# Patient Record
Sex: FEMALE | Race: WHITE | NOT HISPANIC OR LATINO | Employment: UNEMPLOYED | URBAN - METROPOLITAN AREA
[De-identification: names, ages, dates, MRNs, and addresses within clinical notes are randomized per-mention and may not be internally consistent; named-entity substitution may affect disease eponyms.]

---

## 2017-03-02 ENCOUNTER — TELEPHONE (OUTPATIENT)
Dept: OBGYN | Facility: CLINIC | Age: 27
End: 2017-03-02

## 2017-05-19 ENCOUNTER — TELEPHONE (OUTPATIENT)
Dept: OBGYN | Facility: CLINIC | Age: 27
End: 2017-05-19

## 2017-05-19 DIAGNOSIS — M54.9 BACK PAIN: Primary | ICD-10-CM
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
## 2017-07-28 ENCOUNTER — TELEPHONE (OUTPATIENT)
Dept: OBGYN | Facility: CLINIC | Age: 27
End: 2017-07-28

## 2017-07-28 DIAGNOSIS — R30.0 DYSURIA: Primary | ICD-10-CM

## 2017-07-28 RX ORDER — NITROFURANTOIN 25; 75 MG/1; MG/1
100 CAPSULE ORAL 2 TIMES DAILY
Qty: 14 CAPSULE | Refills: 0 | Status: SHIPPED | OUTPATIENT
Start: 2017-07-28 | End: 2017-09-20

## 2017-07-28 NOTE — TELEPHONE ENCOUNTER
Spoke with Cristine who is on vacation in [place] and experiencing symptoms of UTI. She states that she has burning with urination and has noticed blood in her urine. Denies any fever or back pain. Has not noticed any odor in urine. Has had UTI in past with similar symptoms but it went away on it's own. Patient has allergies to sulfa meds.    Nurse sent macrobid to vacation destination per protocol. Patient will report to urgent care over the weekend if no improvement.

## 2017-08-13 ENCOUNTER — TRANSFERRED RECORDS (OUTPATIENT)
Dept: HEALTH INFORMATION MANAGEMENT | Facility: CLINIC | Age: 27
End: 2017-08-13

## 2017-08-14 ENCOUNTER — TRANSFERRED RECORDS (OUTPATIENT)
Dept: HEALTH INFORMATION MANAGEMENT | Facility: CLINIC | Age: 27
End: 2017-08-14

## 2017-08-15 ENCOUNTER — TRANSFERRED RECORDS (OUTPATIENT)
Dept: HEALTH INFORMATION MANAGEMENT | Facility: CLINIC | Age: 27
End: 2017-08-15

## 2017-09-11 ENCOUNTER — TRANSFERRED RECORDS (OUTPATIENT)
Dept: HEALTH INFORMATION MANAGEMENT | Facility: CLINIC | Age: 27
End: 2017-09-11

## 2017-09-15 ENCOUNTER — TELEPHONE (OUTPATIENT)
Dept: OBGYN | Facility: CLINIC | Age: 27
End: 2017-09-15

## 2017-09-15 DIAGNOSIS — Z11.1 SCREENING EXAMINATION FOR PULMONARY TUBERCULOSIS: Primary | ICD-10-CM

## 2017-09-15 NOTE — TELEPHONE ENCOUNTER
Patient requesting tb test. Orders entered. Also having symptoms of small intestine disease? Wanting refill of Rifaximin, will ask Dr. Desai about this next week.     Order input.

## 2017-09-18 ENCOUNTER — ALLIED HEALTH/NURSE VISIT (OUTPATIENT)
Dept: OBGYN | Facility: CLINIC | Age: 27
End: 2017-09-18
Attending: FAMILY MEDICINE
Payer: COMMERCIAL

## 2017-09-18 DIAGNOSIS — Z11.1 SCREENING EXAMINATION FOR PULMONARY TUBERCULOSIS: Primary | ICD-10-CM

## 2017-09-18 PROCEDURE — 96372 THER/PROPH/DIAG INJ SC/IM: CPT | Mod: ZF

## 2017-09-18 PROCEDURE — 40000269 ZZH STATISTIC NO CHARGE FACILITY FEE: Mod: ZF

## 2017-09-18 NOTE — NURSING NOTE
Chief Complaint   Patient presents with     Allied Health Visit     Mantoux placement.       See JOHN Gong 9/18/2017

## 2017-09-18 NOTE — MR AVS SNAPSHOT
After Visit Summary   9/18/2017    Cristine Caballero    MRN: [id number]           Patient Information     Date Of Birth          1990        Visit Information        Provider Department      9/18/2017 3:00 PM Nurse, [facility]        Today's Diagnoses     Screening examination for pulmonary tuberculosis    -  1       Follow-ups after your visit        Your next 10 appointments already scheduled     Sep 18, 2017  3:00 PM CDT   Nurse Visit with [facility] Nurse   [facility] ([facility])    [facility] [address]  [address]  [facility] [id number] [phone]            Sep 20, 2017  4:00 PM CDT   Nurse Visit with [facility] Nurse   [facility] ([facility])    [facility] [address]  [address]  [facility] [address] [phone]              Who to contact     Please call your clinic at [phone] to:    Ask questions about your health    Make or cancel appointments    Discuss your medicines    Learn about your test results    Speak to your doctor   If you have compliments or concerns about an experience at your clinic, or if you wish to file a complaint, please contact [facility] Patient Relations at [phone] or email us at [email or web address]         Additional Information About Your Visit        MyChart Information     [employer] gives you secure access to your electronic health record. If you see a primary care provider, you can also send messages to your care team and make appointments. If you have questions, please call your primary care clinic.  If you do not have a primary care provider, please call [phone] and they will assist you.      [employer] is an electronic gateway that provides easy, online access to your medical records. With [employer], you can request a clinic appointment, read your  test results, renew a prescription or communicate with your care team.     To access your existing account, please contact your [facility] or call [phone] for assistance.        Care EveryWhere ID     This is your Care EveryWhere ID. This could be used by other organizations to access your [place] medical records  [id number]         Blood Pressure from Last 3 Encounters:   09/01/16 99/71   02/12/16 125/86   08/18/15 123/82    Weight from Last 3 Encounters:   09/01/16 89.8 kg (198 lb)   02/02/16 81.8 kg (180 lb 4.8 oz)   08/18/15 81.2 kg (179 lb 0.6 oz)              Today, you had the following     No orders found for display         Today's Medication Changes          These changes are accurate as of: 9/18/17  2:51 PM.  If you have any questions, ask your nurse or doctor.               These medicines have changed or have updated prescriptions.        Dose/Directions    vitamin D 2000 UNITS tablet   This may have changed:    - how much to take  - additional instructions   Used for:  Unspecified vitamin D deficiency        Dose:  1 tablet   Take 1 tablet by mouth daily Take one tablet daily.   Quantity:  90 tablet   Refills:  3                Primary Care Provider Office Phone # Fax #    Natalie Desai [phone]       [address] [place] [address]        Equal Access to Services     TYREL VAUGHN : Hadii bryce Guadarrama, waaxda luqadaha, qaybta kaalmada jamel, hetal sparrow . So [facility] [phone].    ATENCIÓN: Si habla español, tiene a carranza disposición servicios gratuitos de asistencia lingüística. Llame al [phone].    We comply with applicable federal civil rights laws and [place] laws. We do not discriminate on the basis of race, color, national origin, age, disability sex, sexual orientation or gender identity.            Thank you!     Thank you for choosing [facility]  for  your care. Our goal is always to provide you with excellent care. Hearing back from our patients is one way we can continue to improve our services. Please take a few minutes to complete the written survey that you may receive in the mail after your visit with us. Thank you!             Your Updated Medication List - Protect others around you: Learn how to safely use, store and throw away your medicines at www.disposemymeds.org.          This list is accurate as of: 9/18/17  2:51 PM.  Always use your most recent med list.                   Brand Name Dispense Instructions for use Diagnosis    ABILIFY 5 MG tablet   Generic drug:  ARIPiprazole      Take 5 mg by mouth daily        EFFEXOR PO      Take 150 mg by mouth daily        IBUPROFEN PO      Take 600 mg by mouth every 6 hours as needed for moderate pain        * INDERAL PO      Take 40 mg by mouth 3 times daily        * INDERAL LA PO      Take 60 mg by mouth daily        MELATONIN PO      Take 3 mg by mouth 2 times daily        nitroFURantoin (macrocrystal-monohydrate) 100 MG capsule    MACROBID    14 capsule    Take 1 capsule (100 mg) by mouth 2 times daily    Dysuria       OMEGA-3 FISH OIL PO      Take 2 g by mouth 2 times daily        TRAZODONE HCL PO      Take 25 mg by mouth daily        vitamin B complex with vitamin C Tabs tablet      Take 1 tablet by mouth daily        vitamin D 2000 UNITS tablet     90 tablet    Take 1 tablet by mouth daily Take one tablet daily.    Unspecified vitamin D deficiency       * Notice:  This list has 2 medication(s) that are the same as other medications prescribed for you. Read the directions carefully, and ask your doctor or other care provider to review them with you.

## 2017-09-20 ENCOUNTER — TELEPHONE (OUTPATIENT)
Dept: OBGYN | Facility: CLINIC | Age: 27
End: 2017-09-20

## 2017-09-20 ENCOUNTER — ALLIED HEALTH/NURSE VISIT (OUTPATIENT)
Dept: OBGYN | Facility: CLINIC | Age: 27
End: 2017-09-20
Attending: FAMILY MEDICINE
Payer: COMMERCIAL

## 2017-09-20 ENCOUNTER — OFFICE VISIT (OUTPATIENT)
Dept: URGENT CARE | Facility: URGENT CARE | Age: 27
End: 2017-09-20
Payer: COMMERCIAL

## 2017-09-20 VITALS
OXYGEN SATURATION: 98 % | SYSTOLIC BLOOD PRESSURE: 114 MMHG | HEIGHT: 65 IN | DIASTOLIC BLOOD PRESSURE: 74 MMHG | HEART RATE: 65 BPM | BODY MASS INDEX: 34.16 KG/M2 | TEMPERATURE: 98.5 F | WEIGHT: 205 LBS

## 2017-09-20 DIAGNOSIS — Z11.1 SCREENING EXAMINATION FOR PULMONARY TUBERCULOSIS: Primary | ICD-10-CM

## 2017-09-20 DIAGNOSIS — H65.92 LEFT OTITIS MEDIA WITH EFFUSION: Primary | ICD-10-CM

## 2017-09-20 DIAGNOSIS — K63.89 INTESTINAL BACTERIAL OVERGROWTH: ICD-10-CM

## 2017-09-20 LAB
PPDINDURATION: 0 MM (ref 0–5)
PPDREDNESS: 0 MM

## 2017-09-20 PROCEDURE — 40000809 ZZH STATISTIC NO DOCUMENTATION TO SUPPORT CHARGE

## 2017-09-20 PROCEDURE — 99212 OFFICE O/P EST SF 10 MIN: CPT | Mod: ZF

## 2017-09-20 PROCEDURE — 99213 OFFICE O/P EST LOW 20 MIN: CPT | Performed by: FAMILY MEDICINE

## 2017-09-20 RX ORDER — AZITHROMYCIN 250 MG/1
TABLET, FILM COATED ORAL
Qty: 6 TABLET | Refills: 0 | Status: SHIPPED | OUTPATIENT
Start: 2017-09-20 | End: 2017-11-20

## 2017-09-20 NOTE — LETTER
[facility]  [facility] [address]  [address]  [address]  [facility] [address]  [phone]  [phone]              Cristine Caballero  [address]  [facility] [address]  : 1990  MRN:  [id number]      2017          To Whom It May Concern:    Cristine presented to clinic on 17 at 2:34 p.m. and received the mantoux skin test.    The test was read on 17 at 3:26 p.m. and was negative for redness and had 0 induration.    Sincerely,        Marleny Harp RN

## 2017-09-20 NOTE — PROGRESS NOTES
Subjective: About 5 days ago patient noticed that the left ear was plugged and she even saw a little bit of blood come out of it, during his off, doesn't have a cold. She hasn't had trouble with her ears before. She also noticed a boil below the right ear but she's been having a lot of acne problems lately.    Objective: There is yellow pus behind the left eardrum but it's not particularly distorted or red. There is a dime-sized boil behind the right ear.    Assessment and plan: She certainly has an effusion in the left ear consistent with an ear infection but it's not a typical story. I think it's worth treating with antibiotics and that should help cyst on the right get better. I don't think it needs to be opened up and drained at this small size. She also had a complication of a bowel bacterial overgrowth and is going to be starting antibiotics to treat that sometime in the near future. I don't think that will have any effect on taking a course of Zithromax. The ear could take a month to get back to normal but if it doesn't she should go back and see her regular doctor.

## 2017-09-20 NOTE — NURSING NOTE
"Chief Complaint   Patient presents with     Urgent Care     Ear Problem     Left ear started to feel plugged 4 - 5 days ago; some discomfort and did have a bit of bloody drainage from ear.     Mass     4 - 5 days ago started to notice tender mass posterior to right ear, it's the same size as it was when she first noticed it.     Initial /74  Pulse 65  Temp 98.5  F (36.9  C) (Oral)  Ht 5' 5\" (1.651 m)  Wt 205 lb (93 kg)  LMP 09/03/2017 (Approximate)  SpO2 98%  BMI 34.11 kg/m2 Estimated body mass index is 34.11 kg/(m^2) as calculated from the following:    Height as of this encounter: 5' 5\" (1.651 m).    Weight as of this encounter: 205 lb (93 kg)..  BP completed using cuff size: large  SARA Purdy  "

## 2017-09-20 NOTE — MR AVS SNAPSHOT
"              After Visit Summary   9/20/2017    Cristine Caballero    MRN: [id number]           Patient Information     Date Of Birth          1990        Visit Information        Provider Department      9/20/2017 5:45 PM Jacob Millan MD [facility]        Today's Diagnoses     Left otitis media with effusion    -  1       Follow-ups after your visit        Who to contact     If you have questions or need follow up information about today's clinic visit or your schedule please contact [facility] directly at [phone].  Normal or non-critical lab and imaging results will be communicated to you by [employer], letter or phone within 4 business days after the clinic has received the results. If you do not hear from us within 7 days, please contact the clinic through [employer] or phone. If you have a critical or abnormal lab result, we will notify you by phone as soon as possible.  Submit refill requests through [employer] or call your pharmacy and they will forward the refill request to us. Please allow 3 business days for your refill to be completed.          Additional Information About Your Visit        MyChart Information     [employer] gives you secure access to your electronic health record. If you see a primary care provider, you can also send messages to your care team and make appointments. If you have questions, please call your primary care clinic.  If you do not have a primary care provider, please call [phone] and they will assist you.        Care EveryWhere ID     This is your Care EveryWhere ID. This could be used by other organizations to access your [place] medical records  [id number]        Your Vitals Were     Pulse Temperature Height Last Period Pulse Oximetry BMI (Body Mass Index)    65 98.5  F (36.9  C) (Oral) 5' 5\" (1.651 m) 09/03/2017 (Approximate) 98% 34.11 kg/m2       Blood Pressure from Last 3 Encounters:   09/20/17 114/74   09/01/16 " 99/71   02/12/16 125/86    Weight from Last 3 Encounters:   09/20/17 205 lb (93 kg)   09/01/16 198 lb (89.8 kg)   02/02/16 180 lb 4.8 oz (81.8 kg)              Today, you had the following     No orders found for display         Today's Medication Changes          These changes are accurate as of: 9/20/17  6:12 PM.  If you have any questions, ask your nurse or doctor.               Start taking these medicines.        Dose/Directions    azithromycin 250 MG tablet   Commonly known as:  ZITHROMAX   Used for:  Left otitis media with effusion   Started by:  Jacob Millan MD        Two tablets first day, then one tablet daily for four days.   Quantity:  6 tablet   Refills:  0       rifaximin 550 MG Tabs tablet   Commonly known as:  XIFAXAN   Used for:  Intestinal bacterial overgrowth   Started by:  Nurse, Ump Whs        Dose:  550 mg   Take 1 tablet (550 mg) by mouth 2 times daily   Quantity:  20 tablet   Refills:  2         These medicines have changed or have updated prescriptions.        Dose/Directions    vitamin D 2000 UNITS tablet   This may have changed:    - how much to take  - additional instructions   Used for:  Unspecified vitamin D deficiency        Dose:  1 tablet   Take 1 tablet by mouth daily Take one tablet daily.   Quantity:  90 tablet   Refills:  3            Where to get your medicines      These medications were sent to [facility] [address] [place] [address] AT [address] [address]     Phone:  [phone]     azithromycin 250 MG tablet    rifaximin 550 MG Tabs tablet                Primary Care Provider Office Phone # Fax #    Natalie Desai -[phone]       [address] [address]        Equal Access to Services     TYREL [facility] HospitalBETTIE : Madina Guadarrama, celestino rodriguez, hetal ogden. So [facility] [phone].    ATENCIÓN: Jessie hernandez  español, tiene a carranza disposición servicios gratuitos de asistencia lingüística. Vivian willson [phone].    We comply with applicable federal civil rights laws and [place] laws. We do not discriminate on the basis of race, color, national origin, age, disability sex, sexual orientation or gender identity.            Thank you!     Thank you for choosing [facility]  for your care. Our goal is always to provide you with excellent care. Hearing back from our patients is one way we can continue to improve our services. Please take a few minutes to complete the written survey that you may receive in the mail after your visit with us. Thank you!             Your Updated Medication List - Protect others around you: Learn how to safely use, store and throw away your medicines at www.disposemymeds.org.          This list is accurate as of: 9/20/17  6:12 PM.  Always use your most recent med list.                   Brand Name Dispense Instructions for use Diagnosis    ABILIFY 5 MG tablet   Generic drug:  ARIPiprazole      Take 5 mg by mouth daily        azithromycin 250 MG tablet    ZITHROMAX    6 tablet    Two tablets first day, then one tablet daily for four days.    Left otitis media with effusion       BUSPIRONE HCL PO      Take 10 mg by mouth 3 times daily        EFFEXOR PO      Take 150 mg by mouth daily        IBUPROFEN PO      Take 600 mg by mouth every 6 hours as needed for moderate pain        * INDERAL PO      Take 40 mg by mouth 3 times daily        * INDERAL LA PO      Take 60 mg by mouth daily        MELATONIN PO      Take 3 mg by mouth 2 times daily        OMEGA-3 FISH OIL PO      Take 2 g by mouth 2 times daily        rifaximin 550 MG Tabs tablet    XIFAXAN    20 tablet    Take 1 tablet (550 mg) by mouth 2 times daily    Intestinal bacterial overgrowth       TRAZODONE HCL PO      Take 25 mg by mouth daily        vitamin B complex with vitamin C Tabs tablet      Take 1 tablet by mouth  daily        vitamin D 2000 UNITS tablet     90 tablet    Take 1 tablet by mouth daily Take one tablet daily.    Unspecified vitamin D deficiency       * Notice:  This list has 2 medication(s) that are the same as other medications prescribed for you. Read the directions carefully, and ask your doctor or other care provider to review them with you.

## 2017-10-03 ENCOUNTER — MEDICAL CORRESPONDENCE (OUTPATIENT)
Dept: HEALTH INFORMATION MANAGEMENT | Facility: CLINIC | Age: 27
End: 2017-10-03

## 2017-10-03 ENCOUNTER — TELEPHONE (OUTPATIENT)
Dept: FAMILY MEDICINE | Facility: CLINIC | Age: 27
End: 2017-10-03

## 2017-10-03 PROBLEM — K63.8219 SMALL INTESTINAL BACTERIAL OVERGROWTH: Status: ACTIVE | Noted: 2017-10-03

## 2017-10-03 NOTE — TELEPHONE ENCOUNTER
Prior Authorization Retail Medication Request  Medication/Dose:  Xifaxan 550 MG by mouth 2 times daily  Diagnosis and ICD code: Intestinal bacterial overgrowth K63.89  New/Renewal/Insurance Change [place]: renewal  Previously Tried and Failed Therapies:      Insurance ID (if provided):    Insurance Phone (if provided):      Any additional info from fax request:     If you received a fax notification from an outside Pharmacy:  Pharmacy Name:[facility]  Pharmacy #:[phone]  Pharmacy Fax:[phone]

## 2017-11-08 ENCOUNTER — TRANSFERRED RECORDS (OUTPATIENT)
Dept: HEALTH INFORMATION MANAGEMENT | Facility: CLINIC | Age: 27
End: 2017-11-08

## 2017-11-10 ENCOUNTER — TRANSFERRED RECORDS (OUTPATIENT)
Dept: HEALTH INFORMATION MANAGEMENT | Facility: CLINIC | Age: 27
End: 2017-11-10

## 2017-11-20 ENCOUNTER — OFFICE VISIT (OUTPATIENT)
Dept: FAMILY MEDICINE | Facility: CLINIC | Age: 27
End: 2017-11-20
Attending: FAMILY MEDICINE
Payer: COMMERCIAL

## 2017-11-20 VITALS
TEMPERATURE: 98.1 F | BODY MASS INDEX: 36.99 KG/M2 | WEIGHT: 222 LBS | HEIGHT: 65 IN | DIASTOLIC BLOOD PRESSURE: 77 MMHG | SYSTOLIC BLOOD PRESSURE: 113 MMHG | HEART RATE: 69 BPM

## 2017-11-20 DIAGNOSIS — H90.2 CONDUCTIVE HEARING LOSS, UNILATERAL: Primary | ICD-10-CM

## 2017-11-20 PROCEDURE — 99212 OFFICE O/P EST SF 10 MIN: CPT | Mod: ZF

## 2017-11-20 RX ORDER — LURASIDONE HYDROCHLORIDE 20 MG/1
TABLET, FILM COATED ORAL DAILY
COMMUNITY
End: 2018-04-17

## 2017-11-20 RX ORDER — PROPRANOLOL HYDROCHLORIDE 10 MG/1
TABLET ORAL 3 TIMES DAILY
COMMUNITY
End: 2018-04-17

## 2017-11-20 RX ORDER — ARIPIPRAZOLE 15 MG/1
15 TABLET ORAL DAILY
COMMUNITY
End: 2018-04-17

## 2017-11-20 ASSESSMENT — PAIN SCALES - GENERAL: PAINLEVEL: NO PAIN (0)

## 2017-11-20 NOTE — PROGRESS NOTES
Primary Care Center  Established Patient visit    Name: Cristine Caballero MRN: [id number]     Age: 27 year old           Chief Complaint:    YOB: 1990       CC:hearing loss         HPI and ROS:   HPI:    27 year old female with a PMH of MDD, OCD, DENAE  And IBS who comes in because of sudden hearing loss on the left side.  This started about 2 wks ago  - she went to urgent care and was given nasal steroid spray  And treated for serous otitis.  It has not cleared. She can't pop the left ear, she feels like she is hearing in a tunnel, no dizziness, she has been using nasal steroid spray about 1 wk. She denies dizziness, has some pain but not intense pain, no face burning or pain, no jaw burning or pain, slight sore throat, nasal congestion - a cold is just starting.        ROS:  She reports weight gain, increase stress, nasal congestion, cough, hearing loss, stuffiness , SOB, anxiety, depression and difficulty concentrating  Rest of ROS is negative other than mentioned in HPI         Past Medical History:     Past Medical History:   Diagnosis Date     Anxiety disorder      Environmental allergies      Hx of eating disorder      Mild major depression (H)     ANW     OCD (obsessive compulsive disorder)     stable with CBT      Suicide attempt by multiple drug overdose (H) 4.21.2016     Vitamin D deficiency              Past Surgical History:      Past Surgical History:   Procedure Laterality Date     biopsy  8-    vulvar     BIOPSY      GI, vulva- OK     COLONOSCOPY  2011     DENTAL SURGERY  2011     ENT SURGERY  2014    intubation & NG tube     ESOPHAGOSCOPY, GASTROSCOPY, DUODENOSCOPY (EGD), COMBINED  10/11/2011    Procedure:COMBINED ESOPHAGOSCOPY, GASTROSCOPY, DUODENOSCOPY (EGD), BIOPSY SINGLE OR MULTIPLE; Surgeon:TALA MONTEMAYOR; Location:[facility]     HC BREATH HYDROGEN TEST  10/4/2011    Procedure:HYDROGEN BREATH TEST; Surgeon:RADHA EPSTEIN; Location:[facility]     LAPAROSCOPY        "ORTHOPEDIC SURGERY      left wrist- screw in left scaphiod     TONSILLECTOMY       WRIST SURGERY  2004    screw placed L wrist after fracture               Immunizations:     Immunization History   Administered Date(s) Administered     HEPA 04/27/2012     Influenza (IIV3) 10/03/2011     Mantoux 08/18/2015, 09/18/2017     OPV, trivalent, live 05/22/2012     Tdap (Adacel,Boostrix) 04/27/2012     Typhoid IM 04/27/2012             Allergies:     Allergies   Allergen Reactions     Codeine Other (See Comments)     Other reaction(s): Tremors     Corn Dextrin      Gluten Meal GI Disturbance     Penicillin G      Sulfa Drugs              Medications:     Current Outpatient Prescriptions on File Prior to Visit:  Venlafaxine HCl (EFFEXOR PO) Take 150 mg by mouth daily Take 2 tablets   Propranolol HCl (INDERAL LA PO) Take 60 mg by mouth daily   Omega-3 Fatty Acids (OMEGA-3 FISH OIL PO) Take 2 g by mouth 2 times daily    Cholecalciferol (VITAMIN D) 2000 UNITS tablet Take 1 tablet by mouth daily Take one tablet daily. (Patient taking differently: Take 2,000 Units by mouth daily )     No current facility-administered medications on file prior to visit.          Exam:   Ht 1.651 m (5' 5\")  Wt 100.7 kg (222 lb)  BMI 36.94 kg/m2  Female   General: NAD, alert and conversational  HEENT: PEERL, right ear diminished light reflex, left ear there is a pustule on the superior aspect of the TM, mild surrounding redness, nasal congestion bilaterally, neck supple no discrete nodes, OP mucus membranes moist, no lesions  CV: Normal S1,S2 with RRR no murmurs, rubs or gallops  Resp: Lungs CTA bilaterally with no wheezes or crackles  Neuro: cranial nerves intact   Skin: moderate inflammatory acne on face        Labs:   none         Assessment and Plan:   Assessment: Young woman with sudden hearing loss left ear thought to be a serous otitis, not clearing and has abnormal TM on the left  Plan  1. Hearing loss - with the pustule on the TM would " worry about Lopez Hunt syndrome but does not have intense pain, and no facial features - other possbility is this is early in the syndrome or possibly just a serous otitis media  I talked to ENT triage nurse and they can't get her in this wk or next wk. Told pt if pain gets more intense then go to ED otherwise she will call and schedule within the next 2 wks - continue with nasal spray.     RTC: 1 month   Niyah Liao MD, PhD  I spent  25 minutes with this patient.  > 50% of time spent in counseling and coordination of care

## 2017-11-20 NOTE — LETTER
11/20/2017       RE: Cristine Caballero  [address]  [facility] [address]     Dear Colleague,    Thank you for referring your patient, Cristine Caballero, to the [facility] at [facility]. Please see a copy of my visit note below.              Primary Care Center  Established Patient visit    Name: Cristine Caballero MRN: [id number]     Age: 27 year old           Chief Complaint:    YOB: 1990       CC:hearing loss         HPI and ROS:   HPI:    27 year old female with a PMH of MDD, OCD, DENAE  And IBS who comes in because of sudden hearing loss on the left side.  This started about 2 wks ago  - she went to urgent care and was given nasal steroid spray  And treated for serous otitis.  It has not cleared. She can't pop the left ear, she feels like she is hearing in a tunnel, no dizziness, she has been using nasal steroid spray about 1 wk. She denies dizziness, has some pain but not intense pain, no face burning or pain, no jaw burning or pain, slight sore throat, nasal congestion - a cold is just starting.        ROS:  She reports weight gain, increase stress, nasal congestion, cough, hearing loss, stuffiness , SOB, anxiety, depression and difficulty concentrating  Rest of ROS is negative other than mentioned in HPI         Past Medical History:     Past Medical History:   Diagnosis Date     Anxiety disorder      Environmental allergies      Hx of eating disorder      Mild major depression (H)     ANW     OCD (obsessive compulsive disorder)     stable with CBT      Suicide attempt by multiple drug overdose (H) 4.21.2016     Vitamin D deficiency              Past Surgical History:      Past Surgical History:   Procedure Laterality Date     biopsy  8-    vulvar     BIOPSY      GI, vulva- OK     COLONOSCOPY  2011     DENTAL SURGERY  2011     ENT SURGERY  2014    intubation & NG tube     ESOPHAGOSCOPY, GASTROSCOPY, DUODENOSCOPY (EGD),  "COMBINED  10/11/2011    Procedure:COMBINED ESOPHAGOSCOPY, GASTROSCOPY, DUODENOSCOPY (EGD), BIOPSY SINGLE OR MULTIPLE; Surgeon:TALA MONTEMAYOR; Location:[facility]     HC BREATH HYDROGEN TEST  10/4/2011    Procedure:HYDROGEN BREATH TEST; Surgeon:RADHA EPSTEIN; Location:[facility]     LAPAROSCOPY       ORTHOPEDIC SURGERY      left wrist- screw in left scaphiod     TONSILLECTOMY       WRIST SURGERY  2004    screw placed L wrist after fracture               Immunizations:     Immunization History   Administered Date(s) Administered     HEPA 04/27/2012     Influenza (IIV3) 10/03/2011     Mantoux 08/18/2015, 09/18/2017     OPV, trivalent, live 05/22/2012     Tdap (Adacel,Boostrix) 04/27/2012     Typhoid IM 04/27/2012             Allergies:     Allergies   Allergen Reactions     Codeine Other (See Comments)     Other reaction(s): Tremors     Corn Dextrin      Gluten Meal GI Disturbance     Penicillin G      Sulfa Drugs              Medications:     Current Outpatient Prescriptions on File Prior to Visit:  Venlafaxine HCl (EFFEXOR PO) Take 150 mg by mouth daily Take 2 tablets   Propranolol HCl (INDERAL LA PO) Take 60 mg by mouth daily   Omega-3 Fatty Acids (OMEGA-3 FISH OIL PO) Take 2 g by mouth 2 times daily    Cholecalciferol (VITAMIN D) 2000 UNITS tablet Take 1 tablet by mouth daily Take one tablet daily. (Patient taking differently: Take 2,000 Units by mouth daily )     No current facility-administered medications on file prior to visit.          Exam:   Ht 1.651 m (5' 5\")  Wt 100.7 kg (222 lb)  BMI 36.94 kg/m2  Female   General: NAD, alert and conversational  HEENT: PEERL, right ear diminished light reflex, left ear there is a pustule on the superior aspect of the TM, mild surrounding redness, nasal congestion bilaterally, neck supple no discrete nodes, OP mucus membranes moist, no lesions  CV: Normal S1,S2 with RRR no murmurs, rubs or gallops  Resp: Lungs CTA bilaterally with no wheezes or crackles  Neuro: cranial nerves " intact   Skin: moderate inflammatory acne on face        Labs:   none         Assessment and Plan:   Assessment: Young woman with sudden hearing loss left ear thought to be a serous otitis, not clearing and has abnormal TM on the left  Plan  1. Hearing loss - with the pustule on the TM would worry about Lopez Hunt syndrome but does not have intense pain, and no facial features - other possbility is this is early in the syndrome or possibly just a serous otitis media  I talked to ENT triage nurse and they can't get her in this wk or next wk. Told pt if pain gets more intense then go to ED otherwise she will call and schedule within the next 2 wks - continue with nasal spray.     RTC: 1 month   Niyah Liao MD, PhD  I spent  25 minutes with this patient.  > 50% of time spent in counseling and coordination of care                 Again, thank you for allowing me to participate in the care of your patient.      Sincerely,    Niyah Liao MD PhD

## 2017-11-20 NOTE — PATIENT INSTRUCTIONS
Call  option #1 and tell them I talked to Triage nurse and you should be seen as soon as you can  If pain gets intense or worsens go to the ED  No cutips in the ear  Continue the nasal spray  Keep the appt at [facility] on Dec 11th  Try to pop ears gently

## 2017-11-20 NOTE — MR AVS SNAPSHOT
After Visit Summary   11/20/2017    Cristine Caballero    MRN: [id number]           Patient Information     Date Of Birth          1990        Visit Information        Provider Department      11/20/2017 4:00 PM Niyah Liao MD PhD [facility]        Today's Diagnoses     Conductive hearing loss, unilateral    -  1      Care Instructions    Call  option #1 and tell them I talked to Triage nurse and you should be seen as soon as you can  If pain gets intense or worsens go to the ED  No cutips in the ear  Continue the nasal spray  [facility] the app[place] [facility] [place]  Try to pop ears gently           Follow-ups after your visit        Additional Services     OTOLARYNGOLOGY REFERRAL       Your provider has referred you to: [facility]: Adult Ear, Nose [facility] Throat Clinic (Otolaryngology) [facility] [phone]  [email or web address]      Please be aware that coverage of these services is subject to the terms and limitations of your health insurance plan.  Call member services at your health plan with any benefit or coverage questions.      Please bring the following with you to your appointment:    (1) Any X-Rays, CTs or MRIs which have been performed.  Contact the facility where they were done to arrange for  prior to your scheduled appointment.   (2) List of current medications  (3) This referral request   (4) Any documents/labs given to you for this referral                  Future tests that were ordered for you today     Open Future Orders        Priority Expected Expires Ordered    OTOLARYNGOLOGY REFERRAL Routine 11/21/2017 1/20/2018 11/20/2017            Who to contact     Please call your clinic at [phone] to:    Ask questions about your health    Make or cancel appointments    Discuss your medicines    Learn about your test results    Speak to your doctor   If you have compliments or concerns about an  "experience at your clinic, or if you wish to file a complaint, please contact [facility] Patient Relations at [phone] or email us at [email or web address]         Additional Information About Your Visit        BLUEPHOENIX[employer] Information     [employer] gives you secure access to your electronic health record. If you see a primary care provider, you can also send messages to your care team and make appointments. If you have questions, please call your primary care clinic.  If you do not have a primary care provider, please call [phone] and they will assist you.      [employer] is an electronic gateway that provides easy, online access to your medical records. With [employer], you can request a clinic appointment, read your test results, renew a prescription or communicate with your care team.     To access your existing account, please contact your [facility] or call [phone] for assistance.        Care EveryWhere ID     This is your Care EveryWhere ID. This could be used by other organizations to access your [place] medical records  [id number]        Your Vitals Were     Pulse Temperature Height BMI (Body Mass Index)          69 98.1  F (36.7  C) 1.651 m (5' 5\") 36.94 kg/m2         Blood Pressure from Last 3 Encounters:   11/20/17 113/77   09/20/17 114/74   09/01/16 99/71    Weight from Last 3 Encounters:   11/20/17 100.7 kg (222 lb)   09/20/17 93 kg (205 lb)   09/01/16 89.8 kg (198 lb)                 Today's Medication Changes          These changes are accurate as of: 11/20/17  4:44 PM.  If you have any questions, ask your nurse or doctor.               These medicines have changed or have updated prescriptions.        Dose/Directions    vitamin D 2000 UNITS tablet   This may have changed:    - how much to take  - additional instructions   Used for:  Unspecified vitamin D deficiency        Dose:  1 tablet   Take 1 tablet by mouth daily Take one " tablet daily.   Quantity:  90 tablet   Refills:  3                Primary Care Provider Office Phone # Fax #    Natalie Desai -[phone]       [address] [address]        Equal Access to Services     RAKESH VAUGHN : Hadii bryce cerda mahnaz Sofelizali, waaxda luqadaha, qaybta kaalmada ademichaelyada, hetal bekaitlin duttonmichael boyer laJesus Albertoyon marcos. So [facility] [phone].    ATENCIÓN: Si habla español, tiene a carranza disposición servicios gratuitos de asistencia lingüística. Llame al [phone].    We comply with applicable federal civil rights laws and [place] laws. We do not discriminate on the basis of race, color, national origin, age, disability, sex, sexual orientation, or gender identity.            Thank you!     Thank you for choosing [facility]  for your care. Our goal is always to provide you with excellent care. Hearing back from our patients is one way we can continue to improve our services. Please take a few minutes to complete the written survey that you may receive in the mail after your visit with us. Thank you!             Your Updated Medication List - Protect others around you: Learn how to safely use, store and throw away your medicines at www.disposemymeds.org.          This list is accurate as of: 11/20/17  4:44 PM.  Always use your most recent med list.                   Brand Name Dispense Instructions for use Diagnosis    ABILIFY 15 MG tablet   Generic drug:  ARIPiprazole      Take 15 mg by mouth daily        EFFEXOR PO      Take 150 mg by mouth daily Take 2 tablets        * INDERAL LA PO      Take 60 mg by mouth daily        * propranolol 10 MG tablet    INDERAL     Take by mouth 3 times daily        LATUDA 20 MG Tabs tablet   Generic drug:  lurasidone      Take by mouth daily        OMEGA-3 FISH OIL PO      Take 2 g by mouth 2 times daily        vitamin D 2000 UNITS tablet     90 tablet    Take 1 tablet by mouth daily Take one tablet daily.     Unspecified vitamin D deficiency       * Notice:  This list has 2 medication(s) that are the same as other medications prescribed for you. Read the directions carefully, and ask your doctor or other care provider to review them with you.

## 2017-11-24 ENCOUNTER — TRANSFERRED RECORDS (OUTPATIENT)
Dept: HEALTH INFORMATION MANAGEMENT | Facility: CLINIC | Age: 27
End: 2017-11-24

## 2017-12-22 ENCOUNTER — TELEPHONE (OUTPATIENT)
Dept: OBGYN | Facility: CLINIC | Age: 27
End: 2017-12-22

## 2017-12-22 NOTE — TELEPHONE ENCOUNTER
Received call from Cristine stating she is having nausea, chills and sweating as well as some spotting after recent dx of 'vaginitis' and treated at urgent care with both antibiotic and metrogel because they were not sure what she had. Confirmed pt is NOT pregnant.    Discussed the followin. The nausea, chills and sweating are likely a virus that has been going around. Hydrate and watch for signs of dehydration (discussed with pt what these are). If dehydration develops, go to ED.  2. The bleeding can be from the vaginitis  - can monitor at home. If it increases, develops an odor, or develop pain - be seen in clinic. If not comfortable remaining at home and monitoring - can come to clinic today. She states she would like to continue to monitor at home and had no further questions.

## 2018-03-09 ENCOUNTER — TELEPHONE (OUTPATIENT)
Dept: OBGYN | Facility: CLINIC | Age: 28
End: 2018-03-09

## 2018-03-09 DIAGNOSIS — Z11.1 ENCOUNTER FOR SCREENING FOR RESPIRATORY TUBERCULOSIS: Primary | ICD-10-CM

## 2018-03-09 NOTE — TELEPHONE ENCOUNTER
Patient called to request mantoux placement. Will be completing a nursing assistant course. Ordered mantoux and scheduled nurse appointments for patient.

## 2018-03-13 ENCOUNTER — ALLIED HEALTH/NURSE VISIT (OUTPATIENT)
Dept: OBGYN | Facility: CLINIC | Age: 28
End: 2018-03-13
Payer: COMMERCIAL

## 2018-03-13 DIAGNOSIS — Z11.1 ENCOUNTER FOR SCREENING FOR RESPIRATORY TUBERCULOSIS: Primary | ICD-10-CM

## 2018-03-13 PROCEDURE — 40000269 ZZH STATISTIC NO CHARGE FACILITY FEE: Mod: ZF

## 2018-03-13 PROCEDURE — 96372 THER/PROPH/DIAG INJ SC/IM: CPT | Mod: ZF

## 2018-03-13 NOTE — MR AVS SNAPSHOT
After Visit Summary   3/13/2018    Cristine Caballero    MRN: [id number]           Patient Information     Date Of Birth          1990        Visit Information        Provider Department      3/13/2018 9:30 AM Nurse, [facility]        Today's Diagnoses     Encounter for screening for respiratory tuberculosis    -  1       Follow-ups after your visit        Your next 10 appointments already scheduled     Mar 15, 2018 11:00 AM CDT   Nurse Visit with [facility] Nurse   [facility] ([facility])    [facility]  [address]  [address]  [facility] [id number] [phone]              Who to contact     Please call your clinic at [phone] to:    Ask questions about your health    Make or cancel appointments    Discuss your medicines    Learn about your test results    Speak to your doctor            Additional Information About Your Visit        MyChart Information     [employer] gives you secure access to your electronic health record. If you see a primary care provider, you can also send messages to your care team and make appointments. If you have questions, please call your primary care clinic.  If you do not have a primary care provider, please call [phone] and they will assist you.      [employer] is an electronic gateway that provides easy, online access to your medical records. With [employer], you can request a clinic appointment, read your test results, renew a prescription or communicate with your care team.     To access your existing account, please contact your [facility] or call [phone] for assistance.        Care EveryWhere ID     This is your Care EveryWhere ID. This could be used by other organizations to access your [place] medical records  [id number]         Blood Pressure from Last 3 Encounters:   11/20/17 113/77   09/20/17 114/74   09/01/16 99/71    Weight  from Last 3 Encounters:   11/20/17 100.7 kg (222 lb)   09/20/17 93 kg (205 lb)   09/01/16 89.8 kg (198 lb)              Today, you had the following     No orders found for display         Today's Medication Changes          These changes are accurate as of 3/13/18 11:29 AM.  If you have any questions, ask your nurse or doctor.               Start taking these medicines.        Dose/Directions    tuberculin 5 UNIT/0.1ML injection   Used for:  Encounter for screening for respiratory tuberculosis        Dose:  5 Units   Inject 0.1 mLs (5 Units) into the skin once for 1 dose   Quantity:  0.1 mL   Refills:  0         These medicines have changed or have updated prescriptions.        Dose/Directions    vitamin D 2000 UNITS tablet   This may have changed:    - how much to take  - additional instructions   Used for:  Unspecified vitamin D deficiency        Dose:  1 tablet   Take 1 tablet by mouth daily Take one tablet daily.   Quantity:  90 tablet   Refills:  3            Where to get your medicines      Some of these will need a paper prescription and others can be bought over the counter.  Ask your nurse if you have questions.     You don't need a prescription for these medications     tuberculin 5 UNIT/0.1ML injection                Primary Care Provider Office Phone # Fax #    Natalie Desai [phone]       [address] [place] [address]        Equal Access to Services     RAKESH VAUGHN AH: Hadii bryce alcantaro Solew, waaxda luqadaha, qaybta kaalmada adeegyada, hetal marcos. So [facility] [phone].    ATENCIÓN: Si habla español, tiene a carranza disposición servicios gratuitos de asistencia lingüística. Llame al [phone].    We comply with applicable federal civil rights laws and [place] laws. We do not discriminate on the basis of race, color, national origin, age, disability, sex, sexual orientation, or gender identity.            Thank you!     Thank you  for choosing [facility]  for your care. Our goal is always to provide you with excellent care. Hearing back from our patients is one way we can continue to improve our services. Please take a few minutes to complete the written survey that you may receive in the mail after your visit with us. Thank you!             Your Updated Medication List - Protect others around you: Learn how to safely use, store and throw away your medicines at www.disposemymeds.org.          This list is accurate as of 3/13/18 11:29 AM.  Always use your most recent med list.                   Brand Name Dispense Instructions for use Diagnosis    ABILIFY 15 MG tablet   Generic drug:  ARIPiprazole      Take 15 mg by mouth daily        EFFEXOR PO      Take 150 mg by mouth daily Take 2 tablets        * INDERAL LA PO      Take 60 mg by mouth daily        * propranolol 10 MG tablet    INDERAL     Take by mouth 3 times daily        LATUDA 20 MG Tabs tablet   Generic drug:  lurasidone      Take by mouth daily        OMEGA-3 FISH OIL PO      Take 2 g by mouth 2 times daily        tuberculin 5 UNIT/0.1ML injection     0.1 mL    Inject 0.1 mLs (5 Units) into the skin once for 1 dose    Encounter for screening for respiratory tuberculosis       vitamin D 2000 UNITS tablet     90 tablet    Take 1 tablet by mouth daily Take one tablet daily.    Unspecified vitamin D deficiency       * Notice:  This list has 2 medication(s) that are the same as other medications prescribed for you. Read the directions carefully, and ask your doctor or other care provider to review them with you.

## 2018-03-13 NOTE — NURSING NOTE
Chief Complaint   Patient presents with     Allied Health Visit     Mantoux placement       See JOHN Gong 3/13/2018    Mantoux placement on right forearm at 10:16am. Pt will come back at 11am on 03/15.

## 2018-03-15 ENCOUNTER — ALLIED HEALTH/NURSE VISIT (OUTPATIENT)
Dept: OBGYN | Facility: CLINIC | Age: 28
End: 2018-03-15
Payer: COMMERCIAL

## 2018-03-15 DIAGNOSIS — Z11.1 SCREENING EXAMINATION FOR PULMONARY TUBERCULOSIS: Primary | ICD-10-CM

## 2018-03-15 LAB
PPDINDURATION: 0 MM (ref 0–5)
PPDREDNESS: 0 MM

## 2018-03-15 PROCEDURE — 86580 TB INTRADERMAL TEST: CPT | Mod: ZF

## 2018-03-15 NOTE — MR AVS SNAPSHOT
After Visit Summary   3/15/2018    Cristine Caballero    MRN: [id number]           Patient Information     Date Of Birth          1990        Visit Information        Provider Department      3/15/2018 11:00 AM [facility]        Today's Diagnoses     Screening examination for pulmonary tuberculosis    -  1       Follow-ups after your visit        Who to contact     Please call your clinic at [phone] to:    Ask questions about your health    Make or cancel appointments    Discuss your medicines    Learn about your test results    Speak to your doctor            Additional Information About Your Visit        MyChart Information     [employer] gives you secure access to your electronic health record. If you see a primary care provider, you can also send messages to your care team and make appointments. If you have questions, please call your primary care clinic.  If you do not have a primary care provider, please call [phone] and they will assist you.      [employer] is an electronic gateway that provides easy, online access to your medical records. With [employer], you can request a clinic appointment, read your test results, renew a prescription or communicate with your care team.     To access your existing account, please contact your [facility] or call [phone] for assistance.        Care EveryWhere ID     This is your Care EveryWhere ID. This could be used by other organizations to access your [place] medical records  [id number]         Blood Pressure from Last 3 Encounters:   11/20/17 113/77   09/20/17 114/74   09/01/16 99/71    Weight from Last 3 Encounters:   11/20/17 100.7 kg (222 lb)   09/20/17 93 kg (205 lb)   09/01/16 89.8 kg (198 lb)              We Performed the Following     TB INTRADERMAL TEST          Today's Medication Changes          These changes are accurate as of 3/15/18 11:16 AM.  If you have any  questions, ask your nurse or doctor.               These medicines have changed or have updated prescriptions.        Dose/Directions    vitamin D 2000 UNITS tablet   This may have changed:    - how much to take  - additional instructions   Used for:  Unspecified vitamin D deficiency        Dose:  1 tablet   Take 1 tablet by mouth daily Take one tablet daily.   Quantity:  90 tablet   Refills:  3                Primary Care Provider Office Phone # Fax #    Natalie Desai [phone]       [address] [facility] [address]  [place] [address]        Equal Access to Services     RAKESH VAUGHN : Hadii bryce ku hadasho Soomaali, waaxda luqadaha, qaybta kaalmada adeegyada, waxay carlos hayserenan demetrio sparrow . So [facility] [phone].    ATENCIÓN: Si habla español, tiene a carranza disposición servicios gratuitos de asistencia lingüística. [facility] [phone].    We comply with applicable federal civil rights laws and [place] laws. We do not discriminate on the basis of race, color, national origin, age, disability, sex, sexual orientation, or gender identity.            Thank you!     Thank you for choosing [facility]  for your care. Our goal is always to provide you with excellent care. Hearing back from our patients is one way we can continue to improve our services. Please take a few minutes to complete the written survey that you may receive in the mail after your visit with us. Thank you!             Your Updated Medication List - Protect others around you: Learn how to safely use, store and throw away your medicines at www.disposemymeds.org.          This list is accurate as of 3/15/18 11:16 AM.  Always use your most recent med list.                   Brand Name Dispense Instructions for use Diagnosis    ABILIFY 15 MG tablet   Generic drug:  ARIPiprazole      Take 15 mg by mouth daily        EFFEXOR PO      Take 150 mg by mouth daily Take 2 tablets        * INDERAL LA PO      Take 60 mg  by mouth daily        * propranolol 10 MG tablet    INDERAL     Take by mouth 3 times daily        LATUDA 20 MG Tabs tablet   Generic drug:  lurasidone      Take by mouth daily        OMEGA-3 FISH OIL PO      Take 2 g by mouth 2 times daily        vitamin D 2000 UNITS tablet     90 tablet    Take 1 tablet by mouth daily Take one tablet daily.    Unspecified vitamin D deficiency       * Notice:  This list has 2 medication(s) that are the same as other medications prescribed for you. Read the directions carefully, and ask your doctor or other care provider to review them with you.

## 2018-04-11 ENCOUNTER — TELEPHONE (OUTPATIENT)
Dept: OBGYN | Facility: CLINIC | Age: 28
End: 2018-04-11

## 2018-04-11 NOTE — TELEPHONE ENCOUNTER
Spoke with Cristine who is sure she has an infected hair follicle just outside of her arm pit. She states that at first it was small and non-noticeable but recently it has gotten bigger and is now draining fluid. She describes it as about 1/2 cm in length. Denies any fever or pus or surrounding skin redness. Nurse advised covering the follicle with some bacitracin and a bandage and change frequently. If no improvement by next week call back for appt. Also call immediately if fever, pus, or spreading skin redness. Patient agreeable to plan.

## 2018-04-17 ENCOUNTER — OFFICE VISIT (OUTPATIENT)
Dept: FAMILY MEDICINE | Facility: CLINIC | Age: 28
End: 2018-04-17
Attending: FAMILY MEDICINE
Payer: COMMERCIAL

## 2018-04-17 VITALS
HEIGHT: 65 IN | DIASTOLIC BLOOD PRESSURE: 77 MMHG | HEART RATE: 58 BPM | WEIGHT: 223 LBS | SYSTOLIC BLOOD PRESSURE: 110 MMHG | BODY MASS INDEX: 37.15 KG/M2

## 2018-04-17 DIAGNOSIS — L73.9 FOLLICULITIS: ICD-10-CM

## 2018-04-17 DIAGNOSIS — L73.2 HIDRADENITIS SUPPURATIVA OF LEFT AXILLA: Primary | ICD-10-CM

## 2018-04-17 PROCEDURE — G0463 HOSPITAL OUTPT CLINIC VISIT: HCPCS | Mod: ZF

## 2018-04-17 PROCEDURE — 87077 CULTURE AEROBIC IDENTIFY: CPT | Performed by: FAMILY MEDICINE

## 2018-04-17 PROCEDURE — 87186 SC STD MICRODIL/AGAR DIL: CPT | Performed by: FAMILY MEDICINE

## 2018-04-17 PROCEDURE — 87070 CULTURE OTHR SPECIMN AEROBIC: CPT | Performed by: FAMILY MEDICINE

## 2018-04-17 RX ORDER — PROPRANOLOL HYDROCHLORIDE 80 MG/1
80 CAPSULE, EXTENDED RELEASE ORAL AT BEDTIME
COMMUNITY
End: 2019-10-26

## 2018-04-17 RX ORDER — LURASIDONE HYDROCHLORIDE 60 MG/1
TABLET, FILM COATED ORAL DAILY
COMMUNITY
End: 2018-11-27

## 2018-04-17 RX ORDER — MUPIROCIN 20 MG/G
OINTMENT TOPICAL 3 TIMES DAILY
Qty: 15 G | Refills: 1 | Status: SHIPPED | OUTPATIENT
Start: 2018-04-17 | End: 2018-06-10

## 2018-04-17 ASSESSMENT — PAIN SCALES - GENERAL: PAINLEVEL: SEVERE PAIN (7)

## 2018-04-17 NOTE — NURSING NOTE
"Chief Complaint   Patient presents with     RECHECK     Pt c/o left armpit \"wound\" that will not heal. Pt states area is painful.   "

## 2018-04-17 NOTE — LETTER
4/17/2018       RE: Cristine Caballero  [address]  [facility] [address]     Dear Colleague,    Thank you for referring your patient, Cristine Caballero, to the [facility] at [facility]. Please see a copy of my visit note below.    Cristine is a 27 year old female G0 who presents with a sore in her left armpit:     - States a Hair follicle got infected in the left axilla and it is painful and annoying.  Bothersome for 1-2 weeks. No improvement, in fact larger in the last day. No previously hx of axilla infections however has gained > 40 pounds in the last two years.   - Frustrated over weight gain on psych medication: Lurasidone and venlafaxine  -  Works out 2-3 times a week.  Limits grains.  Eats well.   Wt Readings from Last 5 Encounters:   04/17/18 223 lb (101.2 kg)   11/20/17 222 lb (100.7 kg)   09/20/17 205 lb (93 kg)   09/01/16 198 lb (89.8 kg)   02/02/16 180 lb 4.8 oz (81.8 kg)   HX:  - Major Depression, recurrent: Multiple psych hospitalizations: exfexpr     Psychiatrist; Dr. JULIO C Patel     Psychologist; Franca at [facility].     DBT group    Hospitalized ANW with suicidal ideation.  Has undergone ECT.   - Tremor, Dr. Cardozo, neurologist on Propanolol 80 mg ER and 40 MG TID  ROS:  General: none  Head/Eyes: none  Ears/Nose/Throat: none  Cardiovascular: none  Respiratory: none  Gastrointestinal: bloating and gas improved  Skin: Left axilla   Neurological: none  Mental Health:States she feels the best she has felt in 5 years.   Endocrine: none  OB/GYN HISTORY:   G0.   PAST MEDICAL HISTORY:  Past Medical History:   Diagnosis Date     Anxiety disorder      Environmental allergies      Hx of eating disorder      Mild major depression (H)     ANW     OCD (obsessive compulsive disorder)     stable with CBT      Suicide attempt by multiple drug overdose (H) 4.21.2016     Vitamin D deficiency    Life Style Modifiers:   Tobacco:  reports that she has never  "smoked. She has never used smokeless tobacco.   Alcohol:  reports that she drinks about 1.5 oz of alcohol per week    Drug use:  reports that she does not use illicit drugs.  PAST SURGICAL HISTORY:  Past Surgical History:   Procedure Laterality Date     biopsy  8-    vulvar     BIOPSY      GI, vulva- OK     COLONOSCOPY  2011     DENTAL SURGERY  2011     ENT SURGERY  2014    intubation & NG tube     ESOPHAGOSCOPY, GASTROSCOPY, DUODENOSCOPY (EGD), COMBINED  10/11/2011    Procedure:COMBINED ESOPHAGOSCOPY, GASTROSCOPY, DUODENOSCOPY (EGD), BIOPSY SINGLE OR MULTIPLE; Surgeon:TALA MONTEMAYOR; Location:[facility]     HC BREATH HYDROGEN TEST  10/4/2011    Procedure:HYDROGEN BREATH TEST; Surgeon:RADHA EPSTEIN; Location:[facility]     LAPAROSCOPY       ORTHOPEDIC SURGERY      left wrist- screw in left scaphiod     TONSILLECTOMY       WRIST SURGERY  2004    screw placed L wrist after fracture   SOCIAL HISTORY:  Living in residential home. Mother is not living and minimal contact with her father.  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   MEDICATIONS:  Current Outpatient Prescriptions   Medication     lurasidone (LATUDA) 60 MG TABS tablet     propranolol (INDERAL LA) 80 MG 24 hr capsule     mupirocin (BACTROBAN) 2 % ointment     Venlafaxine HCl (EFFEXOR PO)     Omega-3 Fatty Acids (OMEGA-3 FISH OIL PO)     Cholecalciferol (VITAMIN D) 2000 UNITS tablet     No current facility-administered medications for this visit.    ALLERGIES:  Codeine; Corn dextrin; Gluten meal; Penicillin g; and Sulfa drugs  VITALS:  /77  Pulse 58  Ht 5' 5\" (165.1 cm)  Wt 223 lb (101.2 kg)  BMI 37.11 kg/m2  PHYSICAL EXAM:  Constitutional: Well appearing woman in no acute distress.   Psychological: appropriate mood.  Eyes: anicteric  Left axilla has a shallow open wound ~ 1-2 mm  Musculoskeletal: full range of motion    Skin: no concerning lesions, no jaundice.  Neurological: normal gait, no tremor.   Diagnoses and associated orders for " this visit:  Folliculitis vs Hidradenitis suppurativa of left axilla  -     mupirocin (BACTROBAN) 2 % ointment; Apply topically 3 times daily after cleaning well.         -     Wants to avoid oral ABX. Will call in 3-4 days if no improvement and will consider ABX        -     Wound culture obtain        -     Consider dermatology if problems persists   Discussed weight gain and encouraged 10,000 step a day and continuation with dietary changes.       Again, thank you for allowing me to participate in the care of your patient.      Sincerely,    Natalie Desai MD

## 2018-04-17 NOTE — MR AVS SNAPSHOT
"              After Visit Summary   4/17/2018    Cristine Caballero    MRN: [id number]           Patient Information     Date Of Birth          1990        Visit Information        Provider Department      4/17/2018 9:00 AM Natalie Desai MD [facility]        Today's Diagnoses     Hidradenitis suppurativa of left axilla    -  1    Folliculitis           Follow-ups after your visit        Who to contact     Please call your clinic at [phone] to:    Ask questions about your health    Make or cancel appointments    Discuss your medicines    Learn about your test results    Speak to your doctor            Additional Information About Your Visit        MyChart Information     [employer] gives you secure access to your electronic health record. If you see a primary care provider, you can also send messages to your care team and make appointments. If you have questions, please call your primary care clinic.  If you do not have a primary care provider, please call [phone] and they will assist you.      [employer] is an electronic gateway that provides easy, online access to your medical records. With [employer], you can request a clinic appointment, read your test results, renew a prescription or communicate with your care team.     To access your existing account, please contact your [facility] or call [phone] for assistance.        Care EveryWhere ID     This is your Care EveryWhere ID. This could be used by other organizations to access your [place] medical records  [id number]        Your Vitals Were     Pulse Height BMI (Body Mass Index)             58 5' 5\" (165.1 cm) 37.11 kg/m2          Blood Pressure from Last 3 Encounters:   04/17/18 110/77   11/20/17 113/77   09/20/17 114/74    Weight from Last 3 Encounters:   04/17/18 223 lb (101.2 kg)   11/20/17 222 lb (100.7 kg)   09/20/17 205 lb (93 kg)              We Performed the Following     Wound " Culture Aerobic Bacterial          Today's Medication Changes          These changes are accurate as of 4/17/18  9:49 AM.  If you have any questions, ask your nurse or doctor.               Start taking these medicines.        Dose/Directions    mupirocin 2 % ointment   Commonly known as:  BACTROBAN   Used for:  Folliculitis   Started by:  Natalie Desai MD        Apply topically 3 times daily   Quantity:  15 g   Refills:  1         These medicines have changed or have updated prescriptions.        Dose/Directions    vitamin D 2000 units tablet   This may have changed:    - how much to take  - additional instructions   Used for:  Unspecified vitamin D deficiency        Dose:  1 tablet   Take 1 tablet by mouth daily Take one tablet daily.   Quantity:  90 tablet   Refills:  3            Where to get your medicines      These medications were sent to [facility] [address] [place] [address] AT [facility] & [address] [place] [id number]     Phone:  [phone]     mupirocin 2 % ointment                Primary Care Provider Office Phone # Fax #    Natalie Desai [phone]       [address] [address]        Equal Access to Services     TYREL VAUGHN AH: Hadii bryce cerda hadasho Sofelizali, waaxda luqadaha, qaybta kaalmada adeegyada, hetal marcos. So [facility] [phone].    ATENCIÓN: Si habla español, tiene a carranza disposición servicios gratuitos de asistencia lingüística. [facility] [phone].    We comply with applicable federal civil rights laws and [place] laws. We do not discriminate on the basis of race, color, national origin, age, disability, sex, sexual orientation, or gender identity.            Thank you!     Thank you for choosing [facility]  for your care. Our goal is always to provide you with excellent care. Hearing back from our patients is one way we can  continue to improve our services. Please take a few minutes to complete the written survey that you may receive in the mail after your visit with us. Thank you!             Your Updated Medication List - Protect others around you: Learn how to safely use, store and throw away your medicines at www.disposemymeds.org.          This list is accurate as of 4/17/18  9:49 AM.  Always use your most recent med list.                   Brand Name Dispense Instructions for use Diagnosis    EFFEXOR PO      Take 150 mg by mouth daily Take 2 tablets        LATUDA 60 MG Tabs tablet   Generic drug:  lurasidone      Take by mouth daily        mupirocin 2 % ointment    BACTROBAN    15 g    Apply topically 3 times daily    Folliculitis       OMEGA-3 FISH OIL PO      Take 2 g by mouth 2 times daily        propranolol 80 MG 24 hr capsule    INDERAL LA     Take by mouth daily        vitamin D 2000 units tablet     90 tablet    Take 1 tablet by mouth daily Take one tablet daily.    Unspecified vitamin D deficiency

## 2018-04-17 NOTE — PROGRESS NOTES
Cristine is a 27 year old female G0 who presents with a sore in her left armpit:     - States a Hair follicle got infected in the left axilla and it is painful and annoying.  Bothersome for 1-2 weeks. No improvement, in fact larger in the last day. No previously hx of axilla infections however has gained > 40 pounds in the last two years.   - Frustrated over weight gain on psych medication: Lurasidone and venlafaxine  -  Works out 2-3 times a week.  Limits grains.  Eats well.   Wt Readings from Last 5 Encounters:   04/17/18 223 lb (101.2 kg)   11/20/17 222 lb (100.7 kg)   09/20/17 205 lb (93 kg)   09/01/16 198 lb (89.8 kg)   02/02/16 180 lb 4.8 oz (81.8 kg)   HX:  - Major Depression, recurrent: Multiple psych hospitalizations: exfexpr     Psychiatrist; Dr. JULIO C Patel     Psychologist; Franca at [facility].     DBT group    Hospitalized ANW with suicidal ideation.  Has undergone ECT.   - Tremor, Dr. Cardozo, neurologist on Propanolol 80 mg ER and 40 MG TID  ROS:  General: none  Head/Eyes: none  Ears/Nose/Throat: none  Cardiovascular: none  Respiratory: none  Gastrointestinal: bloating and gas improved  Skin: Left axilla   Neurological: none  Mental Health:States she feels the best she has felt in 5 years.   Endocrine: none  OB/GYN HISTORY:   G0.   PAST MEDICAL HISTORY:  Past Medical History:   Diagnosis Date     Anxiety disorder      Environmental allergies      Hx of eating disorder      Mild major depression (H)     ANW     OCD (obsessive compulsive disorder)     stable with CBT      Suicide attempt by multiple drug overdose (H) 4.21.2016     Vitamin D deficiency    Life Style Modifiers:   Tobacco:  reports that she has never smoked. She has never used smokeless tobacco.   Alcohol:  reports that she drinks about 1.5 oz of alcohol per week    Drug use:  reports that she does not use illicit drugs.  PAST SURGICAL HISTORY:  Past Surgical History:   Procedure Laterality Date     biopsy  8-    vulvar     BIOPSY       "GI, vulva- OK     COLONOSCOPY  2011     DENTAL SURGERY  2011     ENT SURGERY  2014    intubation & NG tube     ESOPHAGOSCOPY, GASTROSCOPY, DUODENOSCOPY (EGD), COMBINED  10/11/2011    Procedure:COMBINED ESOPHAGOSCOPY, GASTROSCOPY, DUODENOSCOPY (EGD), BIOPSY SINGLE OR MULTIPLE; Surgeon:TALA MONTEMAYOR; Location[facility]     HC BREATH HYDROGEN TEST  10/4/2011    Procedure:HYDROGEN BREATH TEST; Surgeon:RADHA EPSTEIN; Location[facility]     LAPAROSCOPY       ORTHOPEDIC SURGERY      left wrist- screw in left scaphiod     TONSILLECTOMY       WRIST SURGERY  2004    screw placed L wrist after fracture   SOCIAL HISTORY:  Living in residential home. Mother is not living and minimal contact with her father.  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   MEDICATIONS:  Current Outpatient Prescriptions   Medication     lurasidone (LATUDA) 60 MG TABS tablet     propranolol (INDERAL LA) 80 MG 24 hr capsule     mupirocin (BACTROBAN) 2 % ointment     Venlafaxine HCl (EFFEXOR PO)     Omega-3 Fatty Acids (OMEGA-3 FISH OIL PO)     Cholecalciferol (VITAMIN D) 2000 UNITS tablet     No current facility-administered medications for this visit.    ALLERGIES:  Codeine; Corn dextrin; Gluten meal; Penicillin g; and Sulfa drugs  VITALS:  /77  Pulse 58  Ht 5' 5\" (165.1 cm)  Wt 223 lb (101.2 kg)  BMI 37.11 kg/m2  PHYSICAL EXAM:  Constitutional: Well appearing woman in no acute distress.   Psychological: appropriate mood.  Eyes: anicteric  Left axilla has a shallow open wound ~ 1-2 mm  Musculoskeletal: full range of motion    Skin: no concerning lesions, no jaundice.  Neurological: normal gait, no tremor.   Diagnoses and associated orders for this visit:  Folliculitis vs Hidradenitis suppurativa of left axilla  -     mupirocin (BACTROBAN) 2 % ointment; Apply topically 3 times daily after cleaning well.         -     Wants to avoid oral ABX. Will call in 3-4 days if no improvement and will consider ABX        -     Wound culture obtain   "      -     Consider dermatology if problems persists   Discussed weight gain and encouraged 10,000 step a day and continuation with dietary changes.

## 2018-04-21 LAB
BACTERIA SPEC CULT: ABNORMAL
BACTERIA SPEC CULT: ABNORMAL
SPECIMEN SOURCE: ABNORMAL

## 2018-05-30 ENCOUNTER — TELEPHONE (OUTPATIENT)
Dept: OBGYN | Facility: CLINIC | Age: 28
End: 2018-05-30

## 2018-05-30 NOTE — TELEPHONE ENCOUNTER
Received call from Cristine asking for dates of last TB test and Hepatitis B immunization.    Do not have record of hepatitis B immunization and this information was given to pt. Forwarded result of last TB test to her via email and mail per her request.

## 2018-06-06 ENCOUNTER — TRANSFERRED RECORDS (OUTPATIENT)
Dept: HEALTH INFORMATION MANAGEMENT | Facility: CLINIC | Age: 28
End: 2018-06-06

## 2018-06-10 ENCOUNTER — HOSPITAL ENCOUNTER (EMERGENCY)
Facility: CLINIC | Age: 28
Discharge: HOME | End: 2018-06-10
Attending: EMERGENCY MEDICINE | Admitting: EMERGENCY MEDICINE
Payer: COMMERCIAL

## 2018-06-10 VITALS
RESPIRATION RATE: 16 BRPM | DIASTOLIC BLOOD PRESSURE: 74 MMHG | TEMPERATURE: 98.5 F | OXYGEN SATURATION: 99 % | HEART RATE: 61 BPM | SYSTOLIC BLOOD PRESSURE: 107 MMHG | HEIGHT: 65 IN

## 2018-06-10 DIAGNOSIS — S61.211A LACERATION OF LEFT INDEX FINGER WITHOUT FOREIGN BODY WITHOUT DAMAGE TO NAIL, INITIAL ENCOUNTER: ICD-10-CM

## 2018-06-10 PROCEDURE — 99282 EMERGENCY DEPT VISIT SF MDM: CPT | Performed by: EMERGENCY MEDICINE

## 2018-06-10 PROCEDURE — 12001 RPR S/N/AX/GEN/TRNK 2.5CM/<: CPT | Mod: Z6 | Performed by: EMERGENCY MEDICINE

## 2018-06-10 PROCEDURE — 99282 EMERGENCY DEPT VISIT SF MDM: CPT | Mod: 25 | Performed by: EMERGENCY MEDICINE

## 2018-06-10 PROCEDURE — 12001 RPR S/N/AX/GEN/TRNK 2.5CM/<: CPT | Performed by: EMERGENCY MEDICINE

## 2018-06-10 RX ORDER — LIDOCAINE HYDROCHLORIDE 10 MG/ML
INJECTION, SOLUTION EPIDURAL; INFILTRATION; INTRACAUDAL; PERINEURAL
Status: DISCONTINUED
Start: 2018-06-10 | End: 2018-06-11 | Stop reason: HOSPADM

## 2018-06-10 ASSESSMENT — ENCOUNTER SYMPTOMS
CHILLS: 0
BRUISES/BLEEDS EASILY: 0
FEVER: 0
WOUND: 1

## 2018-06-10 NOTE — ED AVS SNAPSHOT
[facility], [place], Emergency Department    [address] [place] [id number]    Phone:  [phone]                                       Cristine Caballero   MRN: [id number]    Department:  [facility], Emergency Department   Date of Visit:  6/10/2018           After Visit Summary Signature Page     I have received my discharge instructions, and my questions have been answered. I have discussed any challenges I see with this plan with the nurse or doctor.    ..........................................................................................................................................  Patient/Patient Representative Signature      ..........................................................................................................................................  Patient Representative Print Name and Relationship to Patient    ..................................................               ................................................  Date                                            Time    ..........................................................................................................................................  Reviewed by Signature/Title    ...................................................              ..............................................  Date                                                            Time

## 2018-06-10 NOTE — ED AVS SNAPSHOT
[facility], Emergency Department    [address] [facility] [address]    Phone:  [phone]                                       Cristine Caballero   MRN: [id number]    Department:  [facility], Emergency Department   Date of Visit:  6/10/2018           Patient Information     Date Of Birth          1990        Your diagnoses for this visit were:     Laceration of left index finger without foreign body without damage to nail, initial encounter        You were seen by Cyndy Varela MD.        Discharge Instructions       Thank you for coming to the [facility] Emergency Department.           *LACERATION (All: sutures, staples, tape, glue)    A laceration is a cut through the skin. This will usually require stitches (sutures) or staples if it is deep. Minor cuts may be treated with a tape closure ( Steri-Strips ) or Dermabond skin glue.                            HOME CARE:  1. EXTREMITY, FACE or TRUNK WOUNDS: Keep the wound clean and dry. If a bandage was applied and it becomes wet or dirty, replace it. Otherwise, leave it in place for the first 24 hours.    If stitches or staples were used, clean the wound daily. Protect the wound from sunlight and tanning lamps.    After removing the bandage, wash the area with soap and water. Use a wet cotton swab (Q tip) to loosen and remove any blood or crust that forms.    After cleaning, apply a thin layer of Aquafor or Vaseline ointment, or Polysporin or Bacitracin ointment. This will keep the wound clean and make it easier to remove the stitches or staples. Reapply a fresh bandage.    You may remove the bandage to shower as usual after the first 24 hours, but do not soak the area in water (no swimming) until the stitches or staples are removed.    2. You may use acetaminophen (Tylenol) 650-1000 mg every 6 hours or ibuprofen (Motrin, Advil) 600 mg every 6-8 hours with food to control pain, if you are able to take these  medicines. [NOTE: If you have chronic liver or kidney disease or ever had a stomach ulcer or GI bleeding, talk with your doctor before using these medicines.]  Use sunscreen on the area for 6 months after the wound heals to keep the scar from getting darker.   FOLLOW UP:  Suture removal in 10-14 days.   GET PROMPT MEDICAL ATTENTION if any of the following occur:    Increasing pain in the wound    Redness, swelling or pus coming from the wound    Fever over 101 F (38.3 C) oral    If stitches or staples come apart or fall out or if Steri-Strips fall off before seven days    If the wound edges re-open    Bleeding not controlled by direct pressure    9018-1770 [employer], [address] [place] [address], [place] [address]. All rights reserved. This information is not intended as a substitute for professional medical care. Always follow your healthcare professional's instructions.      24 Hour Appointment Hotline       To make an appointment at any [facility], call [phone] ([phone]). If you don't have a family doctor or clinic, we will help you find one. [facility] are conveniently located to serve the needs of you and your family.             Review of your medicines      Our records show that you are taking the medicines listed below. If these are incorrect, please call your family doctor or clinic.        Dose / Directions Last dose taken    EFFEXOR PO   Dose:  150 mg        Take 150 mg by mouth daily Take 2 tablets   Refills:  0        LATUDA 60 MG Tabs tablet   Generic drug:  lurasidone        Take by mouth daily   Refills:  0        OMEGA-3 FISH OIL PO   Dose:  2 g        Take 2 g by mouth 2 times daily   Refills:  0        propranolol 80 MG 24 hr capsule   Commonly known as:  INDERAL LA   Dose:  80 mg        Take 80 mg by mouth daily   Refills:  0        vitamin D 2000 units tablet   Dose:  1 tablet   Quantity:  90 tablet        Take 1 tablet by mouth daily Take one tablet daily.    Refills:  3                Orders Needing Specimen Collection     None      Pending Results     No orders found from 6/8/2018 to 6/11/2018.            Pending Culture Results     No orders found from 6/8/2018 to 6/11/2018.            Pending Results Instructions     If you had any lab results that were not finalized at the time of your Discharge, you can call the ED Lab Result RN at [phone]. You will be contacted by this team for any positive Lab results or changes in treatment. The nurses are available 7 days a week from 10A to 6:30P.  You can leave a message 24 hours per day and they will return your call.        Thank you for choosing [facility]       Thank you for choosing [facility] for your care. Our goal is always to provide you with excellent care. Hearing back from our patients is one way we can continue to improve our services. Please take a few minutes to complete the written survey that you may receive in the mail after you visit with us. Thank you!        [employer] Information     [employer] gives you secure access to your electronic health record. If you see a primary care provider, you can also send messages to your care team and make appointments. If you have questions, please call your primary care clinic.  If you do not have a primary care provider, please call [phone] and they will assist you.        Care EveryWhere ID     This is your Care EveryWhere ID. This could be used by other organizations to access your [facility] medical records  [id number]        Equal Access to Services     RAKESH VAUGHN AH: Hadii bryce Guadarrama, wabobbi rodriguez, qaybta salvatorealhetal de la fuente. So [facility] [phone].    ATENCIÓN: Si habla español, tiene a carranza disposición servicios gratuitos de asistencia lingüística. Llame al [phone].    We comply with applicable federal civil rights laws and [place] laws. We do not discriminate on the basis of race, color, national  origin, age, disability, sex, sexual orientation, or gender identity.            After Visit Summary       This is your record. Keep this with you and show to your community pharmacist(s) and doctor(s) at your next visit.

## 2018-06-11 NOTE — ED PROVIDER NOTES
[place] EMERGENCY DEPARTMENT ([facility])  6/10/18   History     Chief Complaint   Patient presents with     Laceration     HPI  Cristine Caballero is a 28 year old otherwise healthy,right-hand dominant female who presents to the Emergency Department for evaluation of a left index finger laceration.  Patient was attempting to open a bag of frozen mangoes with a butter knife, when it slipped off and lacerated her finger.  Patient reports that this occurred at approximately 8:45 PM (40 minutes prior to arrival).  Per our records, patient's last tetanus immunization was in 2012.    I have reviewed the Medications, Allergies, Past Medical and Surgical History, and Social History in the ralali system.    Past Medical History:   Diagnosis Date     Anxiety      Anxiety disorder      Depressive disorder      Environmental allergies      Hx of eating disorder      IBS (irritable bowel syndrome)      Mild major depression (H)     ANW     OCD (obsessive compulsive disorder)     stable with CBT      Suicide attempt by multiple drug overdose (H) 4.21.2016     Vitamin D deficiency        Past Surgical History:   Procedure Laterality Date     biopsy  8-    vulvar     BIOPSY      GI, vulva- OK     COLONOSCOPY  2011     DENTAL SURGERY  2011     ENT SURGERY  2014    intubation & NG tube     ESOPHAGOSCOPY, GASTROSCOPY, DUODENOSCOPY (EGD), COMBINED  10/11/2011    Procedure:COMBINED ESOPHAGOSCOPY, GASTROSCOPY, DUODENOSCOPY (EGD), BIOPSY SINGLE OR MULTIPLE; Surgeon:TALA MONTEMAYOR; [facility]     HC BREATH HYDROGEN TEST  10/4/2011    Procedure:HYDROGEN BREATH TEST; Surgeon:RADHA EPSTEIN; Location[facility]     LAPAROSCOPY       ORTHOPEDIC SURGERY      left wrist- screw in left scaphiod     TONSILLECTOMY       WRIST SURGERY  2004    screw placed L wrist after fracture       Family History   Problem Relation Age of Onset     Depression Mother      Allergies Mother      Asthma Mother      Thyroid Disease Mother      Eye  Disorder Mother      Blood Disease Mother       essential thrombocytosis     CANCER Mother      AML, recent BMT     Anxiety Disorder Mother      MENTAL ILLNESS Mother      Hoarding- OCD     Depression Sister      Anxiety Disorder Sister      MENTAL ILLNESS Sister      OCD_ HOarding     Genitourinary Problems Sister      Asthma Sister      Eye Disorder Sister      Allergies Sister      Allergies Sister      MENTAL ILLNESS Sister      ADHD     Allergies Other      uncles     Depression Maternal Grandmother      HEART DISEASE Maternal Grandmother      Thyroid Disease Maternal Grandmother      Depression Other      aunt     Anxiety Disorder Other      Genitourinary Problems Other      aunt     Suicide Other      HEART DISEASE Maternal Grandfather      C.A.D. Maternal Grandfather      C.A.D. Paternal Grandfather      CANCER Paternal Grandfather      Eye Disorder Father      MENTAL ILLNESS Father      OCPD?       Social History   Substance Use Topics     Smoking status: Never Smoker     Smokeless tobacco: Never Used     Alcohol use 1.5 oz/week     3 Standard drinks or equivalent per week      Comment: 1-2 EtOH drinks 2 days per week       Current Facility-Administered Medications   Medication     lidocaine (PF) (XYLOCAINE) 1 % injection     Current Outpatient Prescriptions   Medication     Cholecalciferol (VITAMIN D) 2000 UNITS tablet     lurasidone (LATUDA) 60 MG TABS tablet     Omega-3 Fatty Acids (OMEGA-3 FISH OIL PO)     propranolol (INDERAL LA) 80 MG 24 hr capsule     Venlafaxine HCl (EFFEXOR PO)        Allergies   Allergen Reactions     Codeine Other (See Comments)     Other reaction(s): Tremors     Corn Dextrin      Gluten Meal GI Disturbance     Penicillin G      Sulfa Drugs          Review of Systems   Constitutional: Negative for chills and fever.   Skin: Positive for wound (laceration to left index finger).   Allergic/Immunologic: Negative for immunocompromised state.   Hematological: Does not bruise/bleed  "easily.       Physical Exam   BP: 111/82  Pulse: 61  Temp: 98.5  F (36.9  C)  Resp: 16  Height: 165.1 cm (5' 5\")  SpO2: 99 %      Physical Exam   Gen:A&Ox3, no acute distress  Left hand with 1cm laceration on palmar side of 2nd digit over the middle phalanx. No foreign bodies. Intact tendon function. Finger sensation intact. Hand motor function normal.       ED Course   9:20 PM  The patient was seen and examined by Cyndy Varela MD in Room ED18.     ED Course     Procedures    Critical Care time:  none    Procedure Note:    Left 2nd finger, 1cm laceration.  The finger was anesthetized with a digital block as well as some local infiltration into the wound with 1% lidocaine.  The wound was irrigated with 500 mL of normal saline.  Explored, no tendon injuries noted.  Wound was closed with 3 sutures of 5-0 nylon.  Dressed with a tube gauze.       Assessments & Plan (with Medical Decision Making)   28-year-old, right-handed female presenting with laceration to her left second finger on the flexor surface.  No foreign bodies noted in the wound.  No tendon injuries and left finger is neurovascularly normal.  Wound irrigated and repaired as described above. Home wound care and return instructions reviewed.  Suture removal in intended for 14 days.    I have reviewed the nursing notes.    I have reviewed the findings, diagnosis, plan and need for follow up with the patient.    Discharge Medication List as of 6/10/2018 10:31 PM          Final diagnoses:   Laceration of left index finger without foreign body without damage to nail, initial encounter     IGideon, am serving as a trained medical scribe to document services personally performed by Cyndy Varela MD, based on the provider's statements to me.   ICyndy MD, was physically present and have reviewed and verified the accuracy of this note documented by Gideon Holland.    6/10/2018   [facility], EMERGENCY DEPARTMENT    Cyndy Varela MD " Cyndy Acevedo MD  06/10/18 [id number]

## 2018-06-11 NOTE — DISCHARGE INSTRUCTIONS
Thank you for coming to the [facility] Emergency Department.           *LACERATION (All: sutures, staples, tape, glue)    A laceration is a cut through the skin. This will usually require stitches (sutures) or staples if it is deep. Minor cuts may be treated with a tape closure ( Steri-Strips ) or Dermabond skin glue.                            HOME CARE:  1. EXTREMITY, FACE or TRUNK WOUNDS: Keep the wound clean and dry. If a bandage was applied and it becomes wet or dirty, replace it. Otherwise, leave it in place for the first 24 hours.    If stitches or staples were used, clean the wound daily. Protect the wound from sunlight and tanning lamps.    After removing the bandage, wash the area with soap and water. Use a wet cotton swab (Q tip) to loosen and remove any blood or crust that forms.    After cleaning, apply a thin layer of Aquafor or Vaseline ointment, or Polysporin or Bacitracin ointment. This will keep the wound clean and make it easier to remove the stitches or staples. Reapply a fresh bandage.    You may remove the bandage to shower as usual after the first 24 hours, but do not soak the area in water (no swimming) until the stitches or staples are removed.    2. You may use acetaminophen (Tylenol) 650-1000 mg every 6 hours or ibuprofen (Motrin, Advil) 600 mg every 6-8 hours with food to control pain, if you are able to take these medicines. [NOTE: If you have chronic liver or kidney disease or ever had a stomach ulcer or GI bleeding, talk with your doctor before using these medicines.]  Use sunscreen on the area for 6 months after the wound heals to keep the scar from getting darker.   FOLLOW UP:  Suture removal in 10-14 days.   GET PROMPT MEDICAL ATTENTION if any of the following occur:    Increasing pain in the wound    Redness, swelling or pus coming from the wound    Fever over 101 F (38.3 C) oral    If stitches or staples come apart or fall out or if Steri-Strips fall  off before seven days    If the wound edges re-open    Bleeding not controlled by direct pressure    8840-2801 [employer], [address] [place] [address], [place] [address]. All rights reserved. This information is not intended as a substitute for professional medical care. Always follow your healthcare professional's instructions.

## 2018-06-11 NOTE — ED TRIAGE NOTES
Pt presents to ED after cutting her left pointer finger with a knife while trying to open a bag. Bleeding controlled and new dressing applied.

## 2018-07-05 NOTE — PROGRESS NOTES
Cristine is a 28 year old female G0 who presents with weight concerns:     Frustrated over weight gain on psych medication: Lurasidone [60 mg] and venlafaxine [300 mg]  -  Works out 2-3 times a week.    -  Eats well. Limits grains. Paleo with cheese and beans  Wt Readings from Last 5 Encounters:   07/06/18 215 lb (97.5 kg)   04/17/18 223 lb (101.2 kg)   11/20/17 222 lb (100.7 kg)   09/20/17 205 lb (93 kg)   09/01/16 198 lb (89.8 kg)   Excessive perspiration: full body sweats   HX:  - Major Depression, recurrent: Multiple psych hospitalizations: [facility]     Psychiatrist; Dr. JULIO C Patel     Psychologist; Franca at [facility].     DBT group    Hospitalized ANW with suicidal ideation.  Has undergone ECT.   - Tremor, Dr. Cardozo, neurologist on Propanolol 80 mg ER and 40 MG TID  ROS:  General: none  Head/Eyes: none  Ears/Nose/Throat: none  Cardiovascular: none  Respiratory: none  Gastrointestinal: bloating and gas improved  Skin: Left axilla   Neurological: none  Mental Health:States she feels the best she has felt in 5 years.   Endocrine: none  OB/GYN HISTORY:   G0.   PAST MEDICAL HISTORY:  Past Medical History:   Diagnosis Date     Anxiety      Anxiety disorder      Depressive disorder      Environmental allergies      Hx of eating disorder      IBS (irritable bowel syndrome)      Mild major depression (H)     ANW     OCD (obsessive compulsive disorder)     stable with CBT      Suicide attempt by multiple drug overdose (H) 4.21.2016     Vitamin D deficiency    Life Style Modifiers:   Tobacco:  reports that she has never smoked. She has never used smokeless tobacco.   Alcohol:  reports that she drinks about 1.5 oz of alcohol per week    Drug use:  reports that she does not use illicit drugs.  PAST SURGICAL HISTORY:  Past Surgical History:   Procedure Laterality Date     biopsy  8-    vulvar     BIOPSY      GI, vulva- OK     COLONOSCOPY  2011     DENTAL SURGERY  2011     ENT SURGERY  2014    intubation & NG tube      "ESOPHAGOSCOPY, GASTROSCOPY, DUODENOSCOPY (EGD), COMBINED  10/11/2011    Procedure:COMBINED ESOPHAGOSCOPY, GASTROSCOPY, DUODENOSCOPY (EGD), BIOPSY SINGLE OR MULTIPLE; Surgeon:TALA MONTEMAYOR; Location[facility]     HC BREATH HYDROGEN TEST  10/4/2011    Procedure:HYDROGEN BREATH TEST; Surgeon:RADHA EPSTEIN; Location[facility]     LAPAROSCOPY       ORTHOPEDIC SURGERY      left wrist- screw in left scaphiod     TONSILLECTOMY       WRIST SURGERY  2004    screw placed L wrist after fracture   SOCIAL HISTORY:  Living in [facility]. 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   MEDICATIONS:  Current Outpatient Prescriptions   Medication     Cholecalciferol (VITAMIN D) 2000 UNITS tablet     hydrOXYzine (ATARAX) 25 MG tablet     lurasidone (LATUDA) 60 MG TABS tablet     Omega-3 Fatty Acids (OMEGA-3 FISH OIL PO)     propranolol (INDERAL LA) 80 MG 24 hr capsule     Venlafaxine HCl (EFFEXOR PO)     No current facility-administered medications for this visit.    ALLERGIES:  Codeine; Corn dextrin; Gluten meal; Penicillin g; and Sulfa drugs  VITALS:  /70 (BP Location: Right arm, Patient Position: Chair, Cuff Size: Adult Regular)  Pulse 60  Temp 98.2  F (36.8  C) (Oral)  Ht 5' 5\" (165.1 cm)  Wt 215 lb (97.5 kg)  SpO2 97%  BMI 35.78 kg/m2  PHYSICAL EXAM:  Constitutional: Well appearing woman in no acute distress.   Psychological: appropriate mood.  Eyes: anicteric  Musculoskeletal: full range of motion    Skin: no concerning lesions, no jaundice.  Neurological: normal gait, no tremor.   Diagnoses and associated orders for this visit:  < 210 is next goal - recheck in September 2018  Likely weight and sweating is secondary to medication [s].  Unable to change psych medications at this time.   Avoids corn - mostly low carb and higher protein  Continue with psych medications  Weight gain- however has lost 8 pounds in the last 2-3 months  Labs:   -     T3 Free  -     T4 free  -     TSH  -     Basic Metabolic Panel " ([facility])  Generalized hyperhidrosis: likely secondary to medications.

## 2018-07-06 ENCOUNTER — OFFICE VISIT (OUTPATIENT)
Dept: FAMILY MEDICINE | Facility: CLINIC | Age: 28
End: 2018-07-06
Payer: COMMERCIAL

## 2018-07-06 VITALS
TEMPERATURE: 98.2 F | WEIGHT: 215 LBS | SYSTOLIC BLOOD PRESSURE: 105 MMHG | HEART RATE: 60 BPM | OXYGEN SATURATION: 97 % | BODY MASS INDEX: 35.82 KG/M2 | DIASTOLIC BLOOD PRESSURE: 70 MMHG | HEIGHT: 65 IN

## 2018-07-06 DIAGNOSIS — R63.5 WEIGHT GAIN: Primary | ICD-10-CM

## 2018-07-06 DIAGNOSIS — R61 GENERALIZED HYPERHIDROSIS: ICD-10-CM

## 2018-07-06 DIAGNOSIS — L84 CALLUS OF FOOT: ICD-10-CM

## 2018-07-06 LAB
BUN SERPL-MCNC: 21 MG/DL (ref 5–24)
CALCIUM SERPL-MCNC: 9.2 MG/DL (ref 8.5–10.4)
CHLORIDE SERPLBLD-SCNC: 102 MMOL/L (ref 94–109)
CO2 SERPL-SCNC: 26 MMOL/L (ref 20–32)
CREAT SERPL-MCNC: 0.9 MG/DL (ref 0.6–1.3)
EGFR CALCULATED (BLACK REFERENCE): 95.2
EGFR CALCULATED (NON BLACK REFERENCE): 78.7
GLUCOSE SERPL-MCNC: 96 MG/DL (ref 60–109)
POTASSIUM SERPL-SCNC: 3.4 MMOL/L (ref 3.4–5.3)
SODIUM SERPL-SCNC: 139 MMOL/L (ref 133–144)
T3FREE SERPL-MCNC: 2.4 PG/ML (ref 2.3–4.2)
T4 FREE SERPL-MCNC: 1.23 NG/DL (ref 0.76–1.46)
TSH SERPL DL<=0.005 MIU/L-ACNC: 1.14 MU/L (ref 0.4–4)

## 2018-07-06 RX ORDER — HYDROXYZINE HYDROCHLORIDE 25 MG/1
25 TABLET, FILM COATED ORAL PRN
COMMUNITY
End: 2019-04-13

## 2018-07-06 ASSESSMENT — PAIN SCALES - GENERAL: PAINLEVEL: NO PAIN (0)

## 2018-07-06 ASSESSMENT — ANXIETY QUESTIONNAIRES
5. BEING SO RESTLESS THAT IT IS HARD TO SIT STILL: NOT AT ALL
6. BECOMING EASILY ANNOYED OR IRRITABLE: SEVERAL DAYS
2. NOT BEING ABLE TO STOP OR CONTROL WORRYING: NOT AT ALL
GAD7 TOTAL SCORE: 4
3. WORRYING TOO MUCH ABOUT DIFFERENT THINGS: SEVERAL DAYS
1. FEELING NERVOUS, ANXIOUS, OR ON EDGE: SEVERAL DAYS
7. FEELING AFRAID AS IF SOMETHING AWFUL MIGHT HAPPEN: SEVERAL DAYS

## 2018-07-06 ASSESSMENT — PATIENT HEALTH QUESTIONNAIRE - PHQ9: 5. POOR APPETITE OR OVEREATING: NOT AT ALL

## 2018-07-06 NOTE — MR AVS SNAPSHOT
"              After Visit Summary   7/6/2018    Cristine Caballero    MRN: [id number]           Patient Information     Date Of Birth          1990        Visit Information        Provider Department      7/6/2018 2:00 PM Natalie Desai MD [facility]        Today's Diagnoses     Weight gain    -  1    Generalized hyperhidrosis        Callus of foot          Care Instructions    podiatry:   Dr. Reese [facility]; [phone];  Vania Cedillo [phone];   Perico Page [phone].    medical arts: [facility]: [place] [phone]              Follow-ups after your visit        Who to contact     Please call your clinic at [phone] to:    Ask questions about your health    Make or cancel appointments    Discuss your medicines    Learn about your test results    Speak to your doctor            Additional Information About Your Visit        MyChart Information     [employer] gives you secure access to your electronic health record. If you see a primary care provider, you can also send messages to your care team and make appointments. If you have questions, please call your primary care clinic.  If you do not have a primary care provider, please call [phone] and they will assist you.      [employer] is an electronic gateway that provides easy, online access to your medical records. With [employer], you can request a clinic appointment, read your test results, renew a prescription or communicate with your care team.     To access your existing account, please contact your [facility] or call [phone] for assistance.        Care EveryWhere ID     This is your Care EveryWhere ID. This could be used by other organizations to access your [facility] medical records  [id number]        Your Vitals Were     Pulse Temperature Height Pulse Oximetry BMI (Body Mass Index)       60 98.2  F (36.8  C) (Oral) 5' 5\" (165.1 cm) 97% 35.78 kg/m2        Blood " Pressure from Last 3 Encounters:   07/06/18 105/70   06/10/18 107/74   04/17/18 110/77    Weight from Last 3 Encounters:   07/06/18 215 lb (97.5 kg)   04/17/18 223 lb (101.2 kg)   11/20/17 222 lb (100.7 kg)              We Performed the Following     Basic Metabolic Panel ([facility])     T3 Free     T4 free     TSH          Today's Medication Changes          These changes are accurate as of 7/6/18  2:34 PM.  If you have any questions, ask your nurse or doctor.               These medicines have changed or have updated prescriptions.        Dose/Directions    vitamin D 2000 units tablet   This may have changed:    - how much to take  - additional instructions   Used for:  Unspecified vitamin D deficiency        Dose:  1 tablet   Take 1 tablet by mouth daily Take one tablet daily.   Quantity:  90 tablet   Refills:  3                Primary Care Provider Office Phone # Fax #    Natalie Desai [phone]       [address] [place] [address]        Equal Access to Services     RAKESH VAUGHN : Hadii bryce cerda hadasho Sofelizali, waaxda luqadaha, qaybta kaalmada adeegyada, hetal sparrow . So [facility] [phone].    ATENCIÓN: Si habla español, tiene a carranza disposición servicios gratuitos de asistencia lingüística. Llame al [phone].    We comply with applicable federal civil rights laws and [place] laws. We do not discriminate on the basis of race, color, national origin, age, disability, sex, sexual orientation, or gender identity.            Thank you!     Thank you for choosing [facility]  for your care. Our goal is always to provide you with excellent care. Hearing back from our patients is one way we can continue to improve our services. Please take a few minutes to complete the written survey that you may receive in the mail after your visit with us. Thank you!             Your Updated Medication List - Protect others around you: Learn how to safely use,  store and throw away your medicines at www.disposemymeds.org.          This list is accurate as of 7/6/18  2:34 PM.  Always use your most recent med list.                   Brand Name Dispense Instructions for use Diagnosis    EFFEXOR PO      Take 150 mg by mouth daily Take 2 tablets        hydrOXYzine 25 MG tablet    ATARAX     Take 25 mg by mouth as needed for itching        LATUDA 60 MG Tabs tablet   Generic drug:  lurasidone      Take by mouth daily        OMEGA-3 FISH OIL PO      Take 2 g by mouth 2 times daily        propranolol 80 MG 24 hr capsule    INDERAL LA     Take 40 mg by mouth 3 times daily        vitamin D 2000 units tablet     90 tablet    Take 1 tablet by mouth daily Take one tablet daily.    Unspecified vitamin D deficiency

## 2018-07-06 NOTE — PATIENT INSTRUCTIONS
podiatry:   Dr. Reese [facility]; [phone];  Vania Cedillo [phone];   Perico Page [phone].    medical arts: [facility]: [place] [phone]

## 2018-07-06 NOTE — NURSING NOTE
"28 year old  Chief Complaint   Patient presents with     Weight Problem     Wants to discuss weight gain.      Excessive Sweating     Pt says she has excessive perspiration and not sure why.        Blood pressure 105/70, pulse 60, temperature 98.2  F (36.8  C), temperature source Oral, height 5' 5\" (165.1 cm), weight 215 lb (97.5 kg), SpO2 97 %, not currently breastfeeding. Body mass index is 35.78 kg/(m^2).  Patient Active Problem List   Diagnosis     Generalized anxiety disorder     OCD (obsessive compulsive disorder)     Moderate recurrent major depression (H)     Supraventricular tachycardia (H)     Environmental allergies     Vaginal discharge     Irritable bowel syndrome     Intestinal bacterial overgrowth     Depression with suicidal ideation     Small intestinal bacterial overgrowth       Wt Readings from Last 2 Encounters:   07/06/18 215 lb (97.5 kg)   04/17/18 223 lb (101.2 kg)     BP Readings from Last 3 Encounters:   07/06/18 105/70   06/10/18 107/74   04/17/18 110/77         Current Outpatient Prescriptions   Medication     Cholecalciferol (VITAMIN D) 2000 UNITS tablet     hydrOXYzine (ATARAX) 25 MG tablet     lurasidone (LATUDA) 60 MG TABS tablet     Omega-3 Fatty Acids (OMEGA-3 FISH OIL PO)     propranolol (INDERAL LA) 80 MG 24 hr capsule     Venlafaxine HCl (EFFEXOR PO)     No current facility-administered medications for this visit.        Social History   Substance Use Topics     Smoking status: Never Smoker     Smokeless tobacco: Never Used     Alcohol use 1.5 oz/week     3 Standard drinks or equivalent per week      Comment: 1-2 EtOH drinks 2 days per week       Health Maintenance Due   Topic Date Due     DEPRESSION ACTION PLAN Q1 YR  04/18/2007     HIV SCREEN (SYSTEM ASSIGNED)  04/18/2007     PHQ-9 Q1 MONTH  09/18/2015     PHQ-9 Q3 MONTHS  11/18/2015       Lab Results   Component Value Date    PAP NIL 09/01/2016       Kathy Garcia MA  July 6, 2018 2:05 PM    "

## 2018-07-07 ASSESSMENT — ANXIETY QUESTIONNAIRES: GAD7 TOTAL SCORE: 4

## 2018-07-07 ASSESSMENT — PATIENT HEALTH QUESTIONNAIRE - PHQ9: SUM OF ALL RESPONSES TO PHQ QUESTIONS 1-9: 3

## 2018-08-16 ENCOUNTER — TELEPHONE (OUTPATIENT)
Dept: GASTROENTEROLOGY | Facility: CLINIC | Age: 28
End: 2018-08-16

## 2018-08-16 ENCOUNTER — OFFICE VISIT (OUTPATIENT)
Dept: FAMILY MEDICINE | Facility: CLINIC | Age: 28
End: 2018-08-16
Attending: FAMILY MEDICINE
Payer: COMMERCIAL

## 2018-08-16 VITALS
BODY MASS INDEX: 36.99 KG/M2 | SYSTOLIC BLOOD PRESSURE: 107 MMHG | DIASTOLIC BLOOD PRESSURE: 75 MMHG | WEIGHT: 222 LBS | HEART RATE: 73 BPM | HEIGHT: 65 IN

## 2018-08-16 DIAGNOSIS — K62.5 RECTAL BLEEDING: ICD-10-CM

## 2018-08-16 DIAGNOSIS — L73.2 HIDRADENITIS SUPPURATIVA: Primary | ICD-10-CM

## 2018-08-16 PROCEDURE — G0463 HOSPITAL OUTPT CLINIC VISIT: HCPCS | Mod: ZF

## 2018-08-16 RX ORDER — CLINDAMYCIN PHOSPHATE 11.9 MG/ML
SOLUTION TOPICAL 2 TIMES DAILY
Qty: 60 ML | Refills: 11 | Status: SHIPPED | OUTPATIENT
Start: 2018-08-16 | End: 2019-04-11

## 2018-08-16 RX ORDER — PROPRANOLOL HYDROCHLORIDE 40 MG/1
40 TABLET ORAL 3 TIMES DAILY
COMMUNITY
End: 2019-10-26

## 2018-08-16 ASSESSMENT — PAIN SCALES - GENERAL: PAINLEVEL: NO PAIN (0)

## 2018-08-16 NOTE — PROGRESS NOTES
Cristine is a 28 year old female G0 who presents with recurrent lesions in her axilla both L and R.    - Left axilla with three small new lesions and scarring   - Right axilla with a new tender erupting lesion    - Is not taking ABX and prefers not to given her history of SIBO  Sweats a lot with weight gain that is related to Lurasidone.  Hoping to reduce the dose.  Will be speaking with Psyciatry.     Wt Readings from Last 5 Encounters:   08/16/18 100.7 kg (222 lb)   07/06/18 97.5 kg (215 lb)   04/17/18 101.2 kg (223 lb)   11/20/17 100.7 kg (222 lb)   09/20/17 93 kg (205 lb)   HX:  - Major Depression, recurrent[facility]     Psychiatrist; Dr. JULIO C Patel     Psychologist; Franca at [facility].     DBT group    Hospitalized ANW with suicidal ideation.  Has undergone ECT.   ROS:  General: none  Head/Eyes: none  Ears/Nose/Throat: none  Cardiovascular: none  Respiratory: none  Gastrointestinal: bloating and gas improved  Skin: Left axilla with lesions and right too  Neurological: none  Mental Health:States she feels the best she has felt in 5 years.   Endocrine: none  OB/GYN HISTORY:   G0.   PAST MEDICAL HISTORY:  Past Medical History:   Diagnosis Date     Anxiety      Anxiety disorder      Depressive disorder      Environmental allergies      Hx of eating disorder      IBS (irritable bowel syndrome)      Mild major depression (H)     ANW     OCD (obsessive compulsive disorder)     stable with CBT      Suicide attempt by multiple drug overdose (H) 4.21.2016     Vitamin D deficiency    Life Style Modifiers:   Tobacco:  reports that she has never smoked. She has never used smokeless tobacco.   Alcohol:  reports that she drinks about 1.5 oz of alcohol per week    Drug use:  reports that she does not use illicit drugs.  PAST SURGICAL HISTORY:  Past Surgical History:   Procedure Laterality Date     biopsy  8-    vulvar     BIOPSY      GI, vulva- OK     COLONOSCOPY  2011     DENTAL  "SURGERY  2011     ENT SURGERY  2014    intubation & NG tube     ESOPHAGOSCOPY, GASTROSCOPY, DUODENOSCOPY (EGD), COMBINED  10/11/2011    Procedure:COMBINED ESOPHAGOSCOPY, GASTROSCOPY, DUODENOSCOPY (EGD), BIOPSY SINGLE OR MULTIPLE; Surgeon:TALA MONTEMAYOR; Location[facility]     HC BREATH HYDROGEN TEST  10/4/2011    Procedure:HYDROGEN BREATH TEST; Surgeon:RADHA EPSTEIN; Location[facility]     LAPAROSCOPY       ORTHOPEDIC SURGERY      left wrist- screw in left scaphiod     TONSILLECTOMY       WRIST SURGERY  2004    screw placed L wrist after fracture   SOCIAL HISTORY:  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   MEDICATIONS:  Current Outpatient Prescriptions   Medication     Cholecalciferol (VITAMIN D) 2000 UNITS tablet     hydrOXYzine (ATARAX) 25 MG tablet     lurasidone (LATUDA) 60 MG TABS tablet     Omega-3 Fatty Acids (OMEGA-3 FISH OIL PO)     propranolol (INDERAL LA) 80 MG 24 hr capsule     Venlafaxine HCl (EFFEXOR PO)     No current facility-administered medications for this visit.    ALLERGIES:  Codeine; Corn dextrin; Gluten meal; Penicillin g; and Sulfa drugs  VITALS:  /75  Pulse 73  Ht 1.651 m (5' 5\")  Wt 100.7 kg (222 lb)  BMI 36.94 kg/m2  PHYSICAL EXAM:  Constitutional: Well appearing woman in no acute distress.   Psychological: appropriate mood.  Eyes: anicteric  Musculoskeletal: full range of motion    Skin: Left anxilla with three small pustules the right axilla has a deep one cm lesion that has not surfaced. Profuse sweating noted.   Neurological: normal gait, no tremor.   Diagnoses and associated orders for this visit:  Hidradenitis suppurativa  -     Antiseptics - may be helpful for HS. Chlorhexidine 3% [sample given]  -     Aluminum Chloride 12 % SOLN; Externally apply topically daily  -     clindamycin (CLEOCIN T) 1 % solution; Apply topically 2 times daily  Rectal bleeding         -     Colonoscopy advised   Other orders  -     propranolol (INDERAL) 40 MG tablet; " Take 40 mg by mouth 3 times daily

## 2018-08-16 NOTE — LETTER
8/16/2018       RE: Cristine Caballero  [address]  [facility] [address]     Dear Colleague,    Thank you for referring your patient, Cristine Caballero, to the [facility] at [facility]. Please see a copy of my visit note below.    Cristine is a 28 year old female G0 who presents with recurrent lesions in her axilla both L and R.    - Left axilla with three small new lesions and scarring   - Right axilla with a new tender erupting lesion    - Is not taking ABX and prefers not to given her history of SIBO  Sweats a lot with weight gain that is related to Lurasidone.  Hoping to reduce the dose.  Will be speaking with Psyciatry.     Wt Readings from Last 5 Encounters:   08/16/18 100.7 kg (222 lb)   07/06/18 97.5 kg (215 lb)   04/17/18 101.2 kg (223 lb)   11/20/17 100.7 kg (222 lb)   09/20/17 93 kg (205 lb)   HX:  - Major Depression, recurrent: Multiple psych hospitalizations: exfexpr     Psychiatrist; Dr. JULIO C Patel     Psychologist; Franca at [facility].     DBT group    Hospitalized [facility] with suicidal ideation.  Has undergone ECT.   OB/GYN HISTORY:   G0.   PAST MEDICAL HISTORY:  Past Medical History:   Diagnosis Date     Anxiety      Anxiety disorder      Depressive disorder      Environmental allergies      Hx of eating disorder      IBS (irritable bowel syndrome)      Mild major depression (H)     ANW     OCD (obsessive compulsive disorder)     stable with CBT      Suicide attempt by multiple drug overdose (H) 4.21.2016     Vitamin D deficiency    Life Style Modifiers:   Tobacco:  reports that she has never smoked. She has never used smokeless tobacco.   Alcohol:  reports that she drinks about 1.5 oz of alcohol per week    Drug use:  reports that she does not use illicit drugs.  PAST SURGICAL HISTORY:  Past Surgical History:   Procedure Laterality Date     biopsy  8-    vulvar     BIOPSY      GI, vulva- OK     COLONOSCOPY  2011     DENTAL SURGERY   "2011     ENT SURGERY  2014    intubation & NG tube     ESOPHAGOSCOPY, GASTROSCOPY, DUODENOSCOPY (EGD), COMBINED  10/11/2011    Procedure:COMBINED ESOPHAGOSCOPY, GASTROSCOPY, DUODENOSCOPY (EGD), BIOPSY SINGLE OR MULTIPLE; Surgeon:TALA MONTEMAYOR; Location:[facility]     HC BREATH HYDROGEN TEST  10/4/2011    Procedure:HYDROGEN BREATH TEST; Surgeon:RADHA EPSTEIN; Location:[facility]     LAPAROSCOPY       ORTHOPEDIC SURGERY      left wrist- screw in left scaphiod     TONSILLECTOMY       WRIST SURGERY  2004    screw placed L wrist after fracture   SOCIAL HISTORY:  Living in [facility]. 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   MEDICATIONS:  Current Outpatient Prescriptions   Medication     Cholecalciferol (VITAMIN D) 2000 UNITS tablet     hydrOXYzine (ATARAX) 25 MG tablet     lurasidone (LATUDA) 60 MG TABS tablet     Omega-3 Fatty Acids (OMEGA-3 FISH OIL PO)     propranolol (INDERAL LA) 80 MG 24 hr capsule     Venlafaxine HCl (EFFEXOR PO)     No current facility-administered medications for this visit.    ALLERGIES:  Codeine; Corn dextrin; Gluten meal; Penicillin g; and Sulfa drugs  VITALS:  /75  Pulse 73  Ht 1.651 m (5' 5\")  Wt 100.7 kg (222 lb)  BMI 36.94 kg/m2  PHYSICAL EXAM:  Constitutional: Well appearing woman in no acute distress.   Psychological: appropriate mood.  Eyes: anicteric  Musculoskeletal: full range of motion    Skin: Left anxilla with three small pustules the right axilla has a deep one cm lesion that has not surfaced. Profuse sweating noted.   Neurological: normal gait, no tremor.   Diagnoses and associated orders for this visit:  Hidradenitis suppurativa  -     Antiseptics - may be helpful for HS. Chlorhexidine 3% [sample given]  -     Aluminum Chloride 12 % SOLN; Externally apply topically daily  -     clindamycin (CLEOCIN T) 1 % solution; Apply topically 2 times daily  Rectal bleeding         -     Colonoscopy advised   Other orders  -     propranolol (INDERAL) 40 MG tablet; Take 40 mg " by mouth 3 times daily      Again, thank you for allowing me to participate in the care of your patient.      Sincerely,    Natalie Desai MD

## 2018-08-16 NOTE — PATIENT INSTRUCTIONS
Dermatology Referral:  [facility]:  [phone]:  Vilma Escobedo [[place]] [phone]  [facility]: [place] [phone] [[facility]:  Chasity Wiggins   [facility]: Dr. Giulia Remy, [phone]    Protocol for under arns  Topical antiseptic washes such as chlorhexidine 3% [sample given]  topical clindamycin    Drysol deodorant [OTC] 12 %    Colonoscopy:   [facility] [phone].     [facility] endoscopy [phone]    [facility]: [phone] endoscopy    [facility]: [phone]

## 2018-08-16 NOTE — MR AVS SNAPSHOT
After Visit Summary   8/16/2018    Cristine Caballero    MRN: [id number]           Patient Information     Date Of Birth          1990        Visit Information        Provider Department      8/16/2018 8:00 AM Natalie Desai MD [facility]        Care Instructions    Dermatology Referral:  [facility]:  [phone]:  Vilma Escobedo [[place]] [phone]  [facility]: [place] [phone] [[facility]:  Chasity Wiggins   [facility]: Dr. Giulia Remy, [phone]      Topical antiseptic washes such as chlorhexidine 4%    topical clindamycin    Drysol deoderant          Follow-ups after your visit        Who to contact     Please call your clinic at [phone] to:    Ask questions about your health    Make or cancel appointments    Discuss your medicines    Learn about your test results    Speak to your doctor            Additional Information About Your Visit        [employer] Information     [employer] gives you secure access to your electronic health record. If you see a primary care provider, you can also send messages to your care team and make appointments. If you have questions, please call your primary care clinic.  If you do not have a primary care provider, please call [phone] and they will assist you.      [employer] is an electronic gateway that provides easy, online access to your medical records. With [employer], you can request a clinic appointment, read your test results, renew a prescription or communicate with your care team.     To access your existing account, please contact your [facility] or call [phone] for assistance.        Care EveryWhere ID     This is your Care EveryWhere ID. This could be used by other organizations to access your [place] medical records  [id number]        Your Vitals Were     Pulse Height BMI (Body Mass Index)             73 1.651  "m (5' 5\") 36.94 kg/m2          Blood Pressure from Last 3 Encounters:   08/16/18 107/75   07/06/18 105/70   06/10/18 107/74    Weight from Last 3 Encounters:   08/16/18 100.7 kg (222 lb)   07/06/18 97.5 kg (215 lb)   04/17/18 101.2 kg (223 lb)              Today, you had the following     No orders found for display         Today's Medication Changes          These changes are accurate as of 8/16/18  8:28 AM.  If you have any questions, ask your nurse or doctor.               These medicines have changed or have updated prescriptions.        Dose/Directions    vitamin D 2000 units tablet   This may have changed:    - how much to take  - additional instructions   Used for:  Unspecified vitamin D deficiency        Dose:  1 tablet   Take 1 tablet by mouth daily Take one tablet daily.   Quantity:  90 tablet   Refills:  3                Primary Care Provider Office Phone # Fax #    Natalie Desai [phone]       [address] [address]        Equal Access to Services     [facility]: Hadii bryce alcantaro Solew, waaxda luqadaha, qaybta kaalmada adezainab, hetal sparrow . So [facility] [phone].    ATENCIÓN: Si habla español, tiene a carranza disposición servicios gratuitos de asistencia lingüística. [facility] [phone].    We comply with applicable federal civil rights laws and [place] laws. We do not discriminate on the basis of race, color, national origin, age, disability, sex, sexual orientation, or gender identity.            Thank you!     Thank you for choosing [facility]  for your care. Our goal is always to provide you with excellent care. Hearing back from our patients is one way we can continue to improve our services. Please take a few minutes to complete the written survey that you may receive in the mail after your visit with us. Thank you!             Your Updated Medication List - Protect others around you: Learn " how to safely use, store and throw away your medicines at www.disposemymeds.org.          This list is accurate as of 8/16/18  8:28 AM.  Always use your most recent med list.                   Brand Name Dispense Instructions for use Diagnosis    EFFEXOR PO      Take 150 mg by mouth daily Take 2 tablets        hydrOXYzine 25 MG tablet    ATARAX     Take 25 mg by mouth as needed for itching        LATUDA 60 MG Tabs tablet   Generic drug:  lurasidone      Take by mouth daily        OMEGA-3 FISH OIL PO      Take 2 g by mouth 2 times daily        * propranolol 80 MG 24 hr capsule    INDERAL LA     Take 80 mg by mouth At Bedtime        * propranolol 40 MG tablet    INDERAL     Take 40 mg by mouth 3 times daily        vitamin D 2000 units tablet     90 tablet    Take 1 tablet by mouth daily Take one tablet daily.    Unspecified vitamin D deficiency       * Notice:  This list has 2 medication(s) that are the same as other medications prescribed for you. Read the directions carefully, and ask your doctor or other care provider to review them with you.

## 2018-08-16 NOTE — NURSING NOTE
Chief Complaint   Patient presents with     RECHECK     Pt here to discuss left axilla follow up. Pt c/o rectal bleeding.

## 2018-08-20 ENCOUNTER — TELEPHONE (OUTPATIENT)
Dept: GASTROENTEROLOGY | Facility: CLINIC | Age: 28
End: 2018-08-20

## 2018-08-22 ENCOUNTER — TELEPHONE (OUTPATIENT)
Dept: GASTROENTEROLOGY | Facility: CLINIC | Age: 28
End: 2018-08-22

## 2018-11-05 ENCOUNTER — TELEPHONE (OUTPATIENT)
Dept: OBGYN | Facility: CLINIC | Age: 28
End: 2018-11-05

## 2018-11-05 DIAGNOSIS — R30.0 DYSURIA: Primary | ICD-10-CM

## 2018-11-05 RX ORDER — NITROFURANTOIN 25; 75 MG/1; MG/1
100 CAPSULE ORAL 2 TIMES DAILY
Qty: 10 CAPSULE | Refills: 0 | Status: SHIPPED | OUTPATIENT
Start: 2018-11-05 | End: 2018-12-07

## 2018-11-05 NOTE — TELEPHONE ENCOUNTER
Left  amy Colunga to call nurse triage back to discuss s/s she has. She left  on triage line, saying she has had urinary frequency,burning with urination and blood in urine sine Friday evening.   She is allergic to Sulfa.    Asked her to call back to triage before we can prescribed an antibiotic.

## 2018-11-05 NOTE — TELEPHONE ENCOUNTER
Pt returned call to inform symptoms include burning, frequency, and hematuria with urination. Denies flank pain and fever.     Sending Macrobid per protocol to pt preferred pharmacy    Advised pt to call if symptoms do not improve in 48 hours.    Pt expressed understanding and agrees with plan

## 2018-11-06 ENCOUNTER — TRANSFERRED RECORDS (OUTPATIENT)
Dept: HEALTH INFORMATION MANAGEMENT | Facility: CLINIC | Age: 28
End: 2018-11-06

## 2018-11-06 NOTE — TELEPHONE ENCOUNTER
Patient called back and left another message on voicemail stating further symptoms. Nurse left another message for patient to go to ED or urgent care for evaluation.

## 2018-11-06 NOTE — TELEPHONE ENCOUNTER
Received return call from Cristine who reports she has not yet started the macrobid but is now experiencing nausea without vomiting and right sided back pain and tenderness to touch. Denies fever.    Advised nausea is likely unrelated. Spoke with Dr. Desai who recommends pt leave urine sample then start abx today. If develops fever/chills should present to ED.    Returned call to patient and left detailed message on pt voicemail with this information per her request.    Ordered UA/UC

## 2018-11-21 ENCOUNTER — TELEPHONE (OUTPATIENT)
Dept: OBGYN | Facility: CLINIC | Age: 28
End: 2018-11-21

## 2018-11-21 NOTE — TELEPHONE ENCOUNTER
----- Message from Martha Boykin sent at 11/20/2018  5:08 PM CST -----  Regarding: Kidney Inf Symptoms post Antibiotic  Contact: [phone]  Patient reports she was treated for kidney infection.  Finished antibiotic last week. Still has symptoms; burning, some bleeding on toilet paper, fatigue.  What should she do (msg left [place] @ 4:50 pm)

## 2018-11-21 NOTE — TELEPHONE ENCOUNTER
Tried to reach Ludy but received voicemail.  Advised she needs to be seen for her symptoms. Dr. Desai is out of the clinic today but there is availability with her colleague, Dr. Brannon. Please call back to discuss if you can come to clinic this morning.

## 2018-11-21 NOTE — TELEPHONE ENCOUNTER
Pt returned call to discuss symptoms. Dr. Brannon's opening no longer available.     Pt experiencing right sided flank pain, feels feverish with intermittent chills and sweats since completion of abx therapy on the 15th. Endorses fatigue, nausea, and burning with urination.     Advised pt with these symptoms she should be seen today. Patient will call [facility] as she is a Dr. Desai patient, if she can not get in there will go to urgency room. Pt has no further questions at this time.

## 2018-11-23 ENCOUNTER — NURSE TRIAGE (OUTPATIENT)
Dept: NURSING | Facility: CLINIC | Age: 28
End: 2018-11-23

## 2018-11-23 NOTE — TELEPHONE ENCOUNTER
Misdirected call for  [facility]: (located at [address] floor [address]) *don t triage them- they use our answering service [phone] ([phone])  and Pt looking to leave message only for 11/26/18 .   Verbalizes understanding and denies further questions and will call back if further symptoms to triage or questions  ..  Debora Enriquez RN  - [place] Nurse Advisor

## 2018-11-27 ENCOUNTER — OFFICE VISIT (OUTPATIENT)
Dept: FAMILY MEDICINE | Facility: CLINIC | Age: 28
End: 2018-11-27
Attending: FAMILY MEDICINE
Payer: COMMERCIAL

## 2018-11-27 VITALS
DIASTOLIC BLOOD PRESSURE: 79 MMHG | SYSTOLIC BLOOD PRESSURE: 129 MMHG | HEART RATE: 75 BPM | BODY MASS INDEX: 38.95 KG/M2 | HEIGHT: 65 IN | TEMPERATURE: 98.4 F | WEIGHT: 233.8 LBS

## 2018-11-27 DIAGNOSIS — K63.8219 SMALL INTESTINAL BACTERIAL OVERGROWTH: ICD-10-CM

## 2018-11-27 DIAGNOSIS — R53.83 FATIGUE, UNSPECIFIED TYPE: ICD-10-CM

## 2018-11-27 DIAGNOSIS — R53.83 FATIGUE, UNSPECIFIED TYPE: Primary | ICD-10-CM

## 2018-11-27 LAB
ANION GAP SERPL CALCULATED.3IONS-SCNC: 5 MMOL/L (ref 3–14)
BUN SERPL-MCNC: 23 MG/DL (ref 7–30)
CALCIUM SERPL-MCNC: 8.7 MG/DL (ref 8.5–10.1)
CHLORIDE SERPL-SCNC: 107 MMOL/L (ref 94–109)
CO2 SERPL-SCNC: 27 MMOL/L (ref 20–32)
CREAT SERPL-MCNC: 0.64 MG/DL (ref 0.52–1.04)
ERYTHROCYTE [DISTWIDTH] IN BLOOD BY AUTOMATED COUNT: 13.1 % (ref 10–15)
GFR SERPL CREATININE-BSD FRML MDRD: >90 ML/MIN/1.7M2
GLUCOSE SERPL-MCNC: 91 MG/DL (ref 70–99)
HCT VFR BLD AUTO: 40.5 % (ref 35–47)
HGB BLD-MCNC: 13.4 G/DL (ref 11.7–15.7)
MCH RBC QN AUTO: 30.2 PG (ref 26.5–33)
MCHC RBC AUTO-ENTMCNC: 33.1 G/DL (ref 31.5–36.5)
MCV RBC AUTO: 91 FL (ref 78–100)
PLATELET # BLD AUTO: 351 10E9/L (ref 150–450)
POTASSIUM SERPL-SCNC: 4.3 MMOL/L (ref 3.4–5.3)
RBC # BLD AUTO: 4.44 10E12/L (ref 3.8–5.2)
SODIUM SERPL-SCNC: 139 MMOL/L (ref 133–144)
T3FREE SERPL-MCNC: 2.4 PG/ML (ref 2.3–4.2)
T4 FREE SERPL-MCNC: 1.23 NG/DL (ref 0.76–1.46)
TSH SERPL DL<=0.005 MIU/L-ACNC: 1.88 MU/L (ref 0.4–4)
WBC # BLD AUTO: 6.8 10E9/L (ref 4–11)

## 2018-11-27 PROCEDURE — 84481 FREE ASSAY (FT-3): CPT | Performed by: FAMILY MEDICINE

## 2018-11-27 PROCEDURE — 36415 COLL VENOUS BLD VENIPUNCTURE: CPT | Performed by: FAMILY MEDICINE

## 2018-11-27 PROCEDURE — 85027 COMPLETE CBC AUTOMATED: CPT | Performed by: FAMILY MEDICINE

## 2018-11-27 PROCEDURE — 84439 ASSAY OF FREE THYROXINE: CPT | Performed by: FAMILY MEDICINE

## 2018-11-27 PROCEDURE — 84443 ASSAY THYROID STIM HORMONE: CPT | Performed by: FAMILY MEDICINE

## 2018-11-27 PROCEDURE — 80048 BASIC METABOLIC PNL TOTAL CA: CPT | Performed by: FAMILY MEDICINE

## 2018-11-27 PROCEDURE — G0463 HOSPITAL OUTPT CLINIC VISIT: HCPCS | Mod: ZF

## 2018-11-27 RX ORDER — LURASIDONE HYDROCHLORIDE 20 MG/1
TABLET, FILM COATED ORAL DAILY
COMMUNITY
End: 2019-04-01

## 2018-11-27 ASSESSMENT — PAIN SCALES - GENERAL: PAINLEVEL: NO PAIN (0)

## 2018-11-27 NOTE — PROGRESS NOTES
Cristine is a 28 year old female who presents with lingering symptoms after a UTI.   HPI:  Had a UTI 11.06.2018 and treated with ABX.  Returned to urgent care on 11.21.2018 [urine negative].  Continues to complain of back pain on the right, gas, bloating with extreme fatigue.  No energy and muscle aches in biceps. She has had SIBO in the past and reports these are typical symptoms. No fever or chills. No diarrhea.   Clear  [11.21.2018] Slightly Cloudy (A) [11/06.2018]   1.010 1.025   7.0 6.0   Normal Normal   Negative Negative   Negative Negative   Negative Trace (A)   Negative Negative   Negative Negative   Negative Negative   Negative Trace (A)     Moderate (A)     Moderate (A)     None Seen     6-10 (A)           Has taken rifaximin in the past and it has been helpful.  Would like to try this again   ROS:  General: see HPI.   Head/Eyes: none  Ears/Nose/Throat: none  Cardiovascular: none  Respiratory: none  Gastrointestinal: abdominal pain   GYN: asking about contraception  Mental Health: none = reports stable mood  PROBLEM LIST:  Patient Active Problem List   Diagnosis     Generalized anxiety disorder     OCD (obsessive compulsive disorder)     Moderate recurrent major depression (H)     Environmental allergies     Irritable bowel syndrome     Small intestinal bacterial overgrowth   PAST MEDICAL HISTORY:  Past Medical History:   Diagnosis Date     Anxiety      Anxiety disorder      Depressive disorder      Environmental allergies      Hx of eating disorder      IBS (irritable bowel syndrome)      Mild major depression (H)     ANW     OCD (obsessive compulsive disorder)     stable with CBT      Suicide attempt by multiple drug overdose (H) 4.21.2016     Vitamin D deficiency    Life Style Modifiers:   Tobacco:  reports that she has never smoked. She has never used smokeless tobacco.   Alcohol:  reports that she drinks about 1.5 oz of alcohol per week    Drug use:  reports that she does not use illicit drugs.  PAST  SURGICAL HISTORY:  Past Surgical History:   Procedure Laterality Date     biopsy  8-    vulvar     BIOPSY      GI, vulva- OK     COLONOSCOPY  2011     DENTAL SURGERY  2011     ENT SURGERY  2014    intubation & NG tube     ESOPHAGOSCOPY, GASTROSCOPY, DUODENOSCOPY (EGD), COMBINED  10/11/2011    Procedure:COMBINED ESOPHAGOSCOPY, GASTROSCOPY, DUODENOSCOPY (EGD), BIOPSY SINGLE OR MULTIPLE; Surgeon:TALA MONTEMAYOR; Location[facility]     HC BREATH HYDROGEN TEST  10/4/2011    Procedure:HYDROGEN BREATH TEST; Surgeon:RADHA EPSTEIN; Location:[facility]     LAPAROSCOPY       ORTHOPEDIC SURGERY      left wrist- screw in left scaphiod     TONSILLECTOMY       WRIST SURGERY  2004    screw placed L wrist after fracture   FAMILY HISTORY:  Family History   Problem Relation Age of Onset     Depression Mother      Allergies Mother      Asthma Mother      Thyroid Disease Mother      Eye Disorder Mother      Blood Disease Mother       essential thrombocytosis     Cancer Mother      AML, recent BMT     Anxiety Disorder Mother      Mental Illness Mother      Hoarding- OCD     Depression Sister      Anxiety Disorder Sister      Mental Illness Sister      OCD_ HOarding     Genitourinary Problems Sister      Asthma Sister      Eye Disorder Sister      Allergies Sister      Allergies Sister      Mental Illness Sister      ADHD     Depression Maternal Grandmother      HEART DISEASE Maternal Grandmother      Thyroid Disease Maternal Grandmother      Depression Other      aunt     Anxiety Disorder Other      Genitourinary Problems Other      aunt     Suicide Other      HEART DISEASE Maternal Grandfather      C.A.D. Maternal Grandfather      C.A.D. Paternal Grandfather      Cancer Paternal Grandfather      Eye Disorder Father      Mental Illness Father      OCPD?     Allergies Other      uncles   SOCIAL HISTORY:  Works at [employer]  Social History     Social History     Marital status: Single     Spouse name: N/A     Number of children: N/A     Years  "of education: N/A   MEDICATIONS:  Current Outpatient Prescriptions   Medication Sig Dispense Refill     lurasidone (LATUDA) 20 MG TABS tablet Take by mouth daily Take 1 and 1/2 tablets       Aluminum Chloride 12 % SOLN Externally apply topically daily 35.5 mL 1     Cholecalciferol (VITAMIN D) 2000 UNITS tablet Take 1 tablet by mouth daily Take one tablet daily. (Patient taking differently: Take 4,000 Units by mouth daily ) 90 tablet 3     clindamycin (CLEOCIN T) 1 % solution Apply topically 2 times daily 60 mL 11     hydrOXYzine (ATARAX) 25 MG tablet Take 25 mg by mouth as needed for itching       nitroFURantoin, macrocrystal-monohydrate, (MACROBID) 100 MG capsule Take 1 capsule (100 mg) by mouth 2 times daily 10 capsule 0     Omega-3 Fatty Acids (OMEGA-3 FISH OIL PO) Take 2 g by mouth 2 times daily        propranolol (INDERAL LA) 80 MG 24 hr capsule Take 80 mg by mouth At Bedtime        propranolol (INDERAL) 40 MG tablet Take 40 mg by mouth 3 times daily       Venlafaxine HCl (EFFEXOR PO) Take 150 mg by mouth daily Take 2 tablets     ALLERGIES:  Codeine; Corn dextrin; Gluten meal; Penicillin g; and Sulfa drugs  VITALS:  Blood pressure 129/79, pulse 75, temperature 98.4  F (36.9  C), temperature source Oral, height 1.651 m (5' 5\"), weight 106.1 kg (233 lb 12.8 oz), not currently breastfeeding.  PHYSICAL EXAM:  Constitutional: Overweight woman in NAD Neck: No thyroidmegaly. No jugular venous distension, no carotid bruits.  Cardiovascular: regular rate and rhythm, normal S1 and S2, no murmurs, rubs or gallops, peripheral pulses full and symmetric   Respiratory: clear to auscultation, no wheezes or crackles, normal breath sounds.  GI: Large abdomen without masses or guarding.   Musculoskeletal: full range of motion    Skin: no concerning lesions, no jaundice.  Neurological: normal gait, no tremor.   Diagnoses and associated orders for this visit:  Fatigue, unspecified type  -     T3 Free; Future  -     T4 free; " Future  -     TSH; Future  -     Basic Metabolic Panel; Future  -     CBC with Platelets; Future    Small intestinal bacterial overgrowth, likely  -     rifaximin (XIFAXAN) 550 MG TABS tablet; Take 1 tablet (550 mg) by mouth 2 times daily. [she will check on insurance coverage.  -     Discussed reducing carb intake and managing weight.     Other orders  -     lurasidone (LATUDA) 20 MG TABS tablet; Take by mouth daily Take 1 and 1/2 tablets    Contraception discussion   -   Reviewed options and the IUD, mirena is likely the best to consider.  She will make an appointment for insertion.

## 2018-11-27 NOTE — MR AVS SNAPSHOT
After Visit Summary   11/27/2018    Cristine Caballero    MRN: [id number]           Patient Information     Date Of Birth          1990        Visit Information        Provider Department      11/27/2018 2:00 PM Natalie Desai MD [facility]        Today's Diagnoses     Fatigue, unspecified type    -  1    Small intestinal bacterial overgrowth           Follow-ups after your visit        Your next 10 appointments already scheduled     Dec 07, 2018 11:00 AM CST   New Patient Visit with LAURIE Nieto CNP   [facility] ([facility])    [facility]  [address]  [address]  [facility] [address] [phone]            Dec 18, 2018  9:40 AM CST   Annual Visit with Natalie Desai MD   [facility] ([facility])    [facility]  [address]  [address]  [facility]  [facility] [address]   [phone]              Who to contact     Please call your clinic at [phone] to:    Ask questions about your health    Make or cancel appointments    Discuss your medicines    Learn about your test results    Speak to your doctor            Additional Information About Your Visit        [employer] Information     [employer] gives you secure access to your electronic health record. If you see a primary care provider, you can also send messages to your care team and make appointments. If you have questions, please call your primary care clinic.  If you do not have a primary care provider, please call [phone] and they will assist you.      [employer] is an electronic gateway that provides easy, online access to your medical records. With [employer], you can request a clinic appointment, read your test results, renew a prescription or communicate with your care team.     To access your existing account, please contact your [facility] or call  "[phone] for assistance.        Care EveryWhere ID     This is your Care EveryWhere ID. This could be used by other organizations to access your [place] medical records  [id number]        Your Vitals Were     Pulse Temperature Height BMI (Body Mass Index)          75 98.4  F (36.9  C) (Oral) 1.651 m (5' 5\") 38.91 kg/m2         Blood Pressure from Last 3 Encounters:   11/27/18 129/79   08/16/18 107/75   07/06/18 105/70    Weight from Last 3 Encounters:   11/27/18 106.1 kg (233 lb 12.8 oz)   08/16/18 100.7 kg (222 lb)   07/06/18 97.5 kg (215 lb)                 Today's Medication Changes          These changes are accurate as of 11/27/18 11:59 PM.  If you have any questions, ask your nurse or doctor.               Start taking these medicines.        Dose/Directions    rifaximin 550 MG Tabs tablet   Commonly known as:  XIFAXAN   Used for:  Small intestinal bacterial overgrowth   Started by:  Natalie Desai MD        Dose:  550 mg   Take 1 tablet (550 mg) by mouth 2 times daily   Quantity:  28 tablet   Refills:  0         These medicines have changed or have updated prescriptions.        Dose/Directions    vitamin D3 2000 units tablet   Commonly known as:  CHOLECALCIFEROL   This may have changed:    - how much to take  - additional instructions   Used for:  Unspecified vitamin D deficiency        Dose:  1 tablet   Take 1 tablet by mouth daily Take one tablet daily.   Quantity:  90 tablet   Refills:  3            Where to get your medicines      These medications were sent to [facility] [address] [place] [facility] [address] [facility]  [address] [facility] [address]     Phone:  [phone]     rifaximin 550 MG Tabs tablet                Primary Care Provider Office Phone # Fax #    Natalie Desai [phone]       [address] [place] [address]        Equal Access to Services     RAKESH VAUGHN AH: Hadii bryce Guadarrama, celestino rodriguez, qareina nelson " heatl mcculloughmichael hoffmanaan ah. So [facility] [phone].    ATENCIÓN: Si habla toma, tiene a carranza disposición servicios gratuitos de asistencia lingüística. Vivian al [phone].    We comply with applicable federal civil rights laws and [place] laws. We do not discriminate on the basis of race, color, national origin, age, disability, sex, sexual orientation, or gender identity.            Thank you!     Thank you for choosing [facility]  for your care. Our goal is always to provide you with excellent care. Hearing back from our patients is one way we can continue to improve our services. Please take a few minutes to complete the written survey that you may receive in the mail after your visit with us. Thank you!             Your Updated Medication List - Protect others around you: Learn how to safely use, store and throw away your medicines at www.disposemymeds.org.          This list is accurate as of 11/27/18 11:59 PM.  Always use your most recent med list.                   Brand Name Dispense Instructions for use Diagnosis    Aluminum Chloride 12 % Soln     35.5 mL    Externally apply topically daily    Hidradenitis suppurativa       clindamycin 1 % external solution    CLEOCIN T    60 mL    Apply topically 2 times daily    Hidradenitis suppurativa       EFFEXOR PO      Take 150 mg by mouth daily Take 2 tablets        hydrOXYzine 25 MG tablet    ATARAX     Take 25 mg by mouth as needed for itching        LATUDA 20 MG Tabs tablet   Generic drug:  lurasidone      Take by mouth daily Take 1 and 1/2 tablets        nitroFURantoin macrocrystal-monohydrate 100 MG capsule    MACROBID    10 capsule    Take 1 capsule (100 mg) by mouth 2 times daily    Dysuria       OMEGA-3 FISH OIL PO      Take 2 g by mouth 2 times daily        * propranolol 80 MG 24 hr capsule    INDERAL LA     Take 80 mg by mouth At Bedtime        * propranolol 40 MG tablet    INDERAL     Take 40 mg by  mouth 3 times daily        rifaximin 550 MG Tabs tablet    XIFAXAN    28 tablet    Take 1 tablet (550 mg) by mouth 2 times daily    Small intestinal bacterial overgrowth       vitamin D3 2000 units tablet    CHOLECALCIFEROL    90 tablet    Take 1 tablet by mouth daily Take one tablet daily.    Unspecified vitamin D deficiency       * Notice:  This list has 2 medication(s) that are the same as other medications prescribed for you. Read the directions carefully, and ask your doctor or other care provider to review them with you.

## 2018-11-27 NOTE — LETTER
11/27/2018       RE: Cristine Caballero  [address]  [facility] [address]     Dear Colleague,    Thank you for referring your patient, Cristine Caballero, to the [facility] at [facility]. Please see a copy of my visit note below.    Cristine is a 28 year old female who presents with lingering symptoms after a UTI.   HPI:  Had a UTI 11.06.2018 and treated with ABX.  Returned to urgent care on 11.21.2018 [urine negative].  Continues to complain of back pain on the right, gas, bloating with extreme fatigue.  No energy and muscle aches in biceps. She has had SIBO in the past and reports these are typical symptoms. No fever or chills. No diarrhea.   Clear  [11.21.2018] Slightly Cloudy (A) [11/06.2018]   1.010 1.025   7.0 6.0   Normal Normal   Negative Negative   Negative Negative   Negative Trace (A)   Negative Negative   Negative Negative   Negative Negative   Negative Trace (A)     Moderate (A)     Moderate (A)     None Seen     6-10 (A)           Has taken rifaximin in the past and it has been helpful.  Would like to try this again     PROBLEM LIST:  Patient Active Problem List   Diagnosis     Generalized anxiety disorder     OCD (obsessive compulsive disorder)     Moderate recurrent major depression (H)     Environmental allergies     Irritable bowel syndrome     Small intestinal bacterial overgrowth   PAST MEDICAL HISTORY:  Past Medical History:   Diagnosis Date     Anxiety      Anxiety disorder      Depressive disorder      Environmental allergies      Hx of eating disorder      IBS (irritable bowel syndrome)      Mild major depression (H)     ANW     OCD (obsessive compulsive disorder)     stable with CBT      Suicide attempt by multiple drug overdose (H) 4.21.2016     Vitamin D deficiency    Life Style Modifiers:   Tobacco:  reports that she has never smoked. She has never used smokeless tobacco.   Alcohol:  reports that she drinks about 1.5 oz of  alcohol per week    Drug use:  reports that she does not use illicit drugs.  PAST SURGICAL HISTORY:  Past Surgical History:   Procedure Laterality Date     biopsy  8-    vulvar     BIOPSY      GI, vulva- OK     COLONOSCOPY  2011     DENTAL SURGERY  2011     ENT SURGERY  2014    intubation & NG tube     ESOPHAGOSCOPY, GASTROSCOPY, DUODENOSCOPY (EGD), COMBINED  10/11/2011    Procedure:COMBINED ESOPHAGOSCOPY, GASTROSCOPY, DUODENOSCOPY (EGD), BIOPSY SINGLE OR MULTIPLE; Surgeon:TALA MONTEMAYOR; Location:[facility]     HC BREATH HYDROGEN TEST  10/4/2011    Procedure:HYDROGEN BREATH TEST; Surgeon:RADHA EPSTEIN; Location:[facility]     LAPAROSCOPY       ORTHOPEDIC SURGERY      left wrist- screw in left scaphiod     TONSILLECTOMY       WRIST SURGERY  2004    screw placed L wrist after fracture   FAMILY HISTORY:  Family History   Problem Relation Age of Onset     Depression Mother      Allergies Mother      Asthma Mother      Thyroid Disease Mother      Eye Disorder Mother      Blood Disease Mother       essential thrombocytosis     Cancer Mother      AML, recent BMT     Anxiety Disorder Mother      Mental Illness Mother      Hoarding- OCD     Depression Sister      Anxiety Disorder Sister      Mental Illness Sister      OCD_ HOarding     Genitourinary Problems Sister      Asthma Sister      Eye Disorder Sister      Allergies Sister      Allergies Sister      Mental Illness Sister      ADHD     Depression Maternal Grandmother      HEART DISEASE Maternal Grandmother      Thyroid Disease Maternal Grandmother      Depression Other      aunt     Anxiety Disorder Other      Genitourinary Problems Other      aunt     Suicide Other      HEART DISEASE Maternal Grandfather      C.A.D. Maternal Grandfather      C.A.D. Paternal Grandfather      Cancer Paternal Grandfather      Eye Disorder Father      Mental Illness Father      OCPD?     Allergies Other      uncles   SOCIAL HISTORY:  Works at [employer]  Social History     Social History      "Marital status: Single     Spouse name: N/A     Number of children: N/A     Years of education: N/A   MEDICATIONS:  Current Outpatient Prescriptions   Medication Sig Dispense Refill     lurasidone (LATUDA) 20 MG TABS tablet Take by mouth daily Take 1 and 1/2 tablets       Aluminum Chloride 12 % SOLN Externally apply topically daily 35.5 mL 1     Cholecalciferol (VITAMIN D) 2000 UNITS tablet Take 1 tablet by mouth daily Take one tablet daily. (Patient taking differently: Take 4,000 Units by mouth daily ) 90 tablet 3     clindamycin (CLEOCIN T) 1 % solution Apply topically 2 times daily 60 mL 11     hydrOXYzine (ATARAX) 25 MG tablet Take 25 mg by mouth as needed for itching       nitroFURantoin, macrocrystal-monohydrate, (MACROBID) 100 MG capsule Take 1 capsule (100 mg) by mouth 2 times daily 10 capsule 0     Omega-3 Fatty Acids (OMEGA-3 FISH OIL PO) Take 2 g by mouth 2 times daily        propranolol (INDERAL LA) 80 MG 24 hr capsule Take 80 mg by mouth At Bedtime        propranolol (INDERAL) 40 MG tablet Take 40 mg by mouth 3 times daily       Venlafaxine HCl (EFFEXOR PO) Take 150 mg by mouth daily Take 2 tablets     ALLERGIES:  Codeine; Corn dextrin; Gluten meal; Penicillin g; and Sulfa drugs  VITALS:  Blood pressure 129/79, pulse 75, temperature 98.4  F (36.9  C), temperature source Oral, height 1.651 m (5' 5\"), weight 106.1 kg (233 lb 12.8 oz), not currently breastfeeding.  PHYSICAL EXAM:  Constitutional: Overweight woman in NAD Neck: No thyroidmegaly. No jugular venous distension, no carotid bruits.  Cardiovascular: regular rate and rhythm, normal S1 and S2, no murmurs, rubs or gallops, peripheral pulses full and symmetric   Respiratory: clear to auscultation, no wheezes or crackles, normal breath sounds.  GI: Large abdomen without masses or guarding.   Musculoskeletal: full range of motion    Skin: no concerning lesions, no jaundice.  Neurological: normal gait, no tremor.   Diagnoses and associated orders for " this visit:  Fatigue, unspecified type  -     T3 Free; Future  -     T4 free; Future  -     TSH; Future  -     Basic Metabolic Panel; Future  -     CBC with Platelets; Future    Small intestinal bacterial overgrowth, likely  -     rifaximin (XIFAXAN) 550 MG TABS tablet; Take 1 tablet (550 mg) by mouth 2 times daily. [she will check on insurance coverage.  -     Discussed reducing carb intake and managing weight.     Other orders  -     lurasidone (LATUDA) 20 MG TABS tablet; Take by mouth daily Take 1 and 1/2 tablets    Contraception discussion   -   Reviewed options and the IUD, mirena is likely the best to consider.  She will make an appointment for insertion.    Again, thank you for allowing me to participate in the care of your patient.      Sincerely,    Natalie Desai MD

## 2018-11-28 ASSESSMENT — ENCOUNTER SYMPTOMS
BOWEL INCONTINENCE: 0
LOSS OF CONSCIOUSNESS: 0
CHILLS: 0
NAIL CHANGES: 0
WEIGHT LOSS: 0
FATIGUE: 1
TREMORS: 1
DISTURBANCES IN COORDINATION: 0
SPEECH CHANGE: 0
POLYDIPSIA: 0
SKIN CHANGES: 0
ABDOMINAL PAIN: 0
DYSURIA: 0
NECK PAIN: 1
FEVER: 0
FLANK PAIN: 1
HOT FLASHES: 0
MUSCLE WEAKNESS: 0
TINGLING: 0
HEADACHES: 0
HALLUCINATIONS: 0
MEMORY LOSS: 0
DECREASED APPETITE: 0
WEAKNESS: 0
DIARRHEA: 0
DIFFICULTY URINATING: 0
DIZZINESS: 0
DECREASED LIBIDO: 0
NIGHT SWEATS: 0
SEIZURES: 0
HEARTBURN: 0
BLOATING: 1
JAUNDICE: 0
VOMITING: 0
CONSTIPATION: 0
ALTERED TEMPERATURE REGULATION: 0
RECTAL PAIN: 0
HEMATURIA: 0
BLOOD IN STOOL: 0
STIFFNESS: 0
INCREASED ENERGY: 1
NAUSEA: 0
MYALGIAS: 1
MUSCLE CRAMPS: 0
BACK PAIN: 1
ARTHRALGIAS: 0
POOR WOUND HEALING: 1
JOINT SWELLING: 0
WEIGHT GAIN: 1
POLYPHAGIA: 0
PARALYSIS: 0
NUMBNESS: 0

## 2018-11-28 ASSESSMENT — ANXIETY QUESTIONNAIRES
7. FEELING AFRAID AS IF SOMETHING AWFUL MIGHT HAPPEN: SEVERAL DAYS
6. BECOMING EASILY ANNOYED OR IRRITABLE: SEVERAL DAYS
4. TROUBLE RELAXING: NOT AT ALL
3. WORRYING TOO MUCH ABOUT DIFFERENT THINGS: NOT AT ALL
GAD7 TOTAL SCORE: 3
GAD7 TOTAL SCORE: 3
1. FEELING NERVOUS, ANXIOUS, OR ON EDGE: SEVERAL DAYS
2. NOT BEING ABLE TO STOP OR CONTROL WORRYING: NOT AT ALL
7. FEELING AFRAID AS IF SOMETHING AWFUL MIGHT HAPPEN: SEVERAL DAYS
5. BEING SO RESTLESS THAT IT IS HARD TO SIT STILL: NOT AT ALL

## 2018-11-29 ASSESSMENT — ANXIETY QUESTIONNAIRES: GAD7 TOTAL SCORE: 3

## 2018-12-07 ENCOUNTER — OFFICE VISIT (OUTPATIENT)
Dept: OBGYN | Facility: CLINIC | Age: 28
End: 2018-12-07
Attending: NURSE PRACTITIONER
Payer: COMMERCIAL

## 2018-12-07 VITALS
HEART RATE: 70 BPM | WEIGHT: 235.6 LBS | SYSTOLIC BLOOD PRESSURE: 125 MMHG | DIASTOLIC BLOOD PRESSURE: 82 MMHG | BODY MASS INDEX: 39.21 KG/M2

## 2018-12-07 DIAGNOSIS — Z30.430 ENCOUNTER FOR INSERTION OF INTRAUTERINE CONTRACEPTIVE DEVICE: ICD-10-CM

## 2018-12-07 DIAGNOSIS — Z30.430 ENCOUNTER FOR IUD INSERTION: Primary | ICD-10-CM

## 2018-12-07 DIAGNOSIS — Z11.3 SCREEN FOR STD (SEXUALLY TRANSMITTED DISEASE): ICD-10-CM

## 2018-12-07 LAB
HCG UR QL: NEGATIVE
INTERNAL QC OK POCT: YES

## 2018-12-07 PROCEDURE — 58300 INSERT INTRAUTERINE DEVICE: CPT | Mod: ZF | Performed by: NURSE PRACTITIONER

## 2018-12-07 PROCEDURE — 87591 N.GONORRHOEAE DNA AMP PROB: CPT | Performed by: NURSE PRACTITIONER

## 2018-12-07 PROCEDURE — 87491 CHLMYD TRACH DNA AMP PROBE: CPT | Performed by: NURSE PRACTITIONER

## 2018-12-07 PROCEDURE — 81025 URINE PREGNANCY TEST: CPT | Mod: ZF | Performed by: NURSE PRACTITIONER

## 2018-12-07 PROCEDURE — 25000128 H RX IP 250 OP 636: Mod: ZF

## 2018-12-07 PROCEDURE — G0463 HOSPITAL OUTPT CLINIC VISIT: HCPCS | Mod: ZF

## 2018-12-07 ASSESSMENT — PAIN SCALES - GENERAL
PAINLEVEL: NO PAIN (0)
PAINLEVEL: NO PAIN (0)

## 2018-12-07 NOTE — MR AVS SNAPSHOT
After Visit Summary   12/7/2018    Cristine Caballero    MRN: [id number]           Patient Information     Date Of Birth          1990        Visit Information        Provider Department      12/7/2018 11:00 AM Adrianne Mims APRN [facility]        Today's Diagnoses     Encounter for IUD insertion    -  1    Encounter for insertion of intrauterine contraceptive device          Care Instructions    Kyleena IUD was placed today.  This is due to be removed/ replaced by 12/7/2023  MUST USE BACK-UP METHOD (condoms) for the NEXT 7 DAYS as the Mirena IUD takes 7 days to be effective.  Return to clinic in 4-6 weeks for IUD string check.  May continue taking ibuprofen 600mg every 6 hours as needed for cramping/ discomfort.    IUD information:     Benefits: The IUD can be 97-99% effective when carefully following directions regarding use. It can be more effective if used with additional contraception. IUD containing progestin may decrease menstrual flow and menstrual cramping.     Risks/Side Effects: include but are not limited to spotting, bleeding, hemorrhage, or anemia: cramping or pain: partial or complete expulsion of device; lost IUD strings; uterine or cervical perforation; embedding of IUD in the uterine wall; increased risk of pelvic inflammatory disease. Women who become pregnant with an IUD in place are at a higher risk for ectopic pregnancy should a pregnancy occur with an IUD in situ. There is a higher rate of miscarriage when pregnancy occurs with IUD in place.    Warning signs: Please call clinic if you have abnormal spotting or heavy bleeding, abdominal pain, dyspareunia, fever, chills, flu like symptoms, or unable to locate strings of IUD, or strings are longer or shorter than expected.    You are encouraged to use condoms for prevention of STD. You may also need back up contraception for 7 days with your IUD. You may use pain medications (ibuprofen) as  needed for mild to moderate pain. Please follow-up in clinic in 4-6 weeks for IUD string check if unable to find strings or as directed by provider.            Follow-ups after your visit        Your next 10 appointments already scheduled     Dec 18, 2018  9:40 AM CST   Annual Visit with Natalie Desai MD   [facility] ([facility])    [facility]  [address]  [address]  [address] [address]   [phone]              Who to contact     Please call your clinic at [phone] to:    Ask questions about your health    Make or cancel appointments    Discuss your medicines    Learn about your test results    Speak to your doctor            Additional Information About Your Visit        [employer] Information     [employer] gives you secure access to your electronic health record. If you see a primary care provider, you can also send messages to your care team and make appointments. If you have questions, please call your primary care clinic.  If you do not have a primary care provider, please call [phone] and they will assist you.      [employer] is an electronic gateway that provides easy, online access to your medical records. With [employer], you can request a clinic appointment, read your test results, renew a prescription or communicate with your care team.     To access your existing account, please contact your [facility] or call [phone] for assistance.        Care EveryWhere ID     This is your Care EveryWhere ID. This could be used by other organizations to access your [place] medical records  [id number]        Your Vitals Were     Pulse Last Period BMI (Body Mass Index)             70 11/23/2018 (Approximate) 39.21 kg/m2          Blood Pressure from Last 3 Encounters:   12/07/18 125/82   11/27/18 129/79   08/16/18 107/75    Weight from Last 3 Encounters:   12/07/18 106.9 kg (235 lb 9.6 oz)   11/27/18 106.1 kg (233  lb 12.8 oz)   08/16/18 100.7 kg (222 lb)              We Performed the Following     C KYLEENA IUD 19.5 MG     hCG qual urine POCT     INSERTION INTRAUTERINE DEVICE          Today's Medication Changes          These changes are accurate as of 12/7/18 11:52 AM.  If you have any questions, ask your nurse or doctor.               Start taking these medicines.        Dose/Directions    levonorgestrel 19.5 MG IUD   Commonly known as:  KYLEENA   Used for:  Encounter for IUD insertion, Encounter for insertion of intrauterine contraceptive device   Started by:  Adrianne Mims APRN CNP        Dose:  1 each   1 each (19.5 mg) by Intrauterine route once for 1 dose   Quantity:  1 each   Refills:  0         These medicines have changed or have updated prescriptions.        Dose/Directions    vitamin D3 2000 units tablet   Commonly known as:  CHOLECALCIFEROL   This may have changed:    - how much to take  - additional instructions   Used for:  Unspecified vitamin D deficiency        Dose:  1 tablet   Take 1 tablet by mouth daily Take one tablet daily.   Quantity:  90 tablet   Refills:  3            Where to get your medicines      Some of these will need a paper prescription and others can be bought over the counter.  Ask your nurse if you have questions.     You don't need a prescription for these medications     levonorgestrel 19.5 MG IUD                Primary Care Provider Office Phone # Fax #    Natalie Desai [phone]       [address] [address]        Equal Access to Services     RAKESH VAUGHN AH: Hadii bryce joya Solew, waaxda luqadaha, qaybta kaalmada adeegyada, hetal sparrow . So [facility] [phone].    ATENCIÓN: Si habla español, tiene a carranza disposición servicios gratuitos de asistencia lingüística. Llame al [phone].    We comply with applicable federal civil rights laws and [place] laws. We do not discriminate on the basis of  race, color, national origin, age, disability, sex, sexual orientation, or gender identity.            Thank you!     Thank you for choosing [facility]  for your care. Our goal is always to provide you with excellent care. Hearing back from our patients is one way we can continue to improve our services. Please take a few minutes to complete the written survey that you may receive in the mail after your visit with us. Thank you!             Your Updated Medication List - Protect others around you: Learn how to safely use, store and throw away your medicines at www.disposemymeds.org.          This list is accurate as of 12/7/18 11:52 AM.  Always use your most recent med list.                   Brand Name Dispense Instructions for use Diagnosis    Aluminum Chloride 12 % Soln     35.5 mL    Externally apply topically daily    Hidradenitis suppurativa       clindamycin 1 % external solution    CLEOCIN T    60 mL    Apply topically 2 times daily    Hidradenitis suppurativa       EFFEXOR PO      Take 150 mg by mouth daily Take 2 tablets        hydrOXYzine 25 MG tablet    ATARAX     Take 25 mg by mouth as needed for itching        LATUDA 20 MG Tabs tablet   Generic drug:  lurasidone      Take by mouth daily Take 1 and 1/2 tablets        levonorgestrel 19.5 MG IUD    KYLEENA    1 each    1 each (19.5 mg) by Intrauterine route once for 1 dose    Encounter for IUD insertion, Encounter for insertion of intrauterine contraceptive device       nitroFURantoin macrocrystal-monohydrate 100 MG capsule    MACROBID    10 capsule    Take 1 capsule (100 mg) by mouth 2 times daily    Dysuria       OMEGA-3 FISH OIL PO      Take 2 g by mouth 2 times daily        * propranolol ER 80 MG 24 hr capsule    INDERAL LA     Take 80 mg by mouth At Bedtime        * propranolol 40 MG tablet    INDERAL     Take 40 mg by mouth 3 times daily        rifaximin 550 MG Tabs tablet    XIFAXAN    28 tablet    Take 1 tablet (550 mg) by  mouth 2 times daily    Small intestinal bacterial overgrowth       vitamin D3 2000 units tablet    CHOLECALCIFEROL    90 tablet    Take 1 tablet by mouth daily Take one tablet daily.    Unspecified vitamin D deficiency       * Notice:  This list has 2 medication(s) that are the same as other medications prescribed for you. Read the directions carefully, and ask your doctor or other care provider to review them with you.

## 2018-12-07 NOTE — PATIENT INSTRUCTIONS
Kyleena IUD was placed today.  This is due to be removed/ replaced by 12/7/2023  MUST USE BACK-UP METHOD (condoms) for the NEXT 7 DAYS as the Mirena IUD takes 7 days to be effective.  Return to clinic in 4-6 weeks for IUD string check.  May continue taking ibuprofen 600mg every 6 hours as needed for cramping/ discomfort.    IUD information:     Benefits: The IUD can be 97-99% effective when carefully following directions regarding use. It can be more effective if used with additional contraception. IUD containing progestin may decrease menstrual flow and menstrual cramping.     Risks/Side Effects: include but are not limited to spotting, bleeding, hemorrhage, or anemia: cramping or pain: partial or complete expulsion of device; lost IUD strings; uterine or cervical perforation; embedding of IUD in the uterine wall; increased risk of pelvic inflammatory disease. Women who become pregnant with an IUD in place are at a higher risk for ectopic pregnancy should a pregnancy occur with an IUD in situ. There is a higher rate of miscarriage when pregnancy occurs with IUD in place.    Warning signs: Please call clinic if you have abnormal spotting or heavy bleeding, abdominal pain, dyspareunia, fever, chills, flu like symptoms, or unable to locate strings of IUD, or strings are longer or shorter than expected.    You are encouraged to use condoms for prevention of STD. You may also need back up contraception for 7 days with your IUD. You may use pain medications (ibuprofen) as needed for mild to moderate pain. Please follow-up in clinic in 4-6 weeks for IUD string check if unable to find strings or as directed by provider.

## 2018-12-07 NOTE — PROGRESS NOTES
"IUD Insertion:  CONSULT:    Is a pregnancy test required: Yes.  Was it positive or negative?  Negative  Was a consent obtained?  Yes    Subjective: Cristine (\"Ali\") EDIS Caballero is a 28 year old, , who presents for IUD insertion.     Reviewed IUD options with Ivonne.  Her priorities in a birth control method are that it does not worsen her depression or anxiety, that it improves her heavy menses, and does not cause weight gain.  Ivonne decided on the Kyleena IUD.    Menses: Patient's last menstrual period was 2018 (approximate).  Regular, monthly with 4-5 days of bleeding; first day is heaviest, occasionally changing a super tampon every hour.  Denies experiencing dysmenorrhea.      Patient has been given the opportunity to ask questions about all forms of birth control, including all options appropriate for Cristine Caballero. Discussed that no method of birth control, except abstinence is 100% effective against pregnancy or sexually transmitted infection.     Cristine Caballero understands she may have the IUD removed at any time. IUD should be removed by a health care provider.    The entire insertion procedure was reviewed with the patient, including care after placement.    Patient's last menstrual period was 2018 (approximate). Ivonne has not had any unprotected intercourse in the last 2 weeks; she is currently using condoms for contraception. No allergy to betadine or shellfish. Patient desires STD screening  HCG Qual Urine   Date Value Ref Range Status   2018 Negative neg Final     /82 (BP Location: Left arm, Patient Position: Sitting, Cuff Size: Adult Large)  Pulse 70  Wt 106.9 kg (235 lb 9.6 oz)  LMP 2018 (Approximate)  BMI 39.21 kg/m2    Pelvic Exam:   EG/BUS: normal genital architecture without lesions, erythema or abnormal secretions.   Vagina: moist, pink, rugae with physiologic discharge and secretions  Cervix: nulliparous, no lesions and pink, moist, closed, without lesion or " CMT  Uterus: anteverted position, mobile, no pain  Adnexa: within normal limits and no masses, nodularity, tenderness    PROCEDURE NOTE: -- IUD Insertion    Reason for Insertion: contraception    Premedicated with ibuprofen.  Under sterile technique, cervix was visualized with graves speculum and prepped with Betadine solution swab x 3. Tenaculum was placed for stability. The uterus was gently straightened and sounded to 7.0 cm. IUD prepared for placement, and IUD inserted according to 's instructions without difficulty or significant resitance, and deployed at the fundus. The strings were visualized and trimmed to 4.0 cm from the external os. Tenaculum was removed and hemostasis noted. Speculum removed.  Patient tolerated procedure well.    Lot # [id number]  Exp: Jan 2021    EBL: minimal    Complications: none    ASSESSMENT:     ICD-10-CM    1. Encounter for IUD insertion Z30.430 hCG qual urine POCT     C KYLEENA IUD 19.5 MG     levonorgestrel (KYLEENA) 19.5 MG IUD     INSERTION INTRAUTERINE DEVICE   2. Encounter for insertion of intrauterine contraceptive device Z30.430 C KYLEENA IUD 19.5 MG     levonorgestrel (KYLEENA) 19.5 MG IUD     INSERTION INTRAUTERINE DEVICE        PLAN:    Given 's handouts, including when to have IUD removed, list of danger s/sx, side effects and follow up recommended. Encouraged condom use for prevention of STD. Back up contraception advised for 7 days if progestin method. Advised to call for any fever, for prolonged or severe pain or bleeding, abnormal vaginal discharge, or unable to palpate strings. She was advised to use pain medications (ibuprofen) as needed for mild to moderate pain. Advised to follow-up in clinic in 4-6 weeks for IUD string check.      LAURIE Wren CNP

## 2018-12-07 NOTE — NURSING NOTE
Chief Complaint   Patient presents with     IUD     IUD placement     Health Maintenance Due   Topic Date Due     DEPRESSION ACTION PLAN  04/18/2007     HIV SCREEN (SYSTEM ASSIGNED)  04/18/2007     PHQ-9 Q1 MONTH  08/06/2018     INFLUENZA VACCINE (1) 09/01/2018     PHQ-9 Q3 MONTHS  10/06/2018     Marilin Acuna CMA on 12/7/2018 at 11:07 AM

## 2018-12-07 NOTE — LETTER
"2018       RE: Cristine Caballero  [address]  [facility] [address]     Dear Colleague,    Thank you for referring your patient, Cristine Caballero, to the [facility] at [facility]. Please see a copy of my visit note below.    IUD Insertion:  CONSULT:    Is a pregnancy test required: Yes.  Was it positive or negative?  Negative  Was a consent obtained?  Yes    Subjective: Cristine (\"Ali\") EDIS Caballero is a 28 year old, , who presents for IUD insertion.     Reviewed IUD options with Ivonne.  Her priorities in a birth control method are that it does not worsen her depression or anxiety, that it improves her heavy menses, and does not cause weight gain.  Ivonne decided on the Kyleena IUD.    Menses: Patient's last menstrual period was 2018 (approximate).  Regular, monthly with 4-5 days of bleeding; first day is heaviest, occasionally changing a super tampon every hour.  Denies experiencing dysmenorrhea.      Patient has been given the opportunity to ask questions about all forms of birth control, including all options appropriate for Cristine Caballero. Discussed that no method of birth control, except abstinence is 100% effective against pregnancy or sexually transmitted infection.     Cristine Caballero understands she may have the IUD removed at any time. IUD should be removed by a health care provider.    The entire insertion procedure was reviewed with the patient, including care after placement.    Patient's last menstrual period was 2018 (approximate). Ivonne has not had any unprotected intercourse in the last 2 weeks; she is currently using condoms for contraception. No allergy to betadine or shellfish. Patient desires STD screening  HCG Qual Urine   Date Value Ref Range Status   2018 Negative neg Final     /82 (BP Location: Left arm, Patient Position: Sitting, Cuff Size: Adult Large)  Pulse 70  Wt 106.9 kg (235 lb 9.6 oz)  LMP " 11/23/2018 (Approximate)  BMI 39.21 kg/m2    Pelvic Exam:   EG/BUS: normal genital architecture without lesions, erythema or abnormal secretions.   Vagina: moist, pink, rugae with physiologic discharge and secretions  Cervix: nulliparous, no lesions and pink, moist, closed, without lesion or CMT  Uterus: anteverted position, mobile, no pain  Adnexa: within normal limits and no masses, nodularity, tenderness    PROCEDURE NOTE: -- IUD Insertion    Reason for Insertion: contraception    Premedicated with ibuprofen.  Under sterile technique, cervix was visualized with graves speculum and prepped with Betadine solution swab x 3. Tenaculum was placed for stability. The uterus was gently straightened and sounded to 7.0 cm. IUD prepared for placement, and IUD inserted according to 's instructions without difficulty or significant resitance, and deployed at the fundus. The strings were visualized and trimmed to 4.0 cm from the external os. Tenaculum was removed and hemostasis noted. Speculum removed.  Patient tolerated procedure well.    Lot # [id number]  Exp: Jan 2021    EBL: minimal    Complications: none    ASSESSMENT:     ICD-10-CM    1. Encounter for IUD insertion Z30.430 hCG qual urine POCT     C KYLEENA IUD 19.5 MG     levonorgestrel (KYLEENA) 19.5 MG IUD     INSERTION INTRAUTERINE DEVICE   2. Encounter for insertion of intrauterine contraceptive device Z30.430 C KYLEENA IUD 19.5 MG     levonorgestrel (KYLEENA) 19.5 MG IUD     INSERTION INTRAUTERINE DEVICE        PLAN:    Given 's handouts, including when to have IUD removed, list of danger s/sx, side effects and follow up recommended. Encouraged condom use for prevention of STD. Back up contraception advised for 7 days if progestin method. Advised to call for any fever, for prolonged or severe pain or bleeding, abnormal vaginal discharge, or unable to palpate strings. She was advised to use pain medications (ibuprofen) as needed for mild to  moderate pain. Advised to follow-up in clinic in 4-6 weeks for IUD string check.        Again, thank you for allowing me to participate in the care of your patient.      Sincerely,    LAURIE Wren CNP

## 2018-12-09 LAB
C TRACH DNA SPEC QL NAA+PROBE: NEGATIVE
N GONORRHOEA DNA SPEC QL NAA+PROBE: NEGATIVE
SPECIMEN SOURCE: NORMAL
SPECIMEN SOURCE: NORMAL

## 2018-12-21 ENCOUNTER — TELEPHONE (OUTPATIENT)
Dept: OBGYN | Facility: CLINIC | Age: 28
End: 2018-12-21

## 2018-12-21 NOTE — TELEPHONE ENCOUNTER
Spoke with Dr. Eric Fabian who advises can treat with abx drops if pt has had contact with known conjunctivitis. If not pt will need to be seen.   Called patient who reports she has not had contact with a known case of pink eye. Discussed possible viral etiology of symptoms-- pt endorses she has mild cold symptoms currently.  Advised patient to present to urgent care or [facility] to be evaluated for this concern.   Patient expressed understanding and agrees with plan.

## 2018-12-21 NOTE — TELEPHONE ENCOUNTER
----- Message from Martha Boykin sent at 12/21/2018 10:23 AM CST -----  Regarding: Poss Pink Eye Red/goop/sore/raw  Contact: [phone]  As above, possible pink eye - can an RX just get called in or does she ntbs?

## 2019-01-21 ENCOUNTER — ALLIED HEALTH/NURSE VISIT (OUTPATIENT)
Dept: SURGERY | Facility: CLINIC | Age: 29
End: 2019-01-21
Payer: MEDICARE

## 2019-01-21 ENCOUNTER — OFFICE VISIT (OUTPATIENT)
Dept: ENDOCRINOLOGY | Facility: CLINIC | Age: 29
End: 2019-01-21
Payer: MEDICARE

## 2019-01-21 VITALS
RESPIRATION RATE: 18 BRPM | BODY MASS INDEX: 39.52 KG/M2 | OXYGEN SATURATION: 97 % | HEIGHT: 65 IN | TEMPERATURE: 99 F | WEIGHT: 237.2 LBS | HEART RATE: 76 BPM | DIASTOLIC BLOOD PRESSURE: 72 MMHG | SYSTOLIC BLOOD PRESSURE: 121 MMHG

## 2019-01-21 DIAGNOSIS — E66.01 MORBID OBESITY (H): Primary | ICD-10-CM

## 2019-01-21 RX ORDER — HYDROXYZINE PAMOATE 25 MG/1
75 CAPSULE ORAL
COMMUNITY
Start: 2019-01-09 | End: 2019-10-26

## 2019-01-21 ASSESSMENT — PAIN SCALES - GENERAL: PAINLEVEL: NO PAIN (0)

## 2019-01-21 ASSESSMENT — MIFFLIN-ST. JEOR: SCORE: 1801.81
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
## 2019-01-21 NOTE — PATIENT INSTRUCTIONS
Nutrition Goals  1) Pt to follow volumetric diet  2) limit snacking - 100 calorie snack list, choose protein   3) Limit/avoid calorie containing beverages  4) Exercise 4-5 times per     Follow up with RD in two months    Pat Adams RD, LD  If you need to schedule or reschedule with a dietitian please call [phone].

## 2019-01-21 NOTE — PATIENT INSTRUCTIONS
Welcome to our weight management program!   We are excited to join you on your weight loss journey    Thank you for allowing us the privilege of caring for you. We hope we provided you with the excellent service you deserve.    Please let us know if there is anything else we can do so that we can be sure you are leaving completely satisfied with your care experience.    You saw Dr. Strickland today.    Instructions per today's visit:   Dietitian visit today    Follow up appointments:  Follow up in 3-4 months with Dr. Strickland    To reach GEORGIANA Ellis for any questions/concerns call [phone]    To schedule appointments with our team, please call [phone]     Please call during clinic hours Monday through Friday 8:00a - 4:00p if you have questions or you can contact us via [employer] at anytime. ?    Lab results will be communicated through My Chart or letter (if My Chart not used). Please call the clinic if you have not received communication after 1 week or if you have any questions.?      Fax: [phone]    Thank you,  Medical Weight Management Team

## 2019-01-21 NOTE — NURSING NOTE
"Chief Complaint   Patient presents with     Weight Problem     PT here for weight management consult       Vitals:    01/21/19 0835   BP: 121/72   BP Location: Left arm   Patient Position: Sitting   Cuff Size: Adult Large   Pulse: 76   Resp: 18   Temp: 99  F (37.2  C)   TempSrc: Oral   SpO2: 97%   Weight: 107.6 kg (237 lb 3.2 oz)   Height: 1.651 m (5' 5\")       Body mass index is 39.47 kg/m .      KAIDEN Welsh, EMT                      "
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
## 2019-02-04 ENCOUNTER — TELEPHONE (OUTPATIENT)
Dept: OBGYN | Facility: CLINIC | Age: 29
End: 2019-02-04

## 2019-02-04 NOTE — TELEPHONE ENCOUNTER
"Received voicemail from patient who states for past 3 days, she has been feeling unwell. Notes nausea (no vomiting), flushed, fatigue, chills, and muscle aches.    States no fever. She states on voicemail there is concern for \"potential HIV exposure\" and wants to discuss.     Returned call and left voicemail at 1400.   "

## 2019-02-04 NOTE — TELEPHONE ENCOUNTER
Patient called to discuss symptoms of current fatigue, muscle aches, nausea, headache. Patient denies upper respiratory symptoms. Denies fever.    [facility] and has concerns related to possible HIV infection. Pt plans to return [facility] to have repeat HIV lab drawn in two months.    Pt also states she took pregnancy test [facility] 4 days after intercourse which was negative. Nurse advised repeat test as these symptoms can be early signs of pregnancy. Advised pt to monitor symptoms at home and if develops fever which lasts over 24 hours, or if symptoms worsen can present to urgent care for further assessment. Advised to rest, hydrate.     Patient expressed understanding and agrees with plan. Pt has no further questions at this time.

## 2019-02-18 ENCOUNTER — TELEPHONE (OUTPATIENT)
Dept: OBGYN | Facility: CLINIC | Age: 29
End: 2019-02-18

## 2019-02-18 ENCOUNTER — TRANSFERRED RECORDS (OUTPATIENT)
Dept: HEALTH INFORMATION MANAGEMENT | Facility: CLINIC | Age: 29
End: 2019-02-18

## 2019-02-18 NOTE — TELEPHONE ENCOUNTER
----- Message from Kinsey Roe RN sent at 2/15/2019  4:46 PM CST -----  Regarding: monolecleuosis symptoms  Left message stating she was diagnosed with mono and is feeling tired. Call back.    -Kinsey

## 2019-04-01 ENCOUNTER — TELEPHONE (OUTPATIENT)
Dept: OBGYN | Facility: CLINIC | Age: 29
End: 2019-04-01

## 2019-04-01 ENCOUNTER — NURSE TRIAGE (OUTPATIENT)
Dept: NURSING | Facility: CLINIC | Age: 29
End: 2019-04-01

## 2019-04-01 ENCOUNTER — HOSPITAL ENCOUNTER (EMERGENCY)
Facility: CLINIC | Age: 29
Discharge: HOME | End: 2019-04-02
Attending: EMERGENCY MEDICINE | Admitting: EMERGENCY MEDICINE
Payer: MEDICARE

## 2019-04-01 DIAGNOSIS — R00.2 PALPITATIONS: ICD-10-CM

## 2019-04-01 PROCEDURE — 93005 ELECTROCARDIOGRAM TRACING: CPT | Performed by: EMERGENCY MEDICINE

## 2019-04-01 PROCEDURE — 99285 EMERGENCY DEPT VISIT HI MDM: CPT | Mod: 25 | Performed by: EMERGENCY MEDICINE

## 2019-04-01 PROCEDURE — 93010 ELECTROCARDIOGRAM REPORT: CPT | Mod: Z6 | Performed by: EMERGENCY MEDICINE

## 2019-04-01 ASSESSMENT — MIFFLIN-ST. JEOR: SCORE: 1800.9

## 2019-04-01 NOTE — TELEPHONE ENCOUNTER
Pt has annual exam tomorrow and wondering if she should be fasting. Recommended that she should as she would like labs done. Agrees to plan. Gisselle Quiñones, LAURIE MUÑOZM

## 2019-04-01 NOTE — ED AVS SNAPSHOT
[facility], [place], Emergency Department  [address] [place] [id number]  Phone:  [phone]                                    Cristine Caballero   MRN: [id number]    Department:  [facility], Emergency Department   Date of Visit:  4/1/2019           After Visit Summary Signature Page    I have received my discharge instructions, and my questions have been answered. I have discussed any challenges I see with this plan with the nurse or doctor.    ..........................................................................................................................................  Patient/Patient Representative Signature      ..........................................................................................................................................  Patient Representative Print Name and Relationship to Patient    ..................................................               ................................................  Date                                   Time    ..........................................................................................................................................  Reviewed by Signature/Title    ...................................................              ..............................................  Date                                               Time          22EPIC Rev 08/18

## 2019-04-01 NOTE — TELEPHONE ENCOUNTER
Received call from Cristine who states recently she has noted a variety of symptoms of which she has become increasingly concerned including fatigue, increased third, and frequent urination. She is a patient of Dr. Desai who was not aware of her leave. Patient states she is due for an annual exam and would like to discuss her concerns ASAP.     Offered patient appoint in clinic and she will see Shanice Garcia 4/2, as she wishes for an integrative approach. Answered all questions.

## 2019-04-02 ENCOUNTER — APPOINTMENT (OUTPATIENT)
Dept: GENERAL RADIOLOGY | Facility: CLINIC | Age: 29
End: 2019-04-02
Attending: EMERGENCY MEDICINE
Payer: MEDICARE

## 2019-04-02 ENCOUNTER — TELEPHONE (OUTPATIENT)
Dept: OBGYN | Facility: CLINIC | Age: 29
End: 2019-04-02

## 2019-04-02 VITALS
HEIGHT: 65 IN | HEART RATE: 84 BPM | RESPIRATION RATE: 16 BRPM | DIASTOLIC BLOOD PRESSURE: 99 MMHG | BODY MASS INDEX: 39.49 KG/M2 | OXYGEN SATURATION: 98 % | WEIGHT: 237 LBS | TEMPERATURE: 98.6 F | SYSTOLIC BLOOD PRESSURE: 129 MMHG

## 2019-04-02 LAB
ALBUMIN UR-MCNC: 30 MG/DL
APPEARANCE UR: ABNORMAL
BACTERIA #/AREA URNS HPF: ABNORMAL /HPF
BILIRUB UR QL STRIP: NEGATIVE
COLOR UR AUTO: ABNORMAL
GLUCOSE UR STRIP-MCNC: NEGATIVE MG/DL
HGB UR QL STRIP: ABNORMAL
INTERPRETATION ECG - MUSE: NORMAL
KETONES UR STRIP-MCNC: NEGATIVE MG/DL
LEUKOCYTE ESTERASE UR QL STRIP: ABNORMAL
MUCOUS THREADS #/AREA URNS LPF: PRESENT /LPF
NITRATE UR QL: NEGATIVE
PH UR STRIP: 5.5 PH (ref 5–7)
RBC #/AREA URNS AUTO: >182 /HPF (ref 0–2)
SOURCE: ABNORMAL
SP GR UR STRIP: 1.01 (ref 1–1.03)
SPECIMEN SOURCE: NORMAL
SQUAMOUS #/AREA URNS AUTO: 10 /HPF (ref 0–1)
UROBILINOGEN UR STRIP-MCNC: NORMAL MG/DL (ref 0–2)
WBC #/AREA URNS AUTO: 19 /HPF (ref 0–5)
WET PREP SPEC: NORMAL
WET PREP SPEC: NORMAL

## 2019-04-02 PROCEDURE — 71046 X-RAY EXAM CHEST 2 VIEWS: CPT

## 2019-04-02 PROCEDURE — 81001 URINALYSIS AUTO W/SCOPE: CPT | Performed by: EMERGENCY MEDICINE

## 2019-04-02 PROCEDURE — 87086 URINE CULTURE/COLONY COUNT: CPT | Performed by: EMERGENCY MEDICINE

## 2019-04-02 PROCEDURE — 87210 SMEAR WET MOUNT SALINE/INK: CPT | Performed by: EMERGENCY MEDICINE

## 2019-04-02 PROCEDURE — 87088 URINE BACTERIA CULTURE: CPT | Performed by: EMERGENCY MEDICINE

## 2019-04-02 NOTE — TELEPHONE ENCOUNTER
Pt reports multiple symptoms including SOB, chest tightness, increased HR at 95bpm, dizziness, throat pain, vaginal symptoms.  She also states her worry she may have diabetes.    Disposition:  ED.  She verbalized understanding and had no further questions.     Gabriella Garcia, RN/ALICIA    Reason for Disposition    Difficulty breathing    Additional Information    Negative: Passed out (i.e., lost consciousness, collapsed and was not responding)    Negative: Shock suspected (e.g., cold/pale/clammy skin, too weak to stand, low BP, rapid pulse)    Negative: Difficult to awaken or acting confused  (e.g., disoriented, slurred speech)    Negative: Visible sweat on face or sweat dripping down face    Negative: Unable to walk, or can only walk with assistance (e.g., requires support)    Negative: [1] Received SHOCK from implantable cardiac defibrillator AND [2] persisting symptoms (i.e., palpitations, lightheadedness)    Negative: Sounds like a life-threatening emergency to the triager    Negative: Chest pain    Protocols used: HEART RATE AND HEARTBEAT QUESTIONS-ADULT-

## 2019-04-02 NOTE — ED PROVIDER NOTES
History     Chief Complaint   Patient presents with     Shortness of Breath     HPI  Cristine Caballero is a 28 year old female who presents with the following concerns.  She states she has been having some episodic shortness of breath and palpitations for several weeks.  The symptoms occur randomly and are not related to exertion.  She has no history of PE or heart disease.  She reports significant stressors related to a loss of job and other stressors but does not feel she is experiencing anxiety.  She also reports some bloody vaginal discharge.  She has an IUD and is not concerned for pregnancy.  She believes she has BV.   She has no vomiting or diarrhea.  No fever.  No recent changes to her medications.     PAST MEDICAL HISTORY:   Past Medical History:   Diagnosis Date     Anxiety      Anxiety disorder      Depressive disorder      Environmental allergies      Hx of eating disorder      IBS (irritable bowel syndrome)      Mild major depression (H)     ANW     OCD (obsessive compulsive disorder)     stable with CBT      Suicide attempt by multiple drug overdose (H) 4.21.2016     Vitamin D deficiency        PAST SURGICAL HISTORY:   Past Surgical History:   Procedure Laterality Date     ABDOMEN SURGERY  01/2012    Exploratory Laparoscopy     biopsy  8-    vulvar     BIOPSY      GI, vulva- OK     COLONOSCOPY  2011     DENTAL SURGERY  2011     ENT SURGERY  2014    intubation & NG tube     ESOPHAGOSCOPY, GASTROSCOPY, DUODENOSCOPY (EGD), COMBINED  10/11/2011    Procedure:COMBINED ESOPHAGOSCOPY, GASTROSCOPY, DUODENOSCOPY (EGD), BIOPSY SINGLE OR MULTIPLE; Surgeon:TALA MONTEMAYOR; Location:[facility]     HC BREATH HYDROGEN TEST  10/4/2011    Procedure:HYDROGEN BREATH TEST; Surgeon:RADHA EPSTEIN; Location:[facility]     LAPAROSCOPY       ORTHOPEDIC SURGERY      left wrist- screw in left scaphiod     TONSILLECTOMY       WRIST SURGERY  2004    screw placed L wrist after fracture       FAMILY HISTORY:   Family History    Problem Relation Age of Onset     Depression Mother      Allergies Mother      Asthma Mother      Thyroid Disease Mother      Eye Disorder Mother      Blood Disease Mother          essential thrombocytosis     Cancer Mother         AML, recent BMT     Anxiety Disorder Mother      Mental Illness Mother         Hoarding- OCD     Depression Sister      Anxiety Disorder Sister      Mental Illness Sister         OCD_ HOarding     Genitourinary Problems Sister      Asthma Sister      Eye Disorder Sister      Allergies Sister      Allergies Sister      Mental Illness Sister         ADHD     Depression Maternal Grandmother      Heart Disease Maternal Grandmother      Thyroid Disease Maternal Grandmother      Depression Other         aunt     Anxiety Disorder Other      Genitourinary Problems Other         aunt     Suicide Other      Heart Disease Maternal Grandfather      C.A.D. Maternal Grandfather      C.A.D. Paternal Grandfather      Cancer Paternal Grandfather      Eye Disorder Father      Mental Illness Father         OCPD?     Allergies Other         uncles       SOCIAL HISTORY:   Social History     Tobacco Use     Smoking status: Never Smoker     Smokeless tobacco: Never Used   Substance Use Topics     Alcohol use: Yes     Alcohol/week: 1.5 oz     Comment: 1-2 EtOH drinks 2 days per week       Patient's Medications   New Prescriptions    No medications on file   Previous Medications    ALUMINUM CHLORIDE 12 % SOLN    Externally apply topically daily    CHOLECALCIFEROL (VITAMIN D) 2000 UNITS TABLET    Take 1 tablet by mouth daily Take one tablet daily.    CLINDAMYCIN (CLEOCIN T) 1 % SOLUTION    Apply topically 2 times daily    HYDROXYZINE (ATARAX) 25 MG TABLET    Take 25 mg by mouth as needed for itching    HYDROXYZINE (VISTARIL) 25 MG CAPSULE    Take 25-50 mg by mouth    LEVONORGESTREL (KYLEENA) 19.5 MG IUD    1 each (19.5 mg) by Intrauterine route once for 1 dose    OMEGA-3 FATTY ACIDS (OMEGA-3 FISH OIL PO)    Take  "2 g by mouth 2 times daily     PROPRANOLOL (INDERAL LA) 80 MG 24 HR CAPSULE    Take 80 mg by mouth At Bedtime     PROPRANOLOL (INDERAL) 40 MG TABLET    Take 40 mg by mouth 3 times daily    RIFAXIMIN (XIFAXAN) 550 MG TABS TABLET    Take 1 tablet (550 mg) by mouth 2 times daily    VENLAFAXINE HCL (EFFEXOR PO)    Take 150 mg by mouth daily Take 2 tablets   Modified Medications    No medications on file   Discontinued Medications    LURASIDONE (LATUDA) 20 MG TABS TABLET    Take by mouth daily Take 1 and 1/2 tablets          Allergies   Allergen Reactions     Codeine Other (See Comments)     Other reaction(s): Tremors     Corn Dextrin      Penicillin G      Sulfa Drugs          I have reviewed the Medications, Allergies, Past Medical and Surgical History, and Social History in the Epic system.    Review of Systems   All other systems reviewed and are negative.      Physical Exam   BP: (!) 153/108  Pulse: 104  Temp: 98.6  F (37  C)  Resp: 18  Height: 165.1 cm (5' 5\")  Weight: 107.5 kg (237 lb)(scale)  SpO2: 98 %      Physical Exam   Constitutional: She is oriented to person, place, and time. No distress.   HENT:   Head: Normocephalic and atraumatic.   Eyes: Conjunctivae are normal. Pupils are equal, round, and reactive to light.   Neck: Normal range of motion. Neck supple.   Cardiovascular: Normal rate and intact distal pulses.   Pulmonary/Chest: Effort normal. No stridor. No respiratory distress. She has no wheezes. She has no rales. She exhibits no tenderness.   Abdominal: Soft. There is no tenderness.   Genitourinary: Vagina normal.   Genitourinary Comments: Blood in the vaginal vault   Musculoskeletal: Normal range of motion.   Neurological: She is alert and oriented to person, place, and time.   Skin: Skin is warm and dry. She is not diaphoretic.   Psychiatric: She has a normal mood and affect. Her behavior is normal. Thought content normal.   Nursing note and vitals reviewed.      ED Course        Procedures         "     EKG Interpretation:      Interpreted by Krunal Pastrana  Time reviewed: 1200  Symptoms at time of EKG: syncope  Rhythm: normal sinus   Rate: normal  Axis: normal  Ectopy: none  Conduction: normal  ST Segments/ T Waves: No ST-T wave changes  Q Waves: none  Comparison to prior: No old EKG available    Clinical Impression: normal EKG          Critical Care time:  none             Labs Ordered and Resulted from Time of ED Arrival Up to the Time of Departure from the ED   ROUTINE UA WITH MICROSCOPIC REFLEX TO CULTURE   WET PREP            Assessments & Plan (with Medical Decision Making)   29 yo F who presents with complaints of palpitations and episodes of shortness of breath.  On exam, her vital signs are normal and she is in no acute distress. Her EKG is NSR with no abnormalities noted. CXR is also normal.  She complained of some vaginal discharge and bleeding.  No signs of PID.  I recommended she follow up with her PCP regarding her palpitations.  She may benefit from a Ziopatch.  No risk for PE.  Will discharge to home.       I have reviewed the nursing notes.    I have reviewed the findings, diagnosis, plan and need for follow up with the patient.       Medication List      There are no discharge medications for this visit.         Final diagnoses:   None       4/1/2019   [facility], [place], EMERGENCY DEPARTMENT     Krunal Pastrana MD  04/03/19 0147

## 2019-04-02 NOTE — TELEPHONE ENCOUNTER
Spoke to Cristine who was seen in ER last night and we needed to reschedule her appt at [facility] today d/t provider emergency.    She reports ER told her it was anxiety,all test WNL..  She is still concerned.Still feels tight chest and SOB.  Informed her unfortunately we can not see her in clinic until 4/10/19 so if s/s don't resolve or worsen,  she will need to return to ER.Pt indicated understanding and agreed with plan.

## 2019-04-02 NOTE — ED TRIAGE NOTES
Patient presents ambulatory to triage with c/o SOB. Patient states SOB began approximately 45 minutes prior to arrival. Also reports lightheadedness, nausea, chest pain and numerous other complaints unrelated to tonight's chief complaint. Patient thinks it could be a panic attack.

## 2019-04-03 ENCOUNTER — TRANSFERRED RECORDS (OUTPATIENT)
Dept: HEALTH INFORMATION MANAGEMENT | Facility: CLINIC | Age: 29
End: 2019-04-03

## 2019-04-04 PROBLEM — A49.1 GBS (GROUP B STREPTOCOCCUS) INFECTION: Status: ACTIVE | Noted: 2019-04-04

## 2019-04-04 LAB
BACTERIA SPEC CULT: ABNORMAL
BACTERIA SPEC CULT: ABNORMAL
SPECIMEN SOURCE: ABNORMAL

## 2019-04-04 NOTE — RESULT ENCOUNTER NOTE
Final urine culture report is NEGATIVE per [place] ED Lab Result protocol.    If NEGATIVE result, no change in treatment, per [place] ED Lab Result protocol.

## 2019-04-04 NOTE — PROGRESS NOTES
SUBJECTIVE:   Cristine Caballero is a 28 year old female who presents to clinic today for a return visit.    # Fatigue  - this is her primary complaint today  - past 6 months or so, getting worse over the past 3 months  - difficult to wake up and get out of bed in the morning  - if she didn't set an alarm would sleep until the afternoon/evening  - usually goes to sleep at 10-11pm  - feels tired after she wakes up  - no napping usually and doesn't fall asleep in the day, just feels tired  - usually just one cup of coffee in the day in the morning, no other caffeine in the day usually  - drinks 1-2 alcoholic drinks a couple of times a week (2-3), socially  - no illicit substances  - no regular exercise because of fatigue  - frustrated by physical symptoms and fatigue but denies depressive symptoms or anhedonia otherwise  - no job or social or relationship changes prior to onset of symptoms, no recent losses      Review of Systems:   Constitutional - alternating feeling warm/cold over the past week, sweats at night.  unintentional weight loss/gain   Eyes - no vision concerns   Ears/Nose/Throat - sore throat yesterday - now improving, congestion, no hearing concerns, no ear pain, no dysphagia/   Cardiovascular - tightness in upper chest and left shoulder accompanied by palpitations 15-20 minutes after eating from 4/1 - 4/3, no current chest pain, palpitations   Pulmonary - cough started yesterday - productive of yellow phlegm, no shortness of breath, wheezing   GI - generalized abdominal discomfort past week, intermittent diarrhea past 3 months, constipated this week, nausea this past week, vomiting started yesterday    - burning and increased urinary frequency (but also drinking more water) the past week, spotting with IUD so unsure if hematuria   Musculoskeletal - all over body pain (especially neck/shoulder tightness) past couple of days  Integument - breakout on chin past 10 days, history of hidradenitis  supporativa   Neuro - intermittent tingling in hands and feet if she sits for a while, no focal weakness, numbness   Heme - no easy bruising/bleeding   Endocrine - no weight loss/gain, dry skin, excessive sweating, hair loss   Psychiatric - no feelings of depressed mood or anhedonia in past 2 weeks    Allergic/Immunologic -  no history of anaphylaxis, 2 UTIs in past 4-6 months    Brice Sleepiness Scale  Each situation receives a score of zero to three, which is related to the likelihood that sleep will be induced:    0 = would never doze, 1 = slight chance of dozing, 2 = moderate chance, 3 = high chance  - Sitting and readin  - Watching television: 0  - Sitting inactively in a public place: 0  - Riding as a passenger in a car for one hour without a break: 0  - Lying down to rest in the afternoon when circumstances permit: 0  - Sitting and talking with someone: 0  - Sitting quietly after lunch without alcohol: 0  - Sitting in a car as the , while stopped for a few minutes in traffic: 0   Total Score (>10 indicates excessive sleepiness): 0    - was evaluated for the above at the [facility] ED on 19  - had a normal EKG and normal CXR and was discharge home      Patient Health Questionnaire  Over the last 2 weeks, how often have you been bothered by any of the followin = Not at all  1 = Several Days  2= More than half the days  3= Nearly every day    Little interest or pleasure in doing things: 0  Feeling down, depressed, or hopeless: 0  Trouble falling or staying asleep, or sleeping too much: 1  Feeling tired or having little energy: 3  Poor appetite or overeatin  Feeling bad about yourself or that you are a failure or have let yourself or your family down: 0  Trouble concentrating on things, such as reading the newspaper or watching television: 0  Moving or speaking so slowly that other people could have noticed or being so fidgety or restless that you have been moving around a lot more than usual:  "0  Thoughts that you would be better off dead or of hurting yourself: 0  Total Score: 6    Generalized Anxiety Disorder 7-Item Scale  Each item scored 0-3 (0=\"not at all,\" 1=\"several days,\" 2=\"more than half the days,\" 3=\"nearly every day)    Over the last 2 weeks how often have you been bothered by the following problems?  1) Feeling nervous, anxious, or on edge? 1  2) Not being able to stop or control worrying? 0  3) Worrying too much about different things? 0  4) Trouble relaxing? 0  5) Being so restless that it is hard to sit still? 0  6) Becoming easily annoyed or irritable? 1  7) Feeling afraid as if something awful might happen? 1  Total Score: 3      Patient Active Problem List   Diagnosis     Generalized anxiety disorder     OCD (obsessive compulsive disorder)     Moderate recurrent major depression (H)     Environmental allergies     Irritable bowel syndrome     Small intestinal bacterial overgrowth     GBS (group B streptococcus) infection     Current Outpatient Medications   Medication     Cholecalciferol (VITAMIN D) 2000 UNITS tablet     hydrOXYzine (VISTARIL) 25 MG capsule     Omega-3 Fatty Acids (OMEGA-3 FISH OIL PO)     propranolol (INDERAL LA) 80 MG 24 hr capsule     propranolol (INDERAL) 40 MG tablet     Venlafaxine HCl (EFFEXOR PO)     Aluminum Chloride 12 % SOLN     clindamycin (CLEOCIN T) 1 % solution     hydrOXYzine (ATARAX) 25 MG tablet     levonorgestrel (KYLEENA) 19.5 MG IUD     rifaximin (XIFAXAN) 550 MG TABS tablet     No current facility-administered medications for this visit.        I have reviewed the patient's relevant past medical history.     OBJECTIVE:   /82 (BP Location: Right arm, Patient Position: Sitting, Cuff Size: Adult Large)   Pulse 82   Temp 98.6  F (37  C) (Oral)   Resp 16   Ht 1.651 m (5' 5\")   Wt 104.2 kg (229 lb 12 oz)   SpO2 100%   BMI 38.23 kg/m      Constitutional: well-appearing, appears stated age  Eyes: conjunctivae without erythema, sclera " anicteric.    Cardiac: regular rate and rhythm, normal S1/S2, no murmur/rubs/gallops  Respiratory: lungs clear to auscultation bilaterally, normal work of breathing, no wheezes/crackles  Abdomen: soft, diffuse mild tenderness but no rebound or guarding.   Psych: affect is full and appropriate, speech is fluent and non-pressured    ASSESSMENT AND PLAN:     VS stable, tolerating liquids at home, no significant dyspnea currently, abdomen mildly tender but no peritonitic features - ok to focus on fatigue as this is her primary complaint today.    (R53.82) Chronic fatigue  (primary encounter diagnosis)  Comment: Other than this week's more acute symptoms, she has not had much in the way of symptoms to point to an etiology other than her diarrhea.  Check CBC for anemia  Check TSH for hypothyroidism  Check CMP for chronic liver/renal disease  Check iron studies  Check TTG and total IgA for celiac disease  Check HIV test  Check A1c for DM  Check ESR as a catch-all for inflammatory conditions although with her recent illness it would need to be repeated if elevated.    Assuming all of the above are normal, would proceed with discussion of symptomatic relief without an organic etiology.  Denies mood symptoms that would contribute to fatigue.  Will have her return next week to follow up on labs and discuss management.    Plan: TSH with free T4 reflex, CBC with Diff Plt         ([facility]), Hemoglobin A1c ([facility]),         Comprehensive Metabolic Panel ([facility]), Iron         and iron binding capacity, Ferritin, IgA,         Tissue transglutaminase cosme IgA and IgG, HIV         Antigen Antibody Combo, Erythrocyte         sedimentation rate auto,    (R00.2) Palpitations  Comment: Were very much related to food intake and now resolved. Normal EKG and CXR in ED. Will have her trial ranitidine over the next week and if no recurrence will have her just self-monitor. If reoccurs, will consider Holter monitoring at next visit.   Plan: TSH  with free T4 reflex, CBC with Diff Plt         ([facility])          (R30.0) Dysuria  (R35.8) Polyuria  Comment: Had normal culture (other than low count of GBS) at ED on 4/1/19. Doesn't feel able to provide a urine sample right now. OK to return for repeat if dysuria persists.   Plan: UA with Microscopic, Urine Culture Aerobic         Bacterial          Frank Stack   [facility]  04/05/2019, 12:09 PM

## 2019-04-05 ENCOUNTER — OFFICE VISIT (OUTPATIENT)
Dept: FAMILY MEDICINE | Facility: CLINIC | Age: 29
End: 2019-04-05
Payer: MEDICARE

## 2019-04-05 VITALS
DIASTOLIC BLOOD PRESSURE: 82 MMHG | HEART RATE: 82 BPM | WEIGHT: 229.75 LBS | TEMPERATURE: 98.6 F | RESPIRATION RATE: 16 BRPM | OXYGEN SATURATION: 100 % | BODY MASS INDEX: 38.28 KG/M2 | SYSTOLIC BLOOD PRESSURE: 117 MMHG | HEIGHT: 65 IN

## 2019-04-05 DIAGNOSIS — R30.0 DYSURIA: ICD-10-CM

## 2019-04-05 DIAGNOSIS — R35.89 POLYURIA: ICD-10-CM

## 2019-04-05 DIAGNOSIS — R00.2 PALPITATIONS: ICD-10-CM

## 2019-04-05 DIAGNOSIS — R53.82 CHRONIC FATIGUE: Primary | ICD-10-CM

## 2019-04-05 LAB
% GRANULOCYTES: 64.5 %G (ref 40–75)
ALBUMIN SERPL-MCNC: 4 G/DL (ref 3.3–4.6)
ALBUMIN UR-MCNC: 10 MG/DL
ALP SERPL-CCNC: 67 U/L (ref 40–150)
ALT SERPL-CCNC: 27 U/L (ref 0–50)
APPEARANCE UR: CLEAR
AST SERPL-CCNC: 25 U/L (ref 0–45)
BILIRUB SERPL-MCNC: 0.6 MG/DL (ref 0.2–1.3)
BILIRUB UR QL STRIP: NEGATIVE
BUN SERPL-MCNC: 20 MG/DL (ref 5–24)
CALCIUM SERPL-MCNC: 9.7 MG/DL (ref 8.5–10.4)
CAOX CRY #/AREA URNS HPF: ABNORMAL /HPF
CHLORIDE SERPLBLD-SCNC: 101 MMOL/L (ref 94–109)
CO2 SERPL-SCNC: 32 MMOL/L (ref 20–32)
COLOR UR AUTO: YELLOW
CREAT SERPL-MCNC: 0.8 MG/DL (ref 0.6–1.3)
EGFR CALCULATED (BLACK REFERENCE): 109.1
EGFR CALCULATED (NON BLACK REFERENCE): 90.1
ERYTHROCYTE [DISTWIDTH] IN BLOOD BY AUTOMATED COUNT: 13 %
ERYTHROCYTE [SEDIMENTATION RATE] IN BLOOD BY WESTERGREN METHOD: 9 MM/H (ref 0–20)
GLUCOSE SERPL-MCNC: 101 MG/DL (ref 60–109)
GLUCOSE UR STRIP-MCNC: NEGATIVE MG/DL
GRANULOCYTES #: 6 K/UL (ref 1.6–8.3)
HBA1C MFR BLD: 5.9 % (ref 4.1–5.7)
HCT VFR BLD AUTO: 44.3 % (ref 35–47)
HEMOGLOBIN: 14.5 G/DL (ref 11.7–15.7)
HGB UR QL STRIP: NEGATIVE
KETONES UR STRIP-MCNC: NEGATIVE MG/DL
LEUKOCYTE ESTERASE UR QL STRIP: NEGATIVE
LYMPHOCYTES # BLD AUTO: 1.7 K/UL (ref 0.8–5.3)
LYMPHOCYTES NFR BLD AUTO: 18.2 %L (ref 20–48)
MCH RBC QN AUTO: 29.5 PG (ref 26.5–35)
MCHC RBC AUTO-ENTMCNC: 32.7 G/DL (ref 32–36)
MCV RBC AUTO: 90.2 FL (ref 78–100)
MID #: 1.6 K/UL (ref 0–2.2)
MID %: 17.3 %M (ref 0–20)
MUCOUS THREADS #/AREA URNS LPF: PRESENT /LPF
NITRATE UR QL: NEGATIVE
PH UR STRIP: 6 PH (ref 5–7)
PLATELET # BLD AUTO: 368 K/UL (ref 150–450)
POTASSIUM SERPL-SCNC: 4.3 MMOL/L (ref 3.4–5.3)
PROT SERPL-MCNC: 8 G/DL (ref 6.8–8.8)
RBC # BLD AUTO: 4.91 M/UL (ref 3.8–5.2)
RBC #/AREA URNS AUTO: 1 /HPF (ref 0–2)
SODIUM SERPL-SCNC: 142 MMOL/L (ref 136–145)
SOURCE: ABNORMAL
SP GR UR STRIP: 1.03 (ref 1–1.03)
UROBILINOGEN UR STRIP-MCNC: 2 MG/DL (ref 0–2)
WBC # BLD AUTO: 9.3 K/UL (ref 4–11)
WBC #/AREA URNS AUTO: 3 /HPF (ref 0–5)

## 2019-04-05 ASSESSMENT — MIFFLIN-ST. JEOR: SCORE: 1768.02

## 2019-04-06 LAB
BACTERIA SPEC CULT: ABNORMAL
FERRITIN SERPL-MCNC: 64 NG/ML (ref 12–150)
IRON SATN MFR SERPL: 15 % (ref 15–46)
IRON SERPL-MCNC: 62 UG/DL (ref 35–180)
SPECIMEN SOURCE: ABNORMAL
TIBC SERPL-MCNC: 418 UG/DL (ref 240–430)
TSH SERPL DL<=0.005 MIU/L-ACNC: 1.93 MU/L (ref 0.4–4)

## 2019-04-08 LAB
HIV 1+2 AB+HIV1 P24 AG SERPL QL IA: NONREACTIVE
IGA SERPL-MCNC: 104 MG/DL (ref 70–380)
TTG IGA SER-ACNC: <1 U/ML
TTG IGG SER-ACNC: <1 U/ML

## 2019-04-08 ASSESSMENT — ANXIETY QUESTIONNAIRES
5. BEING SO RESTLESS THAT IT IS HARD TO SIT STILL: NOT AT ALL
6. BECOMING EASILY ANNOYED OR IRRITABLE: SEVERAL DAYS
1. FEELING NERVOUS, ANXIOUS, OR ON EDGE: SEVERAL DAYS
IF YOU CHECKED OFF ANY PROBLEMS ON THIS QUESTIONNAIRE, HOW DIFFICULT HAVE THESE PROBLEMS MADE IT FOR YOU TO DO YOUR WORK, TAKE CARE OF THINGS AT HOME, OR GET ALONG WITH OTHER PEOPLE: SOMEWHAT DIFFICULT
2. NOT BEING ABLE TO STOP OR CONTROL WORRYING: NOT AT ALL
3. WORRYING TOO MUCH ABOUT DIFFERENT THINGS: NOT AT ALL
7. FEELING AFRAID AS IF SOMETHING AWFUL MIGHT HAPPEN: SEVERAL DAYS
GAD7 TOTAL SCORE: 3

## 2019-04-08 ASSESSMENT — PATIENT HEALTH QUESTIONNAIRE - PHQ9
SUM OF ALL RESPONSES TO PHQ QUESTIONS 1-9: 6
5. POOR APPETITE OR OVEREATING: NOT AT ALL

## 2019-04-09 ASSESSMENT — ANXIETY QUESTIONNAIRES: GAD7 TOTAL SCORE: 3

## 2019-04-10 NOTE — PROGRESS NOTES
SUBJECTIVE:   Cristine Caballero is a 28 year old female who presents to clinic today for a return visit.    # Chronic Fatigue  - First seen by this for this problem on 4/5/19  - duration approximately 6 months, worse over past 3 months  - does not feel refreshed by sleep    - has been on current dose of Effexor XR 300mg daily for years for MDD  - takes propranolol extended-release 80mg at bedtime plus 40mg short-acting three times a day for tremor - same dose for years. 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  - takes hydroxyzine 25mg - one to three capsules at bedtime as needed for sleep    - when working had been going to bed at 10 and waking up at 6:15  - even when she had this regular schedule, fatigue was still an issue and getting out of bed was still an issue  - doesn't toss and turn much at night, sheets not disheveled in the morning  - doesn't usually have to wake up at all at night  - does not snore and has not been told she gasps/has apneic episodes  - one partner has told her that she twitches at night but she has not noticed this being an issue  - usually just one cup of coffee in the day in the morning, no other caffeine in the day usually  - drinks 1-2 alcoholic drinks a couple of times a week (2-3), socially  - no illicit substances    - continues to have intermittent migratory abdominal pain, sometimes sharp, sometimes aching, sometimes cramping  - sometimes diarrhea, this week more constipated  - has history of both IBS and SIBO (diagnosed with hydrogen breath test per patient report)      4/5/19 lab results:  ESR 9  HIV nonreactive  TTG IgA <1; total IgA 104  Ferritin 64, transferrin saturation 15%  TSH 1.93  A1c 5.9%  Hgb 14.5  CMP wnl    ROS: Denies fevers, chills, chest pain, difficulty breathing, abdominal pain    Patient Active Problem List   Diagnosis     Generalized anxiety disorder     OCD (obsessive compulsive disorder)     Moderate recurrent  major depression (H)     Environmental allergies     Irritable bowel syndrome     Small intestinal bacterial overgrowth     GBS (group B streptococcus) infection     Current Outpatient Medications   Medication     Cholecalciferol (VITAMIN D) 2000 UNITS tablet     hydrOXYzine (ATARAX) 25 MG tablet     hydrOXYzine (VISTARIL) 25 MG capsule     Omega-3 Fatty Acids (OMEGA-3 FISH OIL PO)     omeprazole 20 MG tablet     propranolol (INDERAL LA) 80 MG 24 hr capsule     propranolol (INDERAL) 40 MG tablet     Venlafaxine HCl (EFFEXOR PO)     levonorgestrel (KYLEENA) 19.5 MG IUD     No current facility-administered medications for this visit.        I have reviewed the patient's relevant past medical history.     OBJECTIVE:   /78 (BP Location: Right arm, Patient Position: Sitting, Cuff Size: Adult Large)   Pulse 76   Temp 98.3  F (36.8  C) (Oral)   Resp 16   Wt 107.2 kg (236 lb 4 oz)   SpO2 98%   BMI 39.31 kg/m      Constitutional: well-appearing, appears stated age  Eyes: conjunctivae without erythema, sclera anicteric.   Cardiac: regular rate and rhythm, normal S1/S2, no murmur/rubs/gallops  Respiratory: lungs clear to auscultation bilaterally, normal work of breathing, no wheezes/crackles  Skin: no rashes, lesions, or wounds  Psych: affect is full and appropriate, speech is fluent and non-pressured    Orthostatic VS:  Layin/84 HR 73  Sitting, immediate: 126/87 HR 83  Sitting, 3 minutes: 118/84, HR 76  Standing, immediate 111/79 HR 77  Standing, 3 minutes: 123/86, HR 76  Had sensation of heart racing but no dizziness, lightheadedness    ASSESSMENT AND PLAN:     (R53.82) Chronic fatigue  (primary encounter diagnosis)  Comment: Normal laboratory evaluation as detailed above. Is well established with psychiatry and on a good dose of SNRI and patient does not feel that mood is significantly down. No obvious sleep pathology. No excessive alcohol use or drug use that would contribute. Her propranolol could  certainly be contributing to her fatigue - I have asked that she gradually discontinue her immediate release propranolol to see if that will positive impact her energy level.   She almost meets IOM criteria for Chronic Fatigue Syndrome:   1) Impairment in occupational, educational, social, or personal activities for >6 months, accompanied by fatigue, new onset, not alleviated by rest  2) Post-exertional malaise  3) Unrefreshing sleep  4) Either cognitive impairment OR objective orthostatic intolerance    She is currently missing the cognitive impairment or objective orthostatic intolerance components to meet all criteria. At this point, I would focus less on finding an organic etiology (in the absence of new symptoms that could help elucidate a cause) and more on symptomatic improvement. She is already working regularly with a therapist. I have encouraged her gradually increase her physical activity to try and improve tolerance.   Plan:     (R10.84) Abdominal pain, generalized  Comment: Cristine is worried about IBD possibly contributing to her abdominal symptoms and fatigue. Had normal ESR but this does not rule out IBD. Will check fecal calprotectin which has better sensitivity and have her follow up with gastroenterology to discuss whether SIBO could be playing a role in fatigue.   Plan: GASTROENTEROLOGY ADULT REF CONSULT ONLY,         Calprotectin Feces    I spent a total of 40 minutes face-to-face with Cristine Caballero during today s office visit. Over 50% of this time was spent counseling the patient and/or coordinating care regarding her fatigue.    Frank Stack   [facility]  04/11/2019, 2:00 PM

## 2019-04-11 ENCOUNTER — OFFICE VISIT (OUTPATIENT)
Dept: FAMILY MEDICINE | Facility: CLINIC | Age: 29
End: 2019-04-11
Payer: MEDICARE

## 2019-04-11 VITALS
RESPIRATION RATE: 16 BRPM | WEIGHT: 236.25 LBS | DIASTOLIC BLOOD PRESSURE: 78 MMHG | OXYGEN SATURATION: 98 % | BODY MASS INDEX: 39.31 KG/M2 | TEMPERATURE: 98.3 F | HEART RATE: 76 BPM | SYSTOLIC BLOOD PRESSURE: 115 MMHG

## 2019-04-11 DIAGNOSIS — R10.84 ABDOMINAL PAIN, GENERALIZED: ICD-10-CM

## 2019-04-11 DIAGNOSIS — R53.82 CHRONIC FATIGUE: Primary | ICD-10-CM

## 2019-04-11 PROBLEM — K63.8219 SMALL INTESTINAL BACTERIAL OVERGROWTH: Chronic | Status: ACTIVE | Noted: 2017-10-03

## 2019-04-11 RX ORDER — NICOTINE POLACRILEX 4 MG/1
20 GUM, CHEWING ORAL DAILY
COMMUNITY
End: 2019-08-12

## 2019-04-11 ASSESSMENT — ANXIETY QUESTIONNAIRES
6. BECOMING EASILY ANNOYED OR IRRITABLE: MORE THAN HALF THE DAYS
GAD7 TOTAL SCORE: 3
IF YOU CHECKED OFF ANY PROBLEMS ON THIS QUESTIONNAIRE, HOW DIFFICULT HAVE THESE PROBLEMS MADE IT FOR YOU TO DO YOUR WORK, TAKE CARE OF THINGS AT HOME, OR GET ALONG WITH OTHER PEOPLE: SOMEWHAT DIFFICULT
5. BEING SO RESTLESS THAT IT IS HARD TO SIT STILL: NOT AT ALL
3. WORRYING TOO MUCH ABOUT DIFFERENT THINGS: NOT AT ALL
7. FEELING AFRAID AS IF SOMETHING AWFUL MIGHT HAPPEN: NOT AT ALL
2. NOT BEING ABLE TO STOP OR CONTROL WORRYING: NOT AT ALL
1. FEELING NERVOUS, ANXIOUS, OR ON EDGE: SEVERAL DAYS

## 2019-04-11 ASSESSMENT — PATIENT HEALTH QUESTIONNAIRE - PHQ9
SUM OF ALL RESPONSES TO PHQ QUESTIONS 1-9: 15
5. POOR APPETITE OR OVEREATING: NOT AT ALL

## 2019-04-11 NOTE — NURSING NOTE
28 year old  Chief Complaint   Patient presents with     Fatigue     Results       Blood pressure 115/78, pulse 76, temperature 98.3  F (36.8  C), temperature source Oral, resp. rate 16, weight 107.2 kg (236 lb 4 oz), SpO2 98 %, not currently breastfeeding. Body mass index is 39.31 kg/m .  Patient Active Problem List   Diagnosis     Generalized anxiety disorder     OCD (obsessive compulsive disorder)     Moderate recurrent major depression (H)     Environmental allergies     Irritable bowel syndrome     Small intestinal bacterial overgrowth     GBS (group B streptococcus) infection       Wt Readings from Last 2 Encounters:   04/11/19 107.2 kg (236 lb 4 oz)   04/05/19 104.2 kg (229 lb 12 oz)     BP Readings from Last 3 Encounters:   04/11/19 115/78   04/05/19 117/82   04/02/19 (!) 129/99         Current Outpatient Medications   Medication     Cholecalciferol (VITAMIN D) 2000 UNITS tablet     hydrOXYzine (ATARAX) 25 MG tablet     hydrOXYzine (VISTARIL) 25 MG capsule     Omega-3 Fatty Acids (OMEGA-3 FISH OIL PO)     omeprazole 20 MG tablet     propranolol (INDERAL LA) 80 MG 24 hr capsule     propranolol (INDERAL) 40 MG tablet     Venlafaxine HCl (EFFEXOR PO)     levonorgestrel (KYLEENA) 19.5 MG IUD     No current facility-administered medications for this visit.        Social History     Tobacco Use     Smoking status: Never Smoker     Smokeless tobacco: Never Used   Substance Use Topics     Alcohol use: Yes     Alcohol/week: 1.5 oz     Comment: 1-2 EtOH drinks 2 days per week     Drug use: No       Health Maintenance Due   Topic Date Due     DEPRESSION ACTION PLAN  04/18/2007     DTAP/TDAP/TD IMMUNIZATION (2 - Td) 04/27/2017     INFLUENZA VACCINE (1) 09/01/2018       Lab Results   Component Value Date    PAP NIL 09/01/2016 April 11, 2019 1:46 PM

## 2019-04-12 ENCOUNTER — APPOINTMENT (OUTPATIENT)
Dept: CT IMAGING | Facility: CLINIC | Age: 29
End: 2019-04-12
Attending: EMERGENCY MEDICINE
Payer: MEDICARE

## 2019-04-12 ENCOUNTER — HOSPITAL ENCOUNTER (OUTPATIENT)
Facility: CLINIC | Age: 29
Setting detail: OBSERVATION
Discharge: HOME | End: 2019-04-13
Attending: EMERGENCY MEDICINE | Admitting: FAMILY MEDICINE
Payer: MEDICARE

## 2019-04-12 DIAGNOSIS — K58.1 IRRITABLE BOWEL SYNDROME WITH CONSTIPATION: Chronic | ICD-10-CM

## 2019-04-12 DIAGNOSIS — K92.1 HEMATOCHEZIA: ICD-10-CM

## 2019-04-12 DIAGNOSIS — R10.13 EPIGASTRIC PAIN: Primary | ICD-10-CM

## 2019-04-12 LAB
ALBUMIN SERPL-MCNC: 3.6 G/DL (ref 3.4–5)
ALBUMIN UR-MCNC: NEGATIVE MG/DL
ALP SERPL-CCNC: 92 U/L (ref 40–150)
ALT SERPL W P-5'-P-CCNC: 36 U/L (ref 0–50)
ANION GAP SERPL CALCULATED.3IONS-SCNC: 7 MMOL/L (ref 3–14)
APPEARANCE UR: CLEAR
AST SERPL W P-5'-P-CCNC: 19 U/L (ref 0–45)
BASOPHILS # BLD AUTO: 0 10E9/L (ref 0–0.2)
BASOPHILS NFR BLD AUTO: 0.3 %
BILIRUB SERPL-MCNC: 0.1 MG/DL (ref 0.2–1.3)
BILIRUB UR QL STRIP: NEGATIVE
BUN SERPL-MCNC: 13 MG/DL (ref 7–30)
CALCIUM SERPL-MCNC: 8.7 MG/DL (ref 8.5–10.1)
CHLORIDE SERPL-SCNC: 105 MMOL/L (ref 94–109)
CO2 BLDCOV-SCNC: 24 MMOL/L (ref 21–28)
CO2 SERPL-SCNC: 25 MMOL/L (ref 20–32)
COLOR UR AUTO: ABNORMAL
CREAT SERPL-MCNC: 0.63 MG/DL (ref 0.52–1.04)
CRP SERPL-MCNC: 6.1 MG/L (ref 0–8)
DIFFERENTIAL METHOD BLD: NORMAL
EOSINOPHIL # BLD AUTO: 0.3 10E9/L (ref 0–0.7)
EOSINOPHIL NFR BLD AUTO: 3.3 %
ERYTHROCYTE [DISTWIDTH] IN BLOOD BY AUTOMATED COUNT: 12.8 % (ref 10–15)
ERYTHROCYTE [SEDIMENTATION RATE] IN BLOOD BY WESTERGREN METHOD: 8 MM/H (ref 0–20)
GFR SERPL CREATININE-BSD FRML MDRD: >90 ML/MIN/{1.73_M2}
GLUCOSE SERPL-MCNC: 125 MG/DL (ref 70–99)
GLUCOSE UR STRIP-MCNC: NEGATIVE MG/DL
HCG UR QL: NEGATIVE
HCT VFR BLD AUTO: 42.7 % (ref 35–47)
HGB BLD-MCNC: 13.9 G/DL (ref 11.7–15.7)
HGB UR QL STRIP: ABNORMAL
IMM GRANULOCYTES # BLD: 0 10E9/L (ref 0–0.4)
IMM GRANULOCYTES NFR BLD: 0.3 %
INTERNAL QC OK POCT: YES
KETONES UR STRIP-MCNC: NEGATIVE MG/DL
LACTATE BLD-SCNC: 1.8 MMOL/L (ref 0.7–2.1)
LEUKOCYTE ESTERASE UR QL STRIP: NEGATIVE
LIPASE SERPL-CCNC: 130 U/L (ref 73–393)
LYMPHOCYTES # BLD AUTO: 2.7 10E9/L (ref 0.8–5.3)
LYMPHOCYTES NFR BLD AUTO: 34.4 %
MCH RBC QN AUTO: 29.8 PG (ref 26.5–33)
MCHC RBC AUTO-ENTMCNC: 32.6 G/DL (ref 31.5–36.5)
MCV RBC AUTO: 91 FL (ref 78–100)
MONOCYTES # BLD AUTO: 0.9 10E9/L (ref 0–1.3)
MONOCYTES NFR BLD AUTO: 11.4 %
NEUTROPHILS # BLD AUTO: 4 10E9/L (ref 1.6–8.3)
NEUTROPHILS NFR BLD AUTO: 50.3 %
NITRATE UR QL: NEGATIVE
NRBC # BLD AUTO: 0 10*3/UL
NRBC BLD AUTO-RTO: 0 /100
PCO2 BLDV: 39 MM HG (ref 40–50)
PH BLDV: 7.4 PH (ref 7.32–7.43)
PH UR STRIP: 7 PH (ref 5–7)
PLATELET # BLD AUTO: 394 10E9/L (ref 150–450)
PO2 BLDV: 55 MM HG (ref 25–47)
POTASSIUM SERPL-SCNC: 4 MMOL/L (ref 3.4–5.3)
PROT SERPL-MCNC: 7.4 G/DL (ref 6.8–8.8)
RBC # BLD AUTO: 4.67 10E12/L (ref 3.8–5.2)
RBC #/AREA URNS AUTO: 0 /HPF (ref 0–2)
SAO2 % BLDV FROM PO2: 88 %
SODIUM SERPL-SCNC: 138 MMOL/L (ref 133–144)
SOURCE: ABNORMAL
SP GR UR STRIP: 1 (ref 1–1.03)
UROBILINOGEN UR STRIP-MCNC: NORMAL MG/DL (ref 0–2)
WBC # BLD AUTO: 7.8 10E9/L (ref 4–11)
WBC #/AREA URNS AUTO: <1 /HPF (ref 0–5)

## 2019-04-12 PROCEDURE — 74177 CT ABD & PELVIS W/CONTRAST: CPT

## 2019-04-12 PROCEDURE — 83690 ASSAY OF LIPASE: CPT | Performed by: EMERGENCY MEDICINE

## 2019-04-12 PROCEDURE — 83605 ASSAY OF LACTIC ACID: CPT

## 2019-04-12 PROCEDURE — 25000128 H RX IP 250 OP 636: Performed by: EMERGENCY MEDICINE

## 2019-04-12 PROCEDURE — 85025 COMPLETE CBC W/AUTO DIFF WBC: CPT | Performed by: EMERGENCY MEDICINE

## 2019-04-12 PROCEDURE — 99285 EMERGENCY DEPT VISIT HI MDM: CPT | Mod: Z6 | Performed by: EMERGENCY MEDICINE

## 2019-04-12 PROCEDURE — 81025 URINE PREGNANCY TEST: CPT | Performed by: EMERGENCY MEDICINE

## 2019-04-12 PROCEDURE — 82803 BLOOD GASES ANY COMBINATION: CPT

## 2019-04-12 PROCEDURE — 99285 EMERGENCY DEPT VISIT HI MDM: CPT | Mod: 25 | Performed by: EMERGENCY MEDICINE

## 2019-04-12 PROCEDURE — 80053 COMPREHEN METABOLIC PANEL: CPT | Performed by: EMERGENCY MEDICINE

## 2019-04-12 PROCEDURE — 85652 RBC SED RATE AUTOMATED: CPT | Performed by: EMERGENCY MEDICINE

## 2019-04-12 PROCEDURE — 81001 URINALYSIS AUTO W/SCOPE: CPT | Performed by: EMERGENCY MEDICINE

## 2019-04-12 PROCEDURE — 86140 C-REACTIVE PROTEIN: CPT | Performed by: EMERGENCY MEDICINE

## 2019-04-12 RX ORDER — IOPAMIDOL 755 MG/ML
135 INJECTION, SOLUTION INTRAVASCULAR ONCE
Status: COMPLETED | OUTPATIENT
Start: 2019-04-12 | End: 2019-04-12

## 2019-04-12 RX ADMIN — IOPAMIDOL 135 ML: 755 INJECTION, SOLUTION INTRAVENOUS at 22:11

## 2019-04-12 ASSESSMENT — ENCOUNTER SYMPTOMS
DIAPHORESIS: 1
DIARRHEA: 1
ABDOMINAL DISTENTION: 1
BACK PAIN: 1
ABDOMINAL PAIN: 1
APPETITE CHANGE: 1
CONSTIPATION: 1
FATIGUE: 1
BLOOD IN STOOL: 1
FLANK PAIN: 1

## 2019-04-12 ASSESSMENT — ANXIETY QUESTIONNAIRES: GAD7 TOTAL SCORE: 3

## 2019-04-13 VITALS
RESPIRATION RATE: 16 BRPM | HEART RATE: 91 BPM | WEIGHT: 234.6 LBS | TEMPERATURE: 97.9 F | SYSTOLIC BLOOD PRESSURE: 117 MMHG | DIASTOLIC BLOOD PRESSURE: 80 MMHG | OXYGEN SATURATION: 98 % | BODY MASS INDEX: 39.04 KG/M2

## 2019-04-13 PROBLEM — R10.9 ABDOMINAL PAIN: Status: ACTIVE | Noted: 2019-04-13

## 2019-04-13 PROCEDURE — 25000132 ZZH RX MED GY IP 250 OP 250 PS 637: Mod: GY | Performed by: EMERGENCY MEDICINE

## 2019-04-13 PROCEDURE — G0378 HOSPITAL OBSERVATION PER HR: HCPCS

## 2019-04-13 PROCEDURE — 83516 IMMUNOASSAY NONANTIBODY: CPT | Performed by: INTERNAL MEDICINE

## 2019-04-13 PROCEDURE — 99220 ZZC INITIAL OBSERVATION CARE,LEVL III: CPT | Mod: Z6 | Performed by: PHYSICIAN ASSISTANT

## 2019-04-13 PROCEDURE — A9270 NON-COVERED ITEM OR SERVICE: HCPCS | Mod: GY | Performed by: EMERGENCY MEDICINE

## 2019-04-13 PROCEDURE — A9270 NON-COVERED ITEM OR SERVICE: HCPCS | Mod: GY | Performed by: PHYSICIAN ASSISTANT

## 2019-04-13 PROCEDURE — 36415 COLL VENOUS BLD VENIPUNCTURE: CPT | Performed by: INTERNAL MEDICINE

## 2019-04-13 PROCEDURE — 86431 RHEUMATOID FACTOR QUANT: CPT | Performed by: INTERNAL MEDICINE

## 2019-04-13 PROCEDURE — 25000132 ZZH RX MED GY IP 250 OP 250 PS 637: Mod: GY | Performed by: PHYSICIAN ASSISTANT

## 2019-04-13 PROCEDURE — 86038 ANTINUCLEAR ANTIBODIES: CPT | Performed by: INTERNAL MEDICINE

## 2019-04-13 RX ORDER — PROPRANOLOL HYDROCHLORIDE 40 MG/1
40 TABLET ORAL 3 TIMES DAILY
Status: DISCONTINUED | OUTPATIENT
Start: 2019-04-13 | End: 2019-04-13 | Stop reason: HOSPADM

## 2019-04-13 RX ORDER — PROCHLORPERAZINE 25 MG
25 SUPPOSITORY, RECTAL RECTAL EVERY 12 HOURS PRN
Status: DISCONTINUED | OUTPATIENT
Start: 2019-04-13 | End: 2019-04-13 | Stop reason: HOSPADM

## 2019-04-13 RX ORDER — NICOTINE POLACRILEX 4 MG/1
20 GUM, CHEWING ORAL DAILY
Status: DISCONTINUED | OUTPATIENT
Start: 2019-04-13 | End: 2019-04-13

## 2019-04-13 RX ORDER — ONDANSETRON 2 MG/ML
4 INJECTION INTRAMUSCULAR; INTRAVENOUS EVERY 6 HOURS PRN
Status: DISCONTINUED | OUTPATIENT
Start: 2019-04-13 | End: 2019-04-13 | Stop reason: HOSPADM

## 2019-04-13 RX ORDER — HYDROXYZINE HYDROCHLORIDE 25 MG/1
75 TABLET, FILM COATED ORAL AT BEDTIME
Status: DISCONTINUED | OUTPATIENT
Start: 2019-04-13 | End: 2019-04-13 | Stop reason: HOSPADM

## 2019-04-13 RX ORDER — NALOXONE HYDROCHLORIDE 0.4 MG/ML
.1-.4 INJECTION, SOLUTION INTRAMUSCULAR; INTRAVENOUS; SUBCUTANEOUS
Status: DISCONTINUED | OUTPATIENT
Start: 2019-04-13 | End: 2019-04-13

## 2019-04-13 RX ORDER — ACETAMINOPHEN 325 MG/1
650 TABLET ORAL EVERY 4 HOURS PRN
Status: DISCONTINUED | OUTPATIENT
Start: 2019-04-13 | End: 2019-04-13 | Stop reason: HOSPADM

## 2019-04-13 RX ORDER — PROPRANOLOL HYDROCHLORIDE 80 MG/1
80 CAPSULE, EXTENDED RELEASE ORAL AT BEDTIME
Status: DISCONTINUED | OUTPATIENT
Start: 2019-04-13 | End: 2019-04-13 | Stop reason: HOSPADM

## 2019-04-13 RX ORDER — PROCHLORPERAZINE MALEATE 5 MG
10 TABLET ORAL EVERY 6 HOURS PRN
Status: DISCONTINUED | OUTPATIENT
Start: 2019-04-13 | End: 2019-04-13 | Stop reason: HOSPADM

## 2019-04-13 RX ORDER — ACETAMINOPHEN 650 MG/1
650 SUPPOSITORY RECTAL EVERY 4 HOURS PRN
Status: DISCONTINUED | OUTPATIENT
Start: 2019-04-13 | End: 2019-04-13 | Stop reason: HOSPADM

## 2019-04-13 RX ORDER — HYDROXYZINE HYDROCHLORIDE 25 MG/1
75 TABLET, FILM COATED ORAL ONCE
Status: COMPLETED | OUTPATIENT
Start: 2019-04-13 | End: 2019-04-13

## 2019-04-13 RX ORDER — ONDANSETRON 4 MG/1
4 TABLET, ORALLY DISINTEGRATING ORAL EVERY 6 HOURS PRN
Status: DISCONTINUED | OUTPATIENT
Start: 2019-04-13 | End: 2019-04-13 | Stop reason: HOSPADM

## 2019-04-13 RX ORDER — VENLAFAXINE 100 MG/1
300 TABLET ORAL DAILY
Status: DISCONTINUED | OUTPATIENT
Start: 2019-04-13 | End: 2019-04-13 | Stop reason: HOSPADM

## 2019-04-13 RX ORDER — ONDANSETRON 4 MG/1
4 TABLET, ORALLY DISINTEGRATING ORAL EVERY 6 HOURS PRN
Status: DISCONTINUED | OUTPATIENT
Start: 2019-04-13 | End: 2019-04-13

## 2019-04-13 RX ORDER — POLYETHYLENE GLYCOL 3350 17 G/17G
17 POWDER, FOR SOLUTION ORAL 3 TIMES DAILY
Status: DISCONTINUED | OUTPATIENT
Start: 2019-04-13 | End: 2019-04-13 | Stop reason: HOSPADM

## 2019-04-13 RX ORDER — NALOXONE HYDROCHLORIDE 0.4 MG/ML
.1-.4 INJECTION, SOLUTION INTRAMUSCULAR; INTRAVENOUS; SUBCUTANEOUS
Status: DISCONTINUED | OUTPATIENT
Start: 2019-04-13 | End: 2019-04-13 | Stop reason: HOSPADM

## 2019-04-13 RX ORDER — ACETAMINOPHEN 500 MG
1000 TABLET ORAL EVERY 6 HOURS PRN
Status: DISCONTINUED | OUTPATIENT
Start: 2019-04-13 | End: 2019-04-13 | Stop reason: HOSPADM

## 2019-04-13 RX ORDER — ONDANSETRON 2 MG/ML
4 INJECTION INTRAMUSCULAR; INTRAVENOUS EVERY 6 HOURS PRN
Status: DISCONTINUED | OUTPATIENT
Start: 2019-04-13 | End: 2019-04-13

## 2019-04-13 RX ADMIN — POLYETHYLENE GLYCOL 3350 17 G: 17 POWDER, FOR SOLUTION ORAL at 09:44

## 2019-04-13 RX ADMIN — OMEPRAZOLE 40 MG: 20 CAPSULE, DELAYED RELEASE ORAL at 09:44

## 2019-04-13 RX ADMIN — HYDROXYZINE HYDROCHLORIDE 75 MG: 25 TABLET ORAL at 02:43

## 2019-04-13 RX ADMIN — VENLAFAXINE 300 MG: 100 TABLET ORAL at 10:06

## 2019-04-13 RX ADMIN — PROPRANOLOL HYDROCHLORIDE 40 MG: 40 TABLET ORAL at 20:03

## 2019-04-13 RX ADMIN — POLYETHYLENE GLYCOL 3350 17 G: 17 POWDER, FOR SOLUTION ORAL at 19:37

## 2019-04-13 RX ADMIN — PROPRANOLOL HYDROCHLORIDE 40 MG: 40 TABLET ORAL at 09:44

## 2019-04-13 RX ADMIN — PROPRANOLOL HYDROCHLORIDE 40 MG: 40 TABLET ORAL at 16:02

## 2019-04-13 ASSESSMENT — PAIN DESCRIPTION - DESCRIPTORS
DESCRIPTORS: ACHING
DESCRIPTORS: ACHING

## 2019-04-13 NOTE — PLAN OF CARE
1. -diagnostic tests and consults completed and resulted. No, in progress  2. -vital signs normal or at patient baseline. Yes, VSS.  3. -tolerating oral intake to maintain hydration. No, NPO.  4. -adequate pain control on oral analgesics. Yes, mild abdominal discomfort, 0 med required.   5. -returns to baseline functional status. No, weak, joint discomfort, abdominal pain.   6. -safe disposition plan has been identified. Yes, return home with roomates.   7. -GI consult. Yes seen this afternoon, waiting recommendations.  Concerned she has urinary problem, frequent voids, not emptying bladder. Bladder scan this afternoon post void only 65ml. Joint aches much improved. Abdominal pain receding with not eating, NPO except ice chips. No stool yet need various stool specs.

## 2019-04-13 NOTE — PROGRESS NOTES
Patient interviewed and examined. Labs, imaging and chart notes reviewed.  Cristine Caballero presents with intermittent abdominal pain with eating, alternating diarrhea and constipations, hematochezia, fatigue, malaise, myalgias and arthralgias which have been ongoing for several months, but worse int he past 2 weeks. She had a cough with some post tussive emesis a couple of weeks ago. She has bilateral flank and abdominal pain that waxes and wanes. She has had no fever or chills. She has had some dyspnea, chest tightness and diaphoresis for about 1 month. Dyspnea improved with PPI. She also notes mouth sores, polyuria, polydipsia, and voiding difficulty. She was seen in the ED last night. Abdominal CT, CBC, LFT, UA were unremarkable. She was admitted to ED observation for GI consultation.    Past Medical History:   Diagnosis Date     Anxiety      Anxiety disorder      Depressive disorder      Environmental allergies      Hx of eating disorder      IBS (irritable bowel syndrome)      Mild major depression (H)     ANW     OCD (obsessive compulsive disorder)     stable with CBT      Small intestinal bacterial overgrowth 2018    pt reported.      Suicide attempt by multiple drug overdose (H) 4.21.2016     Vitamin D deficiency        Past Surgical History:   Procedure Laterality Date     ABDOMEN SURGERY  01/2012    Exploratory Laparoscopy     biopsy  8-    vulvar     BIOPSY      GI, vulva- OK     COLONOSCOPY  2011     DENTAL SURGERY  2011     ENT SURGERY  2014    intubation & NG tube     ESOPHAGOSCOPY, GASTROSCOPY, DUODENOSCOPY (EGD), COMBINED  10/11/2011    Procedure:COMBINED ESOPHAGOSCOPY, GASTROSCOPY, DUODENOSCOPY (EGD), BIOPSY SINGLE OR MULTIPLE; Surgeon:TALA MONTEMAYOR; Location:[facility]     HC BREATH HYDROGEN TEST  10/4/2011    Procedure:HYDROGEN BREATH TEST; Surgeon:RADHA EPSTEIN; Location:[facility]     LAPAROSCOPY       ORTHOPEDIC SURGERY      left wrist- screw in left scaphiod     TONSILLECTOMY       WRIST  SURGERY  2004    screw placed L wrist after fracture       Family History   Problem Relation Age of Onset     Depression Mother      Allergies Mother      Asthma Mother      Thyroid Disease Mother      Eye Disorder Mother      Blood Disease Mother          essential thrombocytosis     Cancer Mother         AML, recent BMT     Anxiety Disorder Mother      Mental Illness Mother         Hoarding- OCD     Depression Sister      Anxiety Disorder Sister      Mental Illness Sister         OCD_ HOarding     Genitourinary Problems Sister      Asthma Sister      Eye Disorder Sister      Allergies Sister      Allergies Sister      Mental Illness Sister         ADHD     Depression Maternal Grandmother      Heart Disease Maternal Grandmother      Thyroid Disease Maternal Grandmother      Depression Other         aunt     Anxiety Disorder Other      Genitourinary Problems Other         aunt     Suicide Other      Heart Disease Maternal Grandfather      C.A.D. Maternal Grandfather      C.A.D. Paternal Grandfather      Cancer Paternal Grandfather      Eye Disorder Father      Mental Illness Father         OCPD?     Allergies Other         uncles       Social History     Tobacco Use     Smoking status: Never Smoker     Smokeless tobacco: Never Used   Substance Use Topics     Alcohol use: Yes     Alcohol/week: 1.5 oz     Comment: 1-2 EtOH drinks 2 days per week     Physical Exam:  Young female in NAD.  /85 (BP Location: Right arm)   Pulse 84   Temp 97.6  F (36.4  C) (Oral)   Resp 18   Wt 106.4 kg (234 lb 9.6 oz)   SpO2 97%   BMI 39.04 kg/m    HEENT: PERRLA. EOMI. Anicteric.  Neck: No mass or bruit.  Lungs: Clear to auscultation.  Cardiac: RRR. Normal S1 and S2. No murmurs.  Abdomen: Soft, non tender. BS active.  Extrem: No edema. No obvious arthritis/ effusions.  Neuro: CN, motor, sensory intact.  Psych: Normal mood and affect.Focused on physical complaints.    Labs/Imaging    Results for orders placed or performed during  the hospital encounter of 04/12/19 (from the past 24 hour(s))   UA with Microscopic   Result Value Ref Range    Color Urine Straw     Appearance Urine Clear     Glucose Urine Negative NEG^Negative mg/dL    Bilirubin Urine Negative NEG^Negative    Ketones Urine Negative NEG^Negative mg/dL    Specific Gravity Urine 1.002 (L) 1.003 - 1.035    Blood Urine Trace (A) NEG^Negative    pH Urine 7.0 5.0 - 7.0 pH    Protein Albumin Urine Negative NEG^Negative mg/dL    Urobilinogen mg/dL Normal 0.0 - 2.0 mg/dL    Nitrite Urine Negative NEG^Negative    Leukocyte Esterase Urine Negative NEG^Negative    Source Clean catch urine     WBC Urine <1 0 - 5 /HPF    RBC Urine 0 0 - 2 /HPF   hCG qual urine POCT   Result Value Ref Range    HCG Qual Urine Negative neg    Internal QC OK Yes    CBC with platelets differential   Result Value Ref Range    WBC 7.8 4.0 - 11.0 10e9/L    RBC Count 4.67 3.8 - 5.2 10e12/L    Hemoglobin 13.9 11.7 - 15.7 g/dL    Hematocrit 42.7 35.0 - 47.0 %    MCV 91 78 - 100 fl    MCH 29.8 26.5 - 33.0 pg    MCHC 32.6 31.5 - 36.5 g/dL    RDW 12.8 10.0 - 15.0 %    Platelet Count 394 150 - 450 10e9/L    Diff Method Automated Method     % Neutrophils 50.3 %    % Lymphocytes 34.4 %    % Monocytes 11.4 %    % Eosinophils 3.3 %    % Basophils 0.3 %    % Immature Granulocytes 0.3 %    Nucleated RBCs 0 0 /100    Absolute Neutrophil 4.0 1.6 - 8.3 10e9/L    Absolute Lymphocytes 2.7 0.8 - 5.3 10e9/L    Absolute Monocytes 0.9 0.0 - 1.3 10e9/L    Absolute Eosinophils 0.3 0.0 - 0.7 10e9/L    Absolute Basophils 0.0 0.0 - 0.2 10e9/L    Abs Immature Granulocytes 0.0 0 - 0.4 10e9/L    Absolute Nucleated RBC 0.0    Comprehensive metabolic panel   Result Value Ref Range    Sodium 138 133 - 144 mmol/L    Potassium 4.0 3.4 - 5.3 mmol/L    Chloride 105 94 - 109 mmol/L    Carbon Dioxide 25 20 - 32 mmol/L    Anion Gap 7 3 - 14 mmol/L    Glucose 125 (H) 70 - 99 mg/dL    Urea Nitrogen 13 7 - 30 mg/dL    Creatinine 0.63 0.52 - 1.04 mg/dL    GFR  Estimate >90 >60 mL/min/[1.73_m2]    GFR Estimate If Black >90 >60 mL/min/[1.73_m2]    Calcium 8.7 8.5 - 10.1 mg/dL    Bilirubin Total 0.1 (L) 0.2 - 1.3 mg/dL    Albumin 3.6 3.4 - 5.0 g/dL    Protein Total 7.4 6.8 - 8.8 g/dL    Alkaline Phosphatase 92 40 - 150 U/L    ALT 36 0 - 50 U/L    AST 19 0 - 45 U/L   Lipase   Result Value Ref Range    Lipase 130 73 - 393 U/L   Erythrocyte sedimentation rate auto   Result Value Ref Range    Sed Rate 8 0 - 20 mm/h   CRP inflammation   Result Value Ref Range    CRP Inflammation 6.1 0.0 - 8.0 mg/L   ISTAT gases lactate elias POCT   Result Value Ref Range    Ph Venous 7.40 7.32 - 7.43 pH    PCO2 Venous 39 (L) 40 - 50 mm Hg    PO2 Venous 55 (H) 25 - 47 mm Hg    Bicarbonate Venous 24 21 - 28 mmol/L    O2 Sat Venous 88 %    Lactic Acid 1.8 0.7 - 2.1 mmol/L   CT Abdomen Pelvis w Contrast    Narrative    EXAMINATION: CT ABDOMEN PELVIS W CONTRAST, 4/12/2019 10:22 PM    TECHNIQUE:  Helical CT images from the lung bases through the  symphysis pubis were obtained with IV contrast. Contrast dose:  iopamidol (ISOVUE-370) solution 135 mL      COMPARISON: CT 8/4/2010    HISTORY: Abd pain, gastroenteritis or colitis suspected; possible  crohn's    FINDINGS:    Abdomen and pelvis:   No dilated loops of bowel. The appendix is unremarkable. Moderate  colonic stool. The terminal ileum is unremarkable. Moderate colonic  stool. No pericolonic inflammatory stranding.    Liver, spleen, adrenal glands, gallbladder and pancreas are  unremarkable. No significant biliary dilatation. Symmetric renal  parenchymal enhancement. No hydronephrosis or hydroureter. Bladder is  partially distended and unremarkable. Uterus is retroflexed. IUD  within the expected location within the endometrial cavity. Adnexa are  within normal limits for age.    The abdominal aorta is within normal limits. No significant  lymphadenopathy of the abdomen or pelvis.    Lung bases: The lung bases are clear. No significant pleural  or  pericardial effusion.    Bones and soft tissues: No aggressive osseous lesions.      Impression    IMPRESSION:   No significant inflammatory changes of the bowel. No dilated loops of  bowel. Appendix is within normal limits. Moderate colonic stool.     I have personally reviewed the examination and initial interpretation  and I agree with the findings.    CONNOR JASON MD     Impression:  Young woman with past diagnoses of IBS, intestinal bacterial overgrowth, anxiety, depression, eating disorder and OCD presents with a variety of somatic complaints, including polyuria, polydipsia, voiding difficulty, fatigue, malaise, myalgias, arthralgias, back pain, abdominal pain, intermittent diarrhea, hematochezia. She had no inflammatory changes on CT. CBC, ESR and CRP are normal. LFTs and are normal. Recent thyroid studies are normal. She was seen by GI in consultation. They recommend outpatient endoscopy and outpatient workup. They do not plan to do any endoscopy during this stay. Dr Watson recommended beginning Elavil for symptomatic treatment (presumably of IBS or fibromyalgia). I think that with her history of suicidal ingestions in the past, tricyclic antidepressants do not seem ideal.. She needs to follow up in GI clinic and with her primary MD. A trial of fiber (e.g. Citrucel) and an antispasmotic such as hyoscyamine or Bentyl might be considered.    Carlos Elizalde MD

## 2019-04-13 NOTE — PLAN OF CARE
Obs goals    1. -diagnostic tests and consults completed and resulted. No, in progress  2. -vital signs normal or at patient baseline. Yes, VSS.  3. -tolerating oral intake to maintain hydration. No, started CLD but only taking sips  4. -adequate pain control on oral analgesics. Yes, mild abdominal discomfort, 0 med required. Using ice packs  5. -returns to baseline functional status. No, weak, joint discomfort, abdominal pain.   6. -safe disposition plan has been identified. Yes, return home with roomates.   7. -GI consult. Done.

## 2019-04-13 NOTE — H&P
[facility], [place]    History and Physical - ED Observation Service       Date of Admission:  4/12/2019    Assessment & Plan   Cristine Caballero is a 28 year old female admitted on 4/12/2019. She has a history of IBS, small intestinal bacterial overgrowth, exploratory laparoscopy (2011), anxiety, depression, and OCD who presents for evaluation of abdominal and back pain.     1. Abdominal Pain: worsening right-sided back and bilateral flank/upper abdominal pain with intermittent, sharp pains in bilateral pelvis. Decreased appetite, fatigue, cramping, nausea, difficulty passing gas/emptying bowels, bloating, melena, hematochezia. Reports episodes of profuse sweating, oral ulcers, increased thirst, and urinary frequency and inability to empty her bladder. Evaluated a week ago for post prandial chest pain, SOB, diaphoresis at [facility] with a normal abdominal ultrasound. started on Prilosec with improvement of those symptoms. Reports last BM was yesterday noon with some streaks of blood. Stool was normal and soft. In the ED,VSS, afebrile. Labs show normal CMP, lipase, lactic acid, CRP, CBC, VBG. UA unremarkable. BHcg negative. CT abdomen pelvis is negative for inflammatory changes of the bowel, no dilated loops, normal appendix; moderate colonic stool. She is being admitted for supportive cares and GI consult in the morning. Concern for IBD vs autoimmune diseae  - MIVF with NS at 125ml/hr  - Zofran, Compazine prn nausea  - Tylenol prn fever or pain  - Protonix 40mg IV BID  - NPO  - Miralax TID  - GI consult    Chronic Medical Problems:  # Tremors: - Continue with PTA Propranolol  # Anxiety/Depression: - Continue with PTA Effexor, Atarax       Diet: NPO  DVT Prophylaxis: Low Risk/Ambulatory with no VTE prophylaxis indicated  Briones Catheter: not present  Code Status: full    Disposition Plan   Expected discharge: Tomorrow, recommended to prior living arrangement once adequate pain management/  "tolerating PO medications and GI consult completed with dispo plan.  Entered: BENJA Cantu 04/13/2019, 12:47 AM     The patient's care was discussed with the Attending Physician, Dr. Farooq.    BENJA Cantu  [facility], [place]  Ascom: [id number]  Please see sticky note for cross cover information  ______________________________________________________________________    Chief Complaint   Worsening abdominal pain    History is obtained from the patient    History of Present Illness   Per ED Note: \"Cristine Caballero is a 28 year old female with a history of IBS, small intestinal bacterial overgrowth, exploratory laparoscopy (2011), anxiety, depression, and OCD who presents for evaluation of abdominal and back pain. Patient states she's been \"sick\" for the last 2 weeks with a variety of symptoms -- oral ulcers, increased thirst, and urinary frequency and inability to empty her bladder. She reports she was evaluated a week ago for some chest pain, shortness of breath, and sweating (primarily after eating) as well. Patient states she had had a regular workup, saw her primary, and was placed on omeprazole. She reports she's been taking the omeprazole for the last 7 days, and other than continued sweating, her chest pain and shortness of breath have resolved.     Today, however, patient presents for evaluation of right-sided back and bilateral flank/upper abdominal pain. She reports her pain is more severe today and has been spreading down both sides of her abdomen, noting intermittent, sharp pains in her bilateral pelvis both yesterday and today. Patient states her pain is associated with a decreased appetite, \"extreme\" fatigue, cramping, difficulty passing gas/emptying her bowels, and bloating. She also reports \"rotating\" episodes of explosive diarrhea and constipation, stating that she's been dealing with melena for \"awhile\" and recently, her stool has been dark maroon and " "clotted. Patient states she had had a colonoscopy scheduled last August for evaluation of her melena but was unable to get time off work for the procedure. She is unsure of any fevers as she does not have a readily available thermometer. Patient reports episodes of sweating \"earlier today\".     Reports some nausea in the last week. Reports last BM was yesterday noon with some streaks of blood. Stool was normal and soft.     2011 had a colonoscopy at [facility]. Had an EGD here at the [facility] with Dr. Rodriguez which reported gastric mucosal abnormality characterized by antral erythema.     In the ED, HR 60's-90's, 's-140's/70's-90's, RR 16, SaO2 95-99% on RA, Temp 98.6. Labs show normal CMP, lipase, lactic acid, CRP, CBC, VBG. UA unremarkable. BHcg negative. CT abdomen pelvis is negative for inflammatory changes of the bowel, no dilated loops, normal appendix; moderate colonic stool. She is being admitted for supportive cares and GI consult in the morning.     Review of Systems    The 10 point Review of Systems is negative other than noted in the HPI or here.     Past Medical History    I have reviewed this patient's medical history and updated it with pertinent information if needed.   Past Medical History:   Diagnosis Date     Anxiety      Anxiety disorder      Depressive disorder      Environmental allergies      Hx of eating disorder      IBS (irritable bowel syndrome)      Mild major depression (H)     ANW     OCD (obsessive compulsive disorder)     stable with CBT      Small intestinal bacterial overgrowth 2018    pt reported.      Suicide attempt by multiple drug overdose (H) 4.21.2016     Vitamin D deficiency        Past Surgical History   I have reviewed this patient's surgical history and updated it with pertinent information if needed.  Past Surgical History:   Procedure Laterality Date     ABDOMEN SURGERY  01/2012    Exploratory Laparoscopy     biopsy  8-    vulvar     BIOPSY      GI, vulva- OK "     COLONOSCOPY  2011     DENTAL SURGERY  2011     ENT SURGERY  2014    intubation & NG tube     ESOPHAGOSCOPY, GASTROSCOPY, DUODENOSCOPY (EGD), COMBINED  10/11/2011    Procedure:COMBINED ESOPHAGOSCOPY, GASTROSCOPY, DUODENOSCOPY (EGD), BIOPSY SINGLE OR MULTIPLE; Surgeon:TALA MONTEMAYOR; Location:[facility]     HC BREATH HYDROGEN TEST  10/4/2011    Procedure:HYDROGEN BREATH TEST; Surgeon:RADHA EPSTEIN; Location:[facility]     LAPAROSCOPY       ORTHOPEDIC SURGERY      left wrist- screw in left scaphiod     TONSILLECTOMY       WRIST SURGERY  2004    screw placed L wrist after fracture       Social History   I have reviewed this patient's social history and updated it with pertinent information if needed.  Social History     Tobacco Use     Smoking status: Never Smoker     Smokeless tobacco: Never Used   Substance Use Topics     Alcohol use: Yes     Alcohol/week: 1.5 oz     Comment: 1-2 EtOH drinks 2 days per week     Drug use: No       Family History   I have reviewed this patient's family history and updated it with pertinent information if needed.   Family History   Problem Relation Age of Onset     Depression Mother      Allergies Mother      Asthma Mother      Thyroid Disease Mother      Eye Disorder Mother      Blood Disease Mother          essential thrombocytosis     Cancer Mother         AML, recent BMT     Anxiety Disorder Mother      Mental Illness Mother         Hoarding- OCD     Depression Sister      Anxiety Disorder Sister      Mental Illness Sister         OCD_ HOarding     Genitourinary Problems Sister      Asthma Sister      Eye Disorder Sister      Allergies Sister      Allergies Sister      Mental Illness Sister         ADHD     Depression Maternal Grandmother      Heart Disease Maternal Grandmother      Thyroid Disease Maternal Grandmother      Depression Other         aunt     Anxiety Disorder Other      Genitourinary Problems Other         aunt     Suicide Other      Heart Disease Maternal Grandfather       MONIK Maternal Grandfather      MONIK Paternal Grandfather      Cancer Paternal Grandfather      Eye Disorder Father      Mental Illness Father         OCPD?     Allergies Other         uncles       Prior to Admission Medications   Prior to Admission Medications   Prescriptions Last Dose Informant Patient Reported? Taking?   Cholecalciferol (VITAMIN D) 2000 UNITS tablet 2019 at Unknown time  No Yes   Sig: Take 1 tablet by mouth daily Take one tablet daily.   Patient taking differently: Take 4,000 Units by mouth daily    Omega-3 Fatty Acids (OMEGA-3 FISH OIL PO)   Yes No   Sig: Take 2 g by mouth 2 times daily    Venlafaxine HCl (EFFEXOR PO) 2019 at Unknown time  Yes Yes   Sig: Take 150 mg by mouth daily Take 2 tablets   hydrOXYzine (ATARAX) 25 MG tablet   Yes No   Sig: Take 25 mg by mouth as needed for itching   hydrOXYzine (VISTARIL) 25 MG capsule   Yes No   Sig: Take 75 mg by mouth nightly as needed    levonorgestrel (KYLEENA) 19.5 MG IUD   No No   Si each (19.5 mg) by Intrauterine route once for 1 dose   Patient taking differently: 1 each by Intrauterine route once    omeprazole 20 MG tablet 2019 at Unknown time  Yes Yes   Sig: Take 20 mg by mouth daily   propranolol (INDERAL LA) 80 MG 24 hr capsule 2019 at Unknown time  Yes Yes   Sig: Take 80 mg by mouth At Bedtime    propranolol (INDERAL) 40 MG tablet 2019 at Unknown time  Yes Yes   Sig: Take 40 mg by mouth 3 times daily      Facility-Administered Medications: None     Allergies   Allergies   Allergen Reactions     Codeine Other (See Comments)     Other reaction(s): Tremors     Corn Dextrin      Penicillin G      Sulfa Drugs        Physical Exam   Vital Signs: Temp: 97.9  F (36.6  C) Temp src: Oral BP: 115/82 Pulse: 74   Resp: 16 SpO2: 96 % O2 Device: None (Room air)    Weight: 234 lbs 9.6 oz    Constitutional: awake, alert, cooperative, no apparent distress, and appears stated age  Eyes: Lids and lashes normal, pupils equal,  round and reactive to light, extra ocular muscles intact, sclera clear, conjunctiva normal  ENT: Normocephalic, without obvious abnormality, atraumatic, sinuses nontender on palpation, external ears without lesions, oral pharynx with moist mucous membranes, tonsils without erythema or exudates, gums normal and good dentition. 2 small ulcers on roof of mouth.   Hematologic / Lymphatic: no cervical lymphadenopathy  Respiratory: No increased work of breathing, good air exchange, clear to auscultation bilaterally, no crackles or wheezing  Cardiovascular: Normal apical impulse, regular rate and rhythm, normal S1 and S2, no S3 or S4, and no murmur noted  GI: No scars, normal bowel sounds, soft, non-distended, tenderness in epigastric and RUQ and LUQ, no masses palpated, no hepatosplenomegally  Skin: no bruising or bleeding  Musculoskeletal: There is no redness, warmth, or swelling of the joints.  Full range of motion noted.  Motor strength is 5 out of 5 all extremities bilaterally.  Tone is normal.  Neurologic: Awake, alert, oriented to name, place and time.  Cranial nerves II-XII are grossly intact.  Motor is 5 out of 5 bilaterally.  Cerebellar finger to nose, heel to shin intact.  Sensory is intact.  Babinski down going, Romberg negative, and gait is normal.  Neuropsychiatric: General: normal, calm and normal eye contact    Data   Data reviewed today: I reviewed all medications, new labs and imaging results over the last 24 hours.  Recent Labs   Lab 04/12/19 2117   WBC 7.8   HGB 13.9   MCV 91         POTASSIUM 4.0   CHLORIDE 105   CO2 25   BUN 13   CR 0.63   ANIONGAP 7   MODESTO 8.7   *   ALBUMIN 3.6   PROTTOTAL 7.4   BILITOTAL 0.1*   ALKPHOS 92   ALT 36   AST 19   LIPASE 130     Most Recent 3 CBC's:  Recent Labs   Lab Test 04/12/19  2117 04/05/19  1215 11/27/18  1456 02/03/16  0710   WBC 7.8  --  6.8 5.1   HGB 13.9 14.5 13.4 13.8   MCV 91 90.2 91 91     --  351 293     Most Recent 3  BMP's:  Recent Labs   Lab Test 04/12/19 2117 04/05/19  1215 11/27/18  1456  09/01/16  0850    142.0 139   < > 139   POTASSIUM 4.0 4.3 4.3   < > 4.4   CHLORIDE 105 101.0 107   < > 107   CO2 25 32.0 27   < > 25   BUN 13 20.0 23   < > 21   CR 0.63 0.8 0.64   < > 0.64   ANIONGAP 7  --  5  --  7   MODESTO 8.7 9.7 8.7   < > 8.6   * 101.0 91   < > 88    < > = values in this interval not displayed.     Most Recent 2 LFT's:  Recent Labs   Lab Test 04/12/19 2117 04/05/19 1215   AST 19 25.0   ALT 36 27.0   ALKPHOS 92 67.0   BILITOTAL 0.1* 0.6     Most Recent Urinalysis:  Recent Labs   Lab Test 04/12/19 2020   COLOR Straw   APPEARANCE Clear   URINEGLC Negative   URINEBILI Negative   URINEKETONE Negative   SG 1.002*   UBLD Trace*   URINEPH 7.0   PROTEIN Negative   NITRITE Negative   LEUKEST Negative   RBCU 0   WBCU <1     Recent Results (from the past 24 hour(s))   CT Abdomen Pelvis w Contrast    Narrative    EXAMINATION: CT ABDOMEN PELVIS W CONTRAST, 4/12/2019 10:22 PM    TECHNIQUE:  Helical CT images from the lung bases through the  symphysis pubis were obtained with IV contrast. Contrast dose:  iopamidol (ISOVUE-370) solution 135 mL      COMPARISON: CT 8/4/2010    HISTORY: Abd pain, gastroenteritis or colitis suspected; possible  crohn's    FINDINGS:    Abdomen and pelvis:   No dilated loops of bowel. The appendix is unremarkable. Moderate  colonic stool. The terminal ileum is unremarkable. Moderate colonic  stool. No pericolonic inflammatory stranding.    Liver, spleen, adrenal glands, gallbladder and pancreas are  unremarkable. No significant biliary dilatation. Symmetric renal  parenchymal enhancement. No hydronephrosis or hydroureter. Bladder is  partially distended and unremarkable. Uterus is retroflexed. IUD  within the uterine fundus. Adnexa are unremarkable for age.    The abdominal aorta is within normal limits. No significant  lymphadenopathy of the abdomen or pelvis.    Lung bases: The lung bases are  clear. No significant pleural or  pericardial effusion.    Bones and soft tissues: No aggressive osseous lesions.      Impression    IMPRESSION:   No significant inflammatory changes of the bowel. No dilated loops  bowel. Appendix is unremarkable. Moderate colonic stool.

## 2019-04-13 NOTE — ED PROVIDER NOTES
"  History     Chief Complaint   Patient presents with     Abdominal Pain     Back Pain     HPI  Cristine Caballero is a 28 year old female with a history of IBS, small intestinal bacterial overgrowth, exploratory laparoscopy (2011), anxiety, depression, and OCD who presents for evaluation of abdominal and back pain. Patient states she's been \"sick\" for the last 2 weeks with a variety of symptoms -- oral ulcers, increased thirst, and urinary frequency and inability to empty her bladder. She reports she was evaluated a week ago for some chest pain, shortness of breath, and sweating (primarily after eating) as well. Patient states she had had a regular workup, saw her primary, and was placed on omeprazole. She reports she's been taking the omeprazole for the last 7 days, and other than continued sweating, her chest pain and shortness of breath have resolved.    Today, however, patient presents for evaluation of right-sided back and bilateral flank/upper abdominal pain. She reports her pain is more severe today and has been spreading down both sides of her abdomen, noting intermittent, sharp pains in her bilateral pelvis both yesterday and today. Patient states her pain is associated with a decreased appetite, \"extreme\" fatigue, cramping, difficulty passing gas/emptying her bowels, and bloating. She also reports \"rotating\" episodes of explosive diarrhea and constipation, stating that she's been dealing with melena for \"awhile\" and recently, her stool has been dark maroon and clotted. Patient states she had had a colonoscopy scheduled last August for evaluation of her melena but was unable to get time off work for the procedure. She is unsure of any fevers as she does not have a readily available thermometer. Patient reports episodes of sweating \"earlier today\".     Past Medical History:   Diagnosis Date     Anxiety      Anxiety disorder      Depressive disorder      Environmental allergies      Hx of eating disorder      " IBS (irritable bowel syndrome)      Mild major depression (H)     ANW     OCD (obsessive compulsive disorder)     stable with CBT      Small intestinal bacterial overgrowth 2018    pt reported.      Suicide attempt by multiple drug overdose (H) 4.21.2016     Vitamin D deficiency        Past Surgical History:   Procedure Laterality Date     ABDOMEN SURGERY  01/2012    Exploratory Laparoscopy     biopsy  8-    vulvar     BIOPSY      GI, vulva- OK     COLONOSCOPY  2011     DENTAL SURGERY  2011     ENT SURGERY  2014    intubation & NG tube     ESOPHAGOSCOPY, GASTROSCOPY, DUODENOSCOPY (EGD), COMBINED  10/11/2011    Procedure:COMBINED ESOPHAGOSCOPY, GASTROSCOPY, DUODENOSCOPY (EGD), BIOPSY SINGLE OR MULTIPLE; Surgeon:TALA MONTEMAYOR; Location:[facility]     HC BREATH HYDROGEN TEST  10/4/2011    Procedure:HYDROGEN BREATH TEST; Surgeon:RADHA EPSTEIN; Location:[facility]     LAPAROSCOPY       ORTHOPEDIC SURGERY      left wrist- screw in left scaphiod     TONSILLECTOMY       WRIST SURGERY  2004    screw placed L wrist after fracture       Family History   Problem Relation Age of Onset     Depression Mother      Allergies Mother      Asthma Mother      Thyroid Disease Mother      Eye Disorder Mother      Blood Disease Mother          essential thrombocytosis     Cancer Mother         AML, recent BMT     Anxiety Disorder Mother      Mental Illness Mother         Hoarding- OCD     Depression Sister      Anxiety Disorder Sister      Mental Illness Sister         OCD_ HOarding     Genitourinary Problems Sister      Asthma Sister      Eye Disorder Sister      Allergies Sister      Allergies Sister      Mental Illness Sister         ADHD     Depression Maternal Grandmother      Heart Disease Maternal Grandmother      Thyroid Disease Maternal Grandmother      Depression Other         aunt     Anxiety Disorder Other      Genitourinary Problems Other         aunt     Suicide Other      Heart Disease Maternal Grandfather      CAndresALOPEZ  Maternal Grandfather      C.A.D. Paternal Grandfather      Cancer Paternal Grandfather      Eye Disorder Father      Mental Illness Father         OCPD?     Allergies Other         uncles       Social History     Tobacco Use     Smoking status: Never Smoker     Smokeless tobacco: Never Used   Substance Use Topics     Alcohol use: Yes     Alcohol/week: 1.5 oz     Comment: 1-2 EtOH drinks 2 days per week       No current facility-administered medications for this encounter.      Current Outpatient Medications   Medication     Cholecalciferol (VITAMIN D) 2000 UNITS tablet     omeprazole 20 MG tablet     propranolol (INDERAL LA) 80 MG 24 hr capsule     propranolol (INDERAL) 40 MG tablet     Venlafaxine HCl (EFFEXOR PO)     hydrOXYzine (ATARAX) 25 MG tablet     hydrOXYzine (VISTARIL) 25 MG capsule     levonorgestrel (KYLEENA) 19.5 MG IUD     Omega-3 Fatty Acids (OMEGA-3 FISH OIL PO)        Allergies   Allergen Reactions     Codeine Other (See Comments)     Other reaction(s): Tremors     Corn Dextrin      Penicillin G      Sulfa Drugs      I have reviewed the Medications, Allergies, Past Medical and Surgical History, and Social History in the Epic system.    Review of Systems   Constitutional: Positive for appetite change (decreased), diaphoresis and fatigue.   Gastrointestinal: Positive for abdominal distention, abdominal pain (diffuse, upper and lower), blood in stool, constipation and diarrhea.   Genitourinary: Positive for flank pain (b/l).   Musculoskeletal: Positive for back pain (right-sided).   All other systems reviewed and are negative.      Physical Exam   BP: (!) 132/94  Pulse: 96  Temp: 98.6  F (37  C)  Resp: 16  Weight: 106.4 kg (234 lb 9.6 oz)  SpO2: 97 %      Physical Exam  Gen:A&Ox3, no acute distress, diaphoretic, pale  HEENT:PERRL, no facial tenderness or wounds, head atraumatic, oropharynx clear, mucous membranes moist, TMs clear bilaterally, healing sores to lateral tongue  Neck:no bony tenderness or  step offs, no JVD, trachea midline  Back: no CVA tenderness, no midline bony tenderness  CV:RRR without murmurs  PULM:Clear to auscultation bilaterally  Abd:soft, non-distended, bowel sounds hyperactive, mild diffuse tenderness without guarding or rebound tenderness  Rectal: small, non-thrombosed external hemorrhoid, no fissure, no perianal abscess, stool guiac +  UE:No traumatic injuries, skin normal  LE:no traumatic injuries, skin normal, no LE edema.   Neuro:CN II-XII intact, strength 5/5 throughout, gait stable.   Skin: no rashes or ecchymoses        ED Course        Procedures       8:36 PM  The patient was seen and examined by Dr. Varela in Room 14.          Critical Care time:  none             Labs Ordered and Resulted from Time of ED Arrival Up to the Time of Departure from the ED   ROUTINE UA WITH MICROSCOPIC - Abnormal; Notable for the following components:       Result Value    Specific Gravity Urine 1.002 (*)     Blood Urine Trace (*)     All other components within normal limits   COMPREHENSIVE METABOLIC PANEL - Abnormal; Notable for the following components:    Glucose 125 (*)     Bilirubin Total 0.1 (*)     All other components within normal limits   ISTAT  GASES LACTATE BELKIS POCT - Abnormal; Notable for the following components:    PCO2 Venous 39 (*)     PO2 Venous 55 (*)     All other components within normal limits   HCG QUAL URINE POCT - Normal   CBC WITH PLATELETS DIFFERENTIAL   LIPASE   ERYTHROCYTE SEDIMENTATION RATE AUTO   CRP INFLAMMATION   PERIPHERAL IV CATHETER   ISTAT CG4 GASES LACTATE EBLKIS NURSING POCT   FREE WATER   ENTERIC BACTERIA AND VIRUS PANEL BY DORIAN STOOL   OVA AND PARASITE EXAM ROUTINE   CLOSTRIDIUM DIFFICILE TOXIN B            Assessments & Plan (with Medical Decision Making)   28-year-old female presenting with abdominal pain, hematochezia, diarrhea, cold sweats, and generalized fatigue.    Differential diagnosis is broad, including viral infection, IBS, hemorrhoidal bleeding, but  given her constellation of symptoms, Crohn's or also colitis is high on the differential.    No current urinary tract symptoms.   No vaginal symptoms and a recent pelvic exam is unremarkable.    IV access was obtained and laboratory testing done.    Labs, including a ESR, CRP, CMP, and CBC are within normal limits.  Lactic acid 1.8, lipase 130, blood sugar 125.    She has had a recent normal TSH testing.  Today, urinalysis is without signs of acute infection and pregnancy test is negative.    She was sent for a CT abdomen and pelvis, which does not show a cause for her symptoms, but given her urgency of stool and bloody stool with systemic symptoms, we will plan to send stool studies and admit her to the ED Obs unit for GI evaluation.    This part of the document was transcribed by Felix Laura Medical Scribe.       12:54 AM  Discussed with GI fellow. They will see her tomorrow, and do not recommend starting bowel prep tonight.   Stool infectious studies ordered and pending sample availability.     I have reviewed the nursing notes.    I have reviewed the findings, diagnosis, plan and need for follow up with the patient.       Medication List      There are no discharge medications for this visit.         Final diagnoses:   Hematochezia   IFelix, am serving as a trained medical scribe to document services personally performed by Cyndy Varela MD, based on the provider's statements to me.   ICyndy MD, was physically present and have reviewed and verified the accuracy of this note documented by Felix Laura.    4/12/2019   [facility], EMERGENCY DEPARTMENT    MD TARYN Boyle Katrina Anne, MD  04/13/19 0100

## 2019-04-13 NOTE — CONSULTS
The full note is dictated.  The differential diagnosis is broad, including IBD, systemic rheumatologic disorder, [place]'s, etc. However, there is little objective evidence for any of these.  Most likely, this is a functional disorder of brain-gut (brain-body) communication.      Endoscopic tests, including colonoscopy and EGD, are reasonable (given rectal bleeding, abdominal pain unresponsive to acid suppression).  These can be done on an outpatient basis.    Management of functional GI disorders is challenging, and optimally includes GI health psychology, nutrition counseling, and medical management.  All of these can be established on an outpatient basis.  The first medication recommendation is Elavil 25 mg at bedtime.

## 2019-04-14 PROCEDURE — 83993 ASSAY FOR CALPROTECTIN FECAL: CPT | Performed by: FAMILY MEDICINE

## 2019-04-14 PROCEDURE — 87209 SMEAR COMPLEX STAIN: CPT | Performed by: INTERNAL MEDICINE

## 2019-04-14 PROCEDURE — 87177 OVA AND PARASITES SMEARS: CPT | Performed by: INTERNAL MEDICINE

## 2019-04-14 PROCEDURE — 99217 ZZC OBSERVATION CARE DISCHARGE: CPT | Mod: Z6 | Performed by: INTERNAL MEDICINE

## 2019-04-14 PROCEDURE — 87493 C DIFF AMPLIFIED PROBE: CPT | Performed by: INTERNAL MEDICINE

## 2019-04-14 NOTE — PROGRESS NOTES
Notifed Savannah YOUSIF on floor about elevated BP and discharge now.Orderedd to give Inderal 40 mg early and recheck BP.

## 2019-04-14 NOTE — DISCHARGE INSTRUCTIONS
Pt given discharge instructions and went over med list with her and gave Inderal early for BP./80 (BP Location: Right arm)   Pulse 91   Temp 97.9  F (36.6  C) (Oral)   Resp 16   Wt 106.4 kg (234 lb 9.6 oz)   SpO2 98%   BMI 39.04 kg/m  RA.Pt has generalized pain and in joints.Very knowlegable about her meds and has them all at home and no refills needed now.Will call for GI appointment on Monday and phone number given.Pt up walking around and has no questions at this time.No c/o CP,nausea or dizziness.Friend is here to pick her up and at front door.Patient walked out with her belongings.

## 2019-04-14 NOTE — PROGRESS NOTES
Pt has not had any stool out and Miralax given and no results yet.Hat and stool container given and will bring to clinic.

## 2019-04-14 NOTE — CONSULTS
Consult Date:  04/13/2019      GASTROENTEROLOGY CONSULTATION       REFERRING PHYSICIAN:  Requested by Dr. Gomez Farooq per Medicine Service in evaluation of hematochezia and abdominal pains.      HISTORY OF PRESENT ILLNESS:  The patient is a 28-year-old woman with history of irritable bowel syndrome and small intestinal bacterial overgrowth, who presents with extreme fatigue, joint pains, loose bowel movements associated with extreme urgency, rectal bleeding and abdominal pain.  The patient has history of mental illness that includes generalized anxiety disorder, obsessive-compulsive disorder, recurrent major depression.  At this time she says she has an excellent mental health team and is actually in the best place (mentally) she has been in a long time.  However, over the past 5 months she has been experiencing greater degree of abdominal pains and fatigue that make it very difficult for her to function.     The patient has a history of irritable bowel syndrome associated with constipation; however, 5 months ago this changed to a loose bowel movements with frequency of 2-3 times per day, sometimes explosive and always with significant urgency.  She had some episodes of fecal incontinence.  With that, she is also noted some blood in her stools.  She did have a colonoscopy done in 2009 at [facility] in workup of iron-deficiency anemia and that was normal at the time.  The patient is not iron deficient currently and has a normal hemoglobin of 13.9.  She had just been seen by her primary care physician for similar problems on 04/11.  She was noted to have the chronic fatigue and, in fact, her primary considered whether she has developed chronic fatigue syndrome.  It is also confirmed in the primary care note that she is well established with her mental health team and is on a good dose of SNRI.  The symptoms of malaise, fatigue and unrefreshing sleep were also noted.  The possibility of inflammatory  bowel disease was considered, although normal ESR and CRP levels were noted as well.  Fecal calprotectin test was ordered but was not collected yet.      The patient's antibiotic history includes treatment of SIBO about once or twice a year with rifaximin.  Last time was in 11/2018.  The rifaximin used to help her bloating symptoms considerably, although she did not feel much benefit with the last round.  She also had some urinary infection and may have had antibiotics for that around the same time, possibly ciprofloxacin.  She was not sure whether the symptoms that she is describing over the past 5 months coincided in onset with this antibiotic exposure, but thought that they started before.     SOCIAL HISTORY.  The patient used to work at [employer].  She is currently unemployed.  She does not drink alcohol or smoke tobacco.     REVIEW OF SYSTEMS:  She notes that she feels better when she is not eating.  She has no trouble swallowing.  She denied problems with cough, shortness of breath, sputum production, chest pains.  She noted eruptions of acne and mouth ulcers and also a darkening of the arm pits.  Also, she noted some bruises on her abdomen.  She complained of joint pains as noted in the HPI.  These are diffuse and affect that small and large joints.  Urinary system:  She noted some occasional difficulties in urine evacuation.      PHYSICAL EXAMINATION:   GENERAL:  The patient is in no acute distress.   VITAL SIGNS:  Stable with blood pressure of 130/85, pulse 84, respiratory rate of 18, temperature 97.6.  Her weight was 106 kg with a BMI approximately of 39.   CARDIOVASCULAR:  Exam showed regular rate and rhythm with no murmur, rub or gallop.   RESPIRATORY:  Lungs were clear to auscultation.   GASTROINTESTINAL:  Abdomen was obese.  Superficial small bruises were noted in the epigastric area.  There was no tenderness.  No hepatosplenomegaly could be appreciated.     SKIN:  The arm pits were somewhat dark, but  probably within normal range.   JOINT EXAMINATION:  No swelling or erythema were noted in any of the joints.        LABORATORY DATA:  Laboratory evaluation was notable for albumin of 3.6, normal LFTs, normal electrolytes including potassium of 4.4.  Normal inflammatory markers including sed rate and CRP.  Enteric pathogen studies on stool are still pending.        IMAGING:  She had a CT scan of her abdomen performed yesterday that was reviewed and was unremarkable.  There was no evidence of inflammation.      ASSESSMENT AND RECOMMENDATIONS:  The differential diagnosis at this time is fairly broad and includes infectious disease to explain the change in the bowel movements with explosive watery stools.  The antibiotic exposure is notable and given that the patient actually worked at [employer] and has been around health care facilities the possibility of Clostridium difficile infection has to be entertained and those studies are pending.  The patient has not had a bowel movement yet.  The possibility of a systemic inflammatory condition such as inflammatory bowel disease or rheumatologic problems such as lupus can be considered; at this time there is little evidence for that and inflammatory markers are normal.  I agree that fecal calprotectin would be very helpful in evaluation for possible inflammatory bowel disease.  Endoscopic studies such as a colonoscopy and upper endoscopy can be done, both of which would be fairly low yield, but also reasonably definitive.  The most likely possibility is that we are dealing with a functional problem of brain-gut communication disorder.  It probably is broader than that and is body-brain communication disorder to account for fatigue and joint symptoms.  I described our basic approach to this category of diseases from the GI standpoint that rests on 3 pillars including health psychology, nutrition support as well as medical management that includes medications  to help the patient heal.      My recommendation at this time would be to initiate amitriptyline at 25 mg per day as a first step toward that management.  I went over other possible medications that we have for IBS management that include antispasmodics, peppermint oil, peripheral acting opioid and serotonin agonist antagonist.  Elavil was recommended in part because she does have sleep problems and it is one of the first line treatments for IBS as well as various pain syndromes.  I explained that the journey in treatment of functional GI disease is typically long, made up of many incremental steps and our goal is not a cure, but improvement.  Most patients do improve, but the journey can be fairly intensive.         JOHN RIVERA MD             D: 2019   T: 2019   MT: GLORIA      Name:     NUNU HUTTON   MRN:      [id number]        Account:       [id number]   :      1990           Consult Date:  2019      Document: [id number]

## 2019-04-14 NOTE — PROGRESS NOTES
/80 (BP Location: Right arm)   Pulse 91   Temp 97.9  F (36.6  C) (Oral)   Resp 16   Wt 106.4 kg (234 lb 9.6 oz)   SpO2 98%   BMI 39.04 kg/m  Pt ready for discharge after discharge instructions given and gone over.Pt will call GI clinic on Monday for appt for GI.Has no questions at this time and IV Dcd.No c/o CP ,dizziness or nausea.Pt very knowlegable of meds and has them at home.Up walking in room and has friend waiting at front door.Has all her belongings and walked out and refused wheel chair.

## 2019-04-14 NOTE — PLAN OF CARE
Obs goals     1. -diagnostic tests and consults completed and resulted. NO in progress.Drawn and pending Was told it takes 3 days. Will call GI on Monday and has phone number.  2. -vital signs normal or at patient baseline. Yes, VSS.  3. -tolerating oral intake to maintain hydration. Yes started CLD but only taking sips  4. -adequate pain control on oral analgesics. Yes, mild abdominal discomfort, 0 med required. Using ice packs  5. -returns to baseline functional status. No, weak, joint discomfort, abdominal pain.   6. -safe disposition plan has been identified. Yes, return home with roomates.   7. -GI consult. Done.

## 2019-04-14 NOTE — DISCHARGE SUMMARY
Discharge Summary    Cristine Caballero MRN# [id number]   YOB: 1990 Age: 28 year old     Date of Admission:  4/12/2019  Date of Discharge:  4/14/2019  Admitting Physician:  Gomez Farooq MD  Discharge Physician:  LOIS JUAREZ  Discharging Service:  Emergency Department Observation Unit     Primary Provider: Natalie Desai          Discharge Diagnosis:     Abdominal pain    * No resolved hospital problems. *               Discharge Disposition:   Discharged to home           Condition on Discharge:   Discharge condition: Stable   Code status on discharge: Full Code           Procedures:   No procedures performed during this admission          Discharge Medications:     Current Discharge Medication List      CONTINUE these medications which have NOT CHANGED    Details   Cholecalciferol (VITAMIN D) 2000 UNITS tablet Take 1 tablet by mouth daily Take one tablet daily.  Qty: 90 tablet, Refills: 3    Associated Diagnoses: Unspecified vitamin D deficiency      omeprazole 20 MG tablet Take 20 mg by mouth daily      propranolol (INDERAL LA) 80 MG 24 hr capsule Take 80 mg by mouth At Bedtime       propranolol (INDERAL) 40 MG tablet Take 40 mg by mouth 3 times daily      Venlafaxine HCl (EFFEXOR PO) Take 300 mg by mouth daily Take 2 tablets      hydrOXYzine (VISTARIL) 25 MG capsule Take 75 mg by mouth nightly as needed       levonorgestrel (KYLEENA) 19.5 MG IUD 1 each (19.5 mg) by Intrauterine route once for 1 dose  Qty: 1 each, Refills: 0    Associated Diagnoses: Encounter for IUD insertion; Encounter for insertion of intrauterine contraceptive device      Omega-3 Fatty Acids (OMEGA-3 FISH OIL PO) Take 2 g by mouth 2 times daily                    Consultations:   Consultation during this admission received from gastroenterology             Brief History of Illness:   Cristine Caballero is a 28 year old female admitted on 4/12/2019. She has a history of IBS, small intestinal bacterial overgrowth,  exploratory laparoscopy (2011), anxiety, depression, and OCD who presents for evaluation of abdominal and back pain.           Hospital Course:   1. Abdominal Pain: worsening right-sided back and bilateral flank/upper abdominal pain with intermittent, sharp pains in bilateral pelvis. Decreased appetite, fatigue, cramping, nausea, difficulty passing gas/emptying bowels, bloating, melena, hematochezia. Reports episodes of profuse sweating, oral ulcers, increased thirst, and urinary frequency and inability to empty her bladder. Evaluated a week ago for post prandial chest pain, SOB, diaphoresis at [facility] [place] with a normal abdominal ultrasound. started on Prilosec with improvement of those symptoms. Reports last BM was yesterday noon with some streaks of blood. Stool was normal and soft. In the ED,VSS, afebrile. Labs show normal CMP, lipase, lactic acid, CRP, CBC, VBG. UA unremarkable. BHcg negative. CT abdomen pelvis is negative for inflammatory changes of the bowel, no dilated loops, normal appendix; moderate colonic stool. She is being admitted for supportive cares and GI consult in the morning. Concern for IBD vs autoimmune disease. GI was consulted. Per GI, patient likely has functional GI disorder. Endoscopic tests, including colonoscopy and EGD, are reasonable (given rectal bleeding, abdominal pain unresponsive to acid suppression). These can be done on an outpatient basis. A that time will also discuss medication management of IBS. Stool studies including CDiff, EPASS, O & P, and fecal calprotectin test was ordered but was not collected yet as patient was unable to given stool sample. She has a stool kit at home. Advised to to bring stool sample to lab for further work up. GI referral placed. Patient was feeling medically improved at discharge. She was able to advance diet without issues. No nausea or vomiting. Afebrile. Will follow up with GI as planned.     Chronic Medical Problems:  # Tremors: - Continue with PTA  Propranolol  # Anxiety/Depression: - Continue with PTA Effexor, Atarax                     Final Day of Progress before Discharge:       Physical Exam:  Blood pressure 119/82, pulse 84, temperature 98.3  F (36.8  C), temperature source Oral, resp. rate 14, weight 106.4 kg (234 lb 9.6 oz), SpO2 94 %, not currently breastfeeding.    EXAM:  Physical Exam   Constitutional: Pt is oriented to person, place, and time.Pt appears well-developed and well-nourished.   HENT:   Head: Normocephalic and atraumatic.   Eyes: Conjunctivae are normal. Pupils are equal, round, and reactive to light.   Neck: Normal range of motion. Neck supple.   Cardiovascular: Normal rate, regular rhythm, normal heart sounds and intact distal pulses.    Pulmonary/Chest: Effort normal and breath sounds normal. No respiratory distress. Pt has no wheezes. Pt has no rales  Abdominal: Soft. Bowel sounds are normal. Pt exhibits no distension and no mass. No tenderness. Pt has no rebound and no guarding.   Musculoskeletal: Normal range of motion. Pt exhibits no edema.   Neurological: Pt is alert and oriented to person, place, and time. Normal reflexes.   Skin: Skin is warm and dry. No rash noted.   Psychiatric: Pt has a normal mood and affect. Behavior is normal. Judgment and thought content normal.             Data:  All laboratory data reviewed             Significant Results:   None  Results for orders placed or performed during the hospital encounter of 04/12/19   CT Abdomen Pelvis w Contrast    Narrative    EXAMINATION: CT ABDOMEN PELVIS W CONTRAST, 4/12/2019 10:22 PM    TECHNIQUE:  Helical CT images from the lung bases through the  symphysis pubis were obtained with IV contrast. Contrast dose:  iopamidol (ISOVUE-370) solution 135 mL      COMPARISON: CT 8/4/2010    HISTORY: Abd pain, gastroenteritis or colitis suspected; possible  crohn's    FINDINGS:    Abdomen and pelvis:   No dilated loops of bowel. The appendix is unremarkable. Moderate  colonic  stool. The terminal ileum is unremarkable. Moderate colonic  stool. No pericolonic inflammatory stranding.    Liver, spleen, adrenal glands, gallbladder and pancreas are  unremarkable. No significant biliary dilatation. Symmetric renal  parenchymal enhancement. No hydronephrosis or hydroureter. Bladder is  partially distended and unremarkable. Uterus is retroflexed. IUD  within the expected location within the endometrial cavity. Adnexa are  within normal limits for age.    The abdominal aorta is within normal limits. No significant  lymphadenopathy of the abdomen or pelvis.    Lung bases: The lung bases are clear. No significant pleural or  pericardial effusion.    Bones and soft tissues: No aggressive osseous lesions.      Impression    IMPRESSION:   No significant inflammatory changes of the bowel. No dilated loops of  bowel. Appendix is within normal limits. Moderate colonic stool.     I have personally reviewed the examination and initial interpretation  and I agree with the findings.    CONNOR JASON MD   UA with Microscopic   Result Value Ref Range    Color Urine Straw     Appearance Urine Clear     Glucose Urine Negative NEG^Negative mg/dL    Bilirubin Urine Negative NEG^Negative    Ketones Urine Negative NEG^Negative mg/dL    Specific Gravity Urine 1.002 (L) 1.003 - 1.035    Blood Urine Trace (A) NEG^Negative    pH Urine 7.0 5.0 - 7.0 pH    Protein Albumin Urine Negative NEG^Negative mg/dL    Urobilinogen mg/dL Normal 0.0 - 2.0 mg/dL    Nitrite Urine Negative NEG^Negative    Leukocyte Esterase Urine Negative NEG^Negative    Source Clean catch urine     WBC Urine <1 0 - 5 /HPF    RBC Urine 0 0 - 2 /HPF   CBC with platelets differential   Result Value Ref Range    WBC 7.8 4.0 - 11.0 10e9/L    RBC Count 4.67 3.8 - 5.2 10e12/L    Hemoglobin 13.9 11.7 - 15.7 g/dL    Hematocrit 42.7 35.0 - 47.0 %    MCV 91 78 - 100 fl    MCH 29.8 26.5 - 33.0 pg    MCHC 32.6 31.5 - 36.5 g/dL    RDW 12.8 10.0 - 15.0 %     Platelet Count 394 150 - 450 10e9/L    Diff Method Automated Method     % Neutrophils 50.3 %    % Lymphocytes 34.4 %    % Monocytes 11.4 %    % Eosinophils 3.3 %    % Basophils 0.3 %    % Immature Granulocytes 0.3 %    Nucleated RBCs 0 0 /100    Absolute Neutrophil 4.0 1.6 - 8.3 10e9/L    Absolute Lymphocytes 2.7 0.8 - 5.3 10e9/L    Absolute Monocytes 0.9 0.0 - 1.3 10e9/L    Absolute Eosinophils 0.3 0.0 - 0.7 10e9/L    Absolute Basophils 0.0 0.0 - 0.2 10e9/L    Abs Immature Granulocytes 0.0 0 - 0.4 10e9/L    Absolute Nucleated RBC 0.0    Comprehensive metabolic panel   Result Value Ref Range    Sodium 138 133 - 144 mmol/L    Potassium 4.0 3.4 - 5.3 mmol/L    Chloride 105 94 - 109 mmol/L    Carbon Dioxide 25 20 - 32 mmol/L    Anion Gap 7 3 - 14 mmol/L    Glucose 125 (H) 70 - 99 mg/dL    Urea Nitrogen 13 7 - 30 mg/dL    Creatinine 0.63 0.52 - 1.04 mg/dL    GFR Estimate >90 >60 mL/min/[1.73_m2]    GFR Estimate If Black >90 >60 mL/min/[1.73_m2]    Calcium 8.7 8.5 - 10.1 mg/dL    Bilirubin Total 0.1 (L) 0.2 - 1.3 mg/dL    Albumin 3.6 3.4 - 5.0 g/dL    Protein Total 7.4 6.8 - 8.8 g/dL    Alkaline Phosphatase 92 40 - 150 U/L    ALT 36 0 - 50 U/L    AST 19 0 - 45 U/L   Lipase   Result Value Ref Range    Lipase 130 73 - 393 U/L   Erythrocyte sedimentation rate auto   Result Value Ref Range    Sed Rate 8 0 - 20 mm/h   CRP inflammation   Result Value Ref Range    CRP Inflammation 6.1 0.0 - 8.0 mg/L   hCG qual urine POCT   Result Value Ref Range    HCG Qual Urine Negative neg    Internal QC OK Yes    ISTAT gases lactate elias POCT   Result Value Ref Range    Ph Venous 7.40 7.32 - 7.43 pH    PCO2 Venous 39 (L) 40 - 50 mm Hg    PO2 Venous 55 (H) 25 - 47 mm Hg    Bicarbonate Venous 24 21 - 28 mmol/L    O2 Sat Venous 88 %    Lactic Acid 1.8 0.7 - 2.1 mmol/L      Recent Results (from the past 48 hour(s))   CT Abdomen Pelvis w Contrast    Narrative    EXAMINATION: CT ABDOMEN PELVIS W CONTRAST, 4/12/2019 10:22 PM    TECHNIQUE:  Helical  CT images from the lung bases through the  symphysis pubis were obtained with IV contrast. Contrast dose:  iopamidol (ISOVUE-370) solution 135 mL      COMPARISON: CT 8/4/2010    HISTORY: Abd pain, gastroenteritis or colitis suspected; possible  crohn's    FINDINGS:    Abdomen and pelvis:   No dilated loops of bowel. The appendix is unremarkable. Moderate  colonic stool. The terminal ileum is unremarkable. Moderate colonic  stool. No pericolonic inflammatory stranding.    Liver, spleen, adrenal glands, gallbladder and pancreas are  unremarkable. No significant biliary dilatation. Symmetric renal  parenchymal enhancement. No hydronephrosis or hydroureter. Bladder is  partially distended and unremarkable. Uterus is retroflexed. IUD  within the expected location within the endometrial cavity. Adnexa are  within normal limits for age.    The abdominal aorta is within normal limits. No significant  lymphadenopathy of the abdomen or pelvis.    Lung bases: The lung bases are clear. No significant pleural or  pericardial effusion.    Bones and soft tissues: No aggressive osseous lesions.      Impression    IMPRESSION:   No significant inflammatory changes of the bowel. No dilated loops of  bowel. Appendix is within normal limits. Moderate colonic stool.     I have personally reviewed the examination and initial interpretation  and I agree with the findings.    CONNOR JASON MD                Pending Results:   Unresulted Labs Ordered in the Past 30 Days of this Admission     Date and Time Order Name Status Description    4/13/2019 1509 Deamidated Gliadin Peptide Ab Iga And Igg In process     4/13/2019 1509 Rheumatoid factor In process     4/13/2019 1509 Anti Nuclear Whit IgG by IFA with Reflex In process                   Discharge Instructions and Follow-Up:     Discharge Procedure Orders   Reason for your hospital stay   Order Comments: Abdominal pain     Activity   Order Comments: Your activity upon discharge: activity  as tolerated     Order Specific Question Answer Comments   Is discharge order? Yes      When to contact your care team   Order Comments: Return to the ED with fever, uncontrolled nausea, vomiting, unrelieved pain, dizziness, chest pain, shortness of breath, loss of consciousness, and any new or concerning symptoms.     Discharge Instructions   Order Comments: You were admtited for abdominal pain, nausea, and vomiting. CT scan of the abdomen showed stool burden otherwise was normal for any acute abnormalities. Blood work up was unremarkable. Urinalysis did no shows any urinary tract infection. You were seen by the gastroenterology group who recommend outpatient endoscopy work up. Referral to GI is in place. Please call them by Monday to set up this appointment. Please continue with home medications as before. Tylenol for pain management. Use Miralax and stool softeners for good bowel regiment. Eat small portion meals. Advance diet as tolerated. Bring stool sample in stool kit as discussed. Follow up with primary as needed.     Full Code     Order Specific Question Answer Comments   Code status determined by: Discussion with patient/legal decision maker      Diet   Order Comments: Follow this diet upon discharge: Advance to a regular diet as tolerated     Order Specific Question Answer Comments   Is discharge order? Yes           Attestation:  Kaela Rojas PA-C

## 2019-04-15 DIAGNOSIS — R10.84 ABDOMINAL PAIN, GENERALIZED: ICD-10-CM

## 2019-04-15 DIAGNOSIS — R19.7 DIARRHEA, UNSPECIFIED TYPE: Primary | ICD-10-CM

## 2019-04-15 LAB
ANA SER QL IF: NEGATIVE
RHEUMATOID FACT SER NEPH-ACNC: <20 IU/ML (ref 0–20)

## 2019-04-16 LAB
GLIADIN IGA SER-ACNC: 2 U/ML
GLIADIN IGG SER-ACNC: 1 U/ML

## 2019-04-18 ENCOUNTER — TELEPHONE (OUTPATIENT)
Dept: FAMILY MEDICINE | Facility: CLINIC | Age: 29
End: 2019-04-18

## 2019-04-18 NOTE — TELEPHONE ENCOUNTER
Pt calling clinic - states she has had urinary burning and frequency in past 2 days. States she is uncertain if she has had fevers, as she takes large quantity of tylenol - 4,000 mg daily for pain. States she notes small streak of blood sometimes when wiping after urinating. Denies pelvic discomfort, illness. She does have appt with Dr Desai next week, but requests sooner appt due to possible UTI symptoms.   Pt offered appt today, but declined. Appt made for Sat AM - pt will call back prn.  Yen Armenta RN  [facility]

## 2019-04-19 LAB — CALPROTECTIN STL-MCNT: 57.7 MG/KG (ref 0–49.9)

## 2019-04-19 NOTE — PROGRESS NOTES
"Cristine is a 28 year old female who presents for follow-up on multiple health issues:     - lost her job because of calling in sick too many days in January 2019  States she is not well and every  organ system is affected.  Wants to go to [facility] to be evaluated.:     Fatigue is the major issue    Nausea, sweating, SOB, chest discomfort after eating    Exercise and walking is limited because of SOB     Urinary symptoms on and off and most recent UC was negative. Has referral to urology     Abdominal pain, recently better but is seeing GI in two weeks for evaluation     Joint pain     Hair loss   Has sought out help in the last three months:   1. Saw urgent care in February.  2. ED visit X 2  3. Hospitalized: 4.12.2019 - 4.14.2019 for abdominal pain [Functional GI disorder].   4. Seen at [facility] 4.20.2019 for UTI symptoms and UC negative.    Generally feels better when not eating. Diet is not restrictive of gluten or dairy.  Avoids corn and sugar.  Will see GI at [place] in May 2, 2019.  PPI is helping symptoms but still with pounding heart rate.  Exertion/fatigue is difficult and limits her acitivity and ability to work:     Wants to go to [facility] for evaluation so she \"will know what is going on\"  PAST MEDICAL HISTORY:  - Hx of Small Bowel Overgrowth:  Has taken rifaximin in the past and it has been helpful.  Past Medical History:   Diagnosis Date     Anxiety      Anxiety disorder      Depressive disorder      Environmental allergies      Hx of eating disorder      IBS (irritable bowel syndrome)      Mild major depression (H)     ANW     OCD (obsessive compulsive disorder)     stable with CBT      Small intestinal bacterial overgrowth 2018    pt reported.      Suicide attempt by multiple drug overdose (H) 4.21.2016     Vitamin D deficiency    Life Style Modifiers:   Tobacco:  reports that she has never smoked. She has never used smokeless tobacco.   Alcohol:  reports that she drinks about 1.5 oz of " alcohol per week.   Drug use:  reports that she does not use drugs.  PAST SURGICAL HISTORY:  Past Surgical History:   Procedure Laterality Date     ABDOMEN SURGERY  01/2012    Exploratory Laparoscopy     biopsy  8-    vulvar     BIOPSY      GI, vulva- OK     COLONOSCOPY  2011     DENTAL SURGERY  2011     ENT SURGERY  2014    intubation & NG tube     ESOPHAGOSCOPY, GASTROSCOPY, DUODENOSCOPY (EGD), COMBINED  10/11/2011    Procedure:COMBINED ESOPHAGOSCOPY, GASTROSCOPY, DUODENOSCOPY (EGD), BIOPSY SINGLE OR MULTIPLE; Surgeon:TALA MONTEMAYOR; Location:[facility]     HC BREATH HYDROGEN TEST  10/4/2011    Procedure:HYDROGEN BREATH TEST; Surgeon:RADHA EPSTEIN; Location:[facility]     LAPAROSCOPY       ORTHOPEDIC SURGERY      left wrist- screw in left scaphiod     TONSILLECTOMY       WRIST SURGERY  2004    screw placed L wrist after fracture   FAMILY HISTORY:  Family History   Problem Relation Age of Onset     Depression Mother      Allergies Mother      Asthma Mother      Thyroid Disease Mother      Eye Disorder Mother      Blood Disease Mother          essential thrombocytosis     Cancer Mother         AML, recent BMT     Anxiety Disorder Mother      Mental Illness Mother         Hoarding- OCD     Depression Sister      Anxiety Disorder Sister      Mental Illness Sister         OCD_ HOarding     Genitourinary Problems Sister      Asthma Sister      Eye Disorder Sister      Allergies Sister      Allergies Sister      Mental Illness Sister         ADHD     Depression Maternal Grandmother      Heart Disease Maternal Grandmother      Thyroid Disease Maternal Grandmother      Depression Other         aunt     Anxiety Disorder Other      Genitourinary Problems Other         aunt     Suicide Other      Heart Disease Maternal Grandfather      C.A.D. Maternal Grandfather      C.A.D. Paternal Grandfather      Cancer Paternal Grandfather      Eye Disorder Father      Mental Illness Father         OCPD?     Allergies Other          "uncles   SOCIAL HISTORY:  Unemployed  Social History     Marital status: Single     Spouse name: N/A     Number of children: N/A     Years of education: N/A   MEDICATIONS:  Current Outpatient Medications   Medication Sig Dispense Refill     Cholecalciferol (VITAMIN D) 2000 UNITS tablet Take 1 tablet by mouth daily Take one tablet daily. (Patient taking differently: Take 4,000 Units by mouth daily ) 90 tablet 3     hydrOXYzine (VISTARIL) 25 MG capsule Take 75 mg by mouth nightly as needed        levonorgestrel (KYLEENA) 19.5 MG IUD 1 each (19.5 mg) by Intrauterine route once for 1 dose (Patient taking differently: 1 each by Intrauterine route once ) 1 each 0     Omega-3 Fatty Acids (OMEGA-3 FISH OIL PO) Take 2 g by mouth 2 times daily        omeprazole 20 MG tablet Take 20 mg by mouth daily       propranolol (INDERAL LA) 80 MG 24 hr capsule Take 80 mg by mouth At Bedtime        propranolol (INDERAL) 40 MG tablet Take 40 mg by mouth 3 times daily       Venlafaxine HCl (EFFEXOR PO) Take 300 mg by mouth daily Take 2 tablets     ALLERGIES:  Codeine; Corn dextrin; Penicillin g; and Sulfa drugs  VITALS:  /77   Pulse 86   Ht 1.651 m (5' 5\")   Wt 106.2 kg (234 lb 3.2 oz)   BMI 38.97 kg/m    PHYSICAL EXAM:  Constitutional: Overweight woman in NAD  Emotionally distress with lack of plan and diagnosis for her illness.    Neck: No thyroidmegaly.   Cardiovascular: regular rate and rhythm, normal S1 and S2, no murmurs, rubs or gallops, peripheral pulses full and symmetric   Respiratory: clear to auscultation, no wheezes or crackles, normal breath sounds.   Musculoskeletal: full range of motion    Skin: facial rash around her mouth   Neurological: normal gait, no tremor.   Total of 25 minutes was spent in direct contact with the patient and >50% of the time in patient education and coordination of care.  Diagnoses and associated orders for this visit:  29 year old with ongoing fatigue, joint stiffness, functional GI " symptoms, SOB and exertional fatigue.   - has a hx of mental health disorders and last hospitalization, I believe was 12/2017.   - I have offered to assist her in going to [place] for evaluation if her insurance will cover.   -  In the mean time we discussed life-style modifiers that might be helpful, I.e. 30 days elimination diet [she typically has felt better when her diet improves]. Encouraged a visit with Dr. Ruvalcaba and [facility] [both too $$$ for her].  -  See urology and GI as she already has referrals.  If unable to be seen at [place] will then make endocrine and rheumatology referrals if needed.

## 2019-04-20 ENCOUNTER — OFFICE VISIT (OUTPATIENT)
Dept: FAMILY MEDICINE | Facility: CLINIC | Age: 29
End: 2019-04-20
Payer: MEDICARE

## 2019-04-20 VITALS
HEART RATE: 69 BPM | DIASTOLIC BLOOD PRESSURE: 82 MMHG | BODY MASS INDEX: 39.15 KG/M2 | SYSTOLIC BLOOD PRESSURE: 129 MMHG | OXYGEN SATURATION: 98 % | RESPIRATION RATE: 16 BRPM | TEMPERATURE: 98.3 F | WEIGHT: 235 LBS | HEIGHT: 65 IN

## 2019-04-20 DIAGNOSIS — K59.00 CONSTIPATION, UNSPECIFIED CONSTIPATION TYPE: ICD-10-CM

## 2019-04-20 DIAGNOSIS — R31.29 MICROSCOPIC HEMATURIA: ICD-10-CM

## 2019-04-20 DIAGNOSIS — R39.9 URINARY SYMPTOM OR SIGN: Primary | ICD-10-CM

## 2019-04-20 LAB
ALBUMIN UR-MCNC: 10 MG/DL
AMORPH CRY #/AREA URNS HPF: ABNORMAL /HPF
APPEARANCE UR: ABNORMAL
BILIRUB UR QL STRIP: NEGATIVE
BILIRUBIN UR: NEGATIVE
BLOOD UR: NEGATIVE
COLOR UR AUTO: YELLOW
GLUCOSE UR STRIP-MCNC: NEGATIVE MG/DL
GLUCOSE URINE: NEGATIVE
HGB UR QL STRIP: NEGATIVE
KETONES UR QL: NEGATIVE
KETONES UR STRIP-MCNC: NEGATIVE MG/DL
LEUKOCYTE ESTERASE UR QL STRIP: NEGATIVE
LEUKOCYTE ESTERASE UR: NEGATIVE
MUCOUS THREADS #/AREA URNS LPF: PRESENT /LPF
NITRATE UR QL: NEGATIVE
NITRITE UR QL STRIP: NEGATIVE
PH UR STRIP: 5.5 PH (ref 5–7)
PH UR STRIP: 5.5 [PH] (ref 4.5–8)
PROTEIN UR: NEGATIVE
RBC #/AREA URNS AUTO: 4 /HPF (ref 0–2)
SOURCE: ABNORMAL
SP GR UR STRIP: >1.035 (ref 1–1.03)
SP GR UR STRIP: >=1.03 (ref 1–1.03)
UROBILINOGEN UR STRIP-ACNC: NORMAL
UROBILINOGEN UR STRIP-MCNC: NORMAL MG/DL (ref 0–2)
WBC #/AREA URNS AUTO: 0 /HPF (ref 0–5)

## 2019-04-20 RX ORDER — CALCIUM CARBONATE 750 MG/1
4 TABLET, CHEWABLE ORAL DAILY
COMMUNITY
End: 2019-10-26

## 2019-04-20 RX ORDER — POLYETHYLENE GLYCOL 3350 17 G/17G
1 POWDER, FOR SOLUTION ORAL DAILY
COMMUNITY
End: 2019-10-26

## 2019-04-20 RX ORDER — SIMETHICONE 125 MG
TABLET,CHEWABLE ORAL 2 TIMES DAILY PRN
COMMUNITY
End: 2019-10-26

## 2019-04-20 RX ORDER — ACETAMINOPHEN 500 MG
500-1000 TABLET ORAL EVERY 4 HOURS PRN
COMMUNITY
End: 2019-10-26

## 2019-04-20 ASSESSMENT — MIFFLIN-ST. JEOR: SCORE: 1786.83

## 2019-04-20 NOTE — PROGRESS NOTES
"Cristine Caballero is a 29 year old woman who is a patient of Dr Desai's. Past history of anxiety, depression, irritable bowel and SIBO. She is here for the following issues:    Abdominal pain  Cristine is a 28 yo woman recently admitted to hospital for abdominal pain. She has history of irritable bowel and SIBO. She reported blood in her stool, pelvic pain and right lower back pain. CT scan showed moderate stool burden but no other worrisome findings. She was instructed to follow up as an outpatient. She has an appt with Dr. Desai, her PCP, next week.      Urinary symptoms  She reports 5-7 days of urinary symptoms which are now improving. She describes several week history of \"difficulty with emptying my bladder\". While hospitalized a bladder scan was performed and there was no residual urine. She feels it is taking longer to empty her bladder. Some sweating, no measured fever.   She has some right lower back pain \"like a pulled muscle\". No kidney stones seen on CT scan.     Dyspareunia  She is sexually active, and describes some pain with intercourse.  Some spotting, with IUD in place      Patient Active Problem List   Diagnosis     Generalized anxiety disorder     OCD (obsessive compulsive disorder)     Moderate recurrent major depression (H)     Environmental allergies     Irritable bowel syndrome     Small intestinal bacterial overgrowth     GBS (group B streptococcus) infection     Abdominal pain       Current Outpatient Medications   Medication Sig Dispense Refill     Cholecalciferol (VITAMIN D) 2000 UNITS tablet Take 1 tablet by mouth daily Take one tablet daily. (Patient taking differently: Take 4,000 Units by mouth daily ) 90 tablet 3     hydrOXYzine (VISTARIL) 25 MG capsule Take 75 mg by mouth nightly as needed        levonorgestrel (KYLEENA) 19.5 MG IUD 1 each (19.5 mg) by Intrauterine route once for 1 dose (Patient taking differently: 1 each by Intrauterine route once ) 1 each 0     Omega-3 " "Fatty Acids (OMEGA-3 FISH OIL PO) Take 2 g by mouth 2 times daily        omeprazole 20 MG tablet Take 20 mg by mouth daily       propranolol (INDERAL LA) 80 MG 24 hr capsule Take 80 mg by mouth At Bedtime        propranolol (INDERAL) 40 MG tablet Take 40 mg by mouth 3 times daily       Venlafaxine HCl (EFFEXOR PO) Take 300 mg by mouth daily Take 2 tablets         Allergies   Allergen Reactions     Codeine Other (See Comments)     Other reaction(s): Tremors     Corn Dextrin      Penicillin G      Sulfa Drugs         EXAM  /82 (BP Location: Right arm, Patient Position: Sitting, Cuff Size: Adult Regular)   Pulse 69   Temp 98.3  F (36.8  C) (Oral)   Resp 16   Ht 1.651 m (5' 5\")   Wt 106.6 kg (235 lb)   SpO2 98%   BMI 39.11 kg/m    Gen: Alert, pleasant, NAD, Overweight  COR: S1,S2, no murmur  Lungs: CTA bilaterally, no rhonchi, wheezes or rales  Abdomen: soft, obese, active BS, some diffuse tenderness with palpation over lower abdominal quadrants, mainly at RLQ, no R/G  Back: no tenderness with palpation over flanks.     Results for orders placed or performed in visit on 04/20/19   Urinalysis ([place])   Result Value Ref Range    Specific Gravity Urine >=1.030 1.005 - 1.030    pH Urine 5.5 4.5 - 8.0    Leukocyte Esterase UR Negative Negative    Nitrite Urine Negative Negative    Protein UR Negative Negative    Glucose Urine Negative Negative    Ketones Urine Negative Negative    Urobilinogen mg/dL 0.2 E.U./dL 0.2 E.U./dL    Bilirubin UR Negative Negative    Blood UR Negative Negative   Routine UA with microscopic - No culture   Result Value Ref Range    Color Urine Yellow     Appearance Urine Cloudy     Glucose Urine Negative NEG^Negative mg/dL    Bilirubin Urine Negative NEG^Negative    Ketones Urine Negative NEG^Negative mg/dL    Specific Gravity Urine >1.035 (H) 1.003 - 1.035    Blood Urine Negative NEG^Negative    pH Urine 5.5 5.0 - 7.0 pH    Protein Albumin Urine 10 (A) NEG^Negative mg/dL    " Urobilinogen mg/dL Normal 0.0 - 2.0 mg/dL    Nitrite Urine Negative NEG^Negative    Leukocyte Esterase Urine Negative NEG^Negative    Source Midstream Urine     WBC Urine 0 0 - 5 /HPF    RBC Urine 4 (H) 0 - 2 /HPF    Mucous Urine Present (A) NEG^Negative /LPF    Amorphous Crystals Moderate (A) NEG^Negative /HPF   Urine Culture Aerobic Bacterial   Result Value Ref Range    Specimen Description Midstream Urine     Special Requests Specimen received in preservative     Culture Micro (A)      10,000 to 50,000 colonies/mL  Streptococcus agalactiae sero group B  Susceptibility testing not routinely done on this organism from the genitourinary tract.   Our antibiogram indicates that Group B streptococci are susceptible to ampicillin,   penicillin, vancomycin and the cephalosporins. Susceptibility testing must be requested   within 5 days.  Group B Streptococcus may be significant in OB patients, however, it is part of the normal   urogenital drew.      Culture Micro       10,000 to 50,000 colonies/mL  mixed urogenital drew  Susceptibility testing not routinely done         Assessment:  (R39.9) Urinary symptom or sign  (primary encounter diagnosis)  Comment: difficulty with emptying her bladder  Plan: Urinalysis ([place]), Urine Culture Aerobic         Bacterial, Routine UA with microscopic - No         culture        Will obtain UA/UCx , no treatment with antibiotics at this time. Discussed link between constipation and bladder symptoms. Recommend daily fiber and stool softener  Addendum: urine culture with mixed bacteria, GBS, insignificant    (R31.29) Microscopic hematuria  Comment: microscopic hematuria on today's urine collection  Plan: UROLOGY ADULT REFERRAL        Refer to urologist for evaluation and treatment. No hx of stones, no gross hematuria    (K59.00) Constipation, unspecified constipation type  Comment: longstanding problem  Plan: recommend daily fiber, miralax or colace    Follow up with Dr. Desai  next week . Total visit time today 25 minutes.  More than 50% of the time was spent in counseling and coordination of care    Zenobia Fregoso MD  Internal Medicine/Pediatrics

## 2019-04-20 NOTE — NURSING NOTE
"29 year old  Chief Complaint   Patient presents with     Urinary Pain     x5-7 days and some pain with intercourse. Difficulty with emptying bladder.     Kidney Problem     seems to be resolving      Back Pain     worsening right lower back pain after yoga. Taking 4,000mg acetaminophen.     Fatigue     pt reports concern for chronic fatigue       Blood pressure 129/82, pulse 69, temperature 98.3  F (36.8  C), temperature source Oral, resp. rate 16, height 1.651 m (5' 5\"), weight 106.6 kg (235 lb), SpO2 98 %, not currently breastfeeding. Body mass index is 39.11 kg/m .  Patient Active Problem List   Diagnosis     Generalized anxiety disorder     OCD (obsessive compulsive disorder)     Moderate recurrent major depression (H)     Environmental allergies     Irritable bowel syndrome     Small intestinal bacterial overgrowth     GBS (group B streptococcus) infection     Abdominal pain       Wt Readings from Last 3 Encounters:   04/20/19 106.6 kg (235 lb)   04/12/19 106.4 kg (234 lb 9.6 oz)   04/11/19 107.2 kg (236 lb 4 oz)     BP Readings from Last 6 Encounters:   04/20/19 129/82   04/13/19 117/80   04/11/19 115/78   04/05/19 117/82   04/02/19 (!) 129/99   01/21/19 121/72       Current Outpatient Medications   Medication     acetaminophen (ACETAMINOPHEN EXTRA STRENGTH) 500 MG tablet     calcium carbonate (TUMS) 500 MG chewable tablet     polyethylene glycol (MIRALAX/GLYCOLAX) packet     simethicone (MYLICON) 125 MG chewable tablet     Cholecalciferol (VITAMIN D) 2000 UNITS tablet     hydrOXYzine (VISTARIL) 25 MG capsule     levonorgestrel (KYLEENA) 19.5 MG IUD     Omega-3 Fatty Acids (OMEGA-3 FISH OIL PO)     omeprazole 20 MG tablet     propranolol (INDERAL LA) 80 MG 24 hr capsule     propranolol (INDERAL) 40 MG tablet     Venlafaxine HCl (EFFEXOR PO)     No current facility-administered medications for this visit.        Social History     Tobacco Use     Smoking status: Never Smoker     Smokeless tobacco: Never Used "   Substance Use Topics     Alcohol use: Yes     Alcohol/week: 1.5 oz     Comment: 1-2 EtOH drinks 2 days per week     Drug use: No       Health Maintenance Due   Topic Date Due     DEPRESSION ACTION PLAN  04/18/2007     DTAP/TDAP/TD IMMUNIZATION (2 - Td) 04/27/2017     INFLUENZA VACCINE (1) 09/01/2018       Lab Results   Component Value Date    PAP NIL 09/01/2016     Declined PHQ-9.    Nohelia Holbrook CMA  April 20, 2019 9:25 AM

## 2019-04-23 LAB
BACTERIA SPEC CULT: ABNORMAL
BACTERIA SPEC CULT: ABNORMAL
Lab: ABNORMAL
SPECIMEN SOURCE: ABNORMAL

## 2019-04-25 ENCOUNTER — OFFICE VISIT (OUTPATIENT)
Dept: FAMILY MEDICINE | Facility: CLINIC | Age: 29
End: 2019-04-25
Attending: FAMILY MEDICINE
Payer: MEDICARE

## 2019-04-25 VITALS
BODY MASS INDEX: 39.02 KG/M2 | HEART RATE: 86 BPM | DIASTOLIC BLOOD PRESSURE: 77 MMHG | WEIGHT: 234.2 LBS | HEIGHT: 65 IN | SYSTOLIC BLOOD PRESSURE: 107 MMHG

## 2019-04-25 DIAGNOSIS — M25.50 MULTIPLE JOINT PAIN: ICD-10-CM

## 2019-04-25 DIAGNOSIS — R53.82 CHRONIC FATIGUE: Primary | ICD-10-CM

## 2019-04-25 DIAGNOSIS — L73.2 AXILLARY HIDRADENITIS SUPPURATIVA: ICD-10-CM

## 2019-04-25 DIAGNOSIS — L30.9 DERMATITIS: ICD-10-CM

## 2019-04-25 DIAGNOSIS — L65.9 HAIR LOSS: ICD-10-CM

## 2019-04-25 PROCEDURE — G0463 HOSPITAL OUTPT CLINIC VISIT: HCPCS | Mod: ZF

## 2019-04-25 ASSESSMENT — MIFFLIN-ST. JEOR: SCORE: 1783.2

## 2019-04-25 ASSESSMENT — ANXIETY QUESTIONNAIRES
7. FEELING AFRAID AS IF SOMETHING AWFUL MIGHT HAPPEN: SEVERAL DAYS
6. BECOMING EASILY ANNOYED OR IRRITABLE: SEVERAL DAYS
3. WORRYING TOO MUCH ABOUT DIFFERENT THINGS: NOT AT ALL
5. BEING SO RESTLESS THAT IT IS HARD TO SIT STILL: NOT AT ALL
2. NOT BEING ABLE TO STOP OR CONTROL WORRYING: NOT AT ALL
1. FEELING NERVOUS, ANXIOUS, OR ON EDGE: SEVERAL DAYS
GAD7 TOTAL SCORE: 3

## 2019-04-25 ASSESSMENT — PATIENT HEALTH QUESTIONNAIRE - PHQ9
SUM OF ALL RESPONSES TO PHQ QUESTIONS 1-9: 13
5. POOR APPETITE OR OVEREATING: NOT AT ALL

## 2019-04-25 ASSESSMENT — PAIN SCALES - GENERAL: PAINLEVEL: NO PAIN (0)

## 2019-04-25 NOTE — LETTER
"  RE: Cristine Caballero  [address]  [facility] [address]     Dear Colleague,    Thank you for referring your patient, Cristine Caballero, to the [facility] at [facility]. Please see a copy of my visit note below.    Cristine is a 28 year old female who presents for follow-up on multiple health issues:     - lost her job because of calling in sick too many days in January 2019  States she is not well and every  organ system is affected.  Wants to go to [facility] to be evaluated.:     Fatigue is the major issue    Nausea, sweating, SOB, chest discomfort after eating    Exercise and walking is limited because of SOB     Urinary symptoms on and off and most recent UC was negative. Has referral to urology     Abdominal pain, recently better but is seeing GI in two weeks for evaluation     Joint pain     Hair loss   Has sought out help in the last three months:   1. Saw urgent care in February.  2. ED visit X 2  3. Hospitalized: 4.12.2019 - 4.14.2019 for abdominal pain [Functional GI disorder].   4. Seen at [facility] 4.20.2019 for UTI symptoms and UC negative.    Generally feels better when not eating. Diet is not restrictive of gluten or dairy.  Avoids corn and sugar.  Will see GI at [facility] in May 2, 2019.  PPI is helping symptoms but still with pounding heart rate.  Exertion/fatigue is difficult and limits her acitivity and ability to work:     Wants to go to [facility] for evaluation so she \"will know what is going on\"  PAST MEDICAL HISTORY:  - Hx of Small Bowel Overgrowth:  Has taken rifaximin in the past and it has been helpful.  Past Medical History:   Diagnosis Date     Anxiety      Anxiety disorder      Depressive disorder      Environmental allergies      Hx of eating disorder      IBS (irritable bowel syndrome)      Mild major depression (H)     ANW     OCD (obsessive compulsive disorder)     stable with CBT      Small intestinal bacterial " overgrowth 2018    pt reported.      Suicide attempt by multiple drug overdose (H) 4.21.2016     Vitamin D deficiency    Life Style Modifiers:   Tobacco:  reports that she has never smoked. She has never used smokeless tobacco.   Alcohol:  reports that she drinks about 1.5 oz of alcohol per week.   Drug use:  reports that she does not use drugs.  PAST SURGICAL HISTORY:  Past Surgical History:   Procedure Laterality Date     ABDOMEN SURGERY  01/2012    Exploratory Laparoscopy     biopsy  8-    vulvar     BIOPSY      GI, vulva- OK     COLONOSCOPY  2011     DENTAL SURGERY  2011     ENT SURGERY  2014    intubation & NG tube     ESOPHAGOSCOPY, GASTROSCOPY, DUODENOSCOPY (EGD), COMBINED  10/11/2011    Procedure:COMBINED ESOPHAGOSCOPY, GASTROSCOPY, DUODENOSCOPY (EGD), BIOPSY SINGLE OR MULTIPLE; Surgeon:TALA MONTEMAYOR; Location:[facility]     HC BREATH HYDROGEN TEST  10/4/2011    Procedure:HYDROGEN BREATH TEST; Surgeon:RADHA EPSTEIN; Location:[facility]     LAPAROSCOPY       ORTHOPEDIC SURGERY      left wrist- screw in left scaphiod     TONSILLECTOMY       WRIST SURGERY  2004    screw placed L wrist after fracture   FAMILY HISTORY:  Family History   Problem Relation Age of Onset     Depression Mother      Allergies Mother      Asthma Mother      Thyroid Disease Mother      Eye Disorder Mother      Blood Disease Mother          essential thrombocytosis     Cancer Mother         AML, recent BMT     Anxiety Disorder Mother      Mental Illness Mother         Hoarding- OCD     Depression Sister      Anxiety Disorder Sister      Mental Illness Sister         OCD_ HOarding     Genitourinary Problems Sister      Asthma Sister      Eye Disorder Sister      Allergies Sister      Allergies Sister      Mental Illness Sister         ADHD     Depression Maternal Grandmother      Heart Disease Maternal Grandmother      Thyroid Disease Maternal Grandmother      Depression Other         aunt     Anxiety Disorder Other      Genitourinary  "Problems Other         aunt     Suicide Other      Heart Disease Maternal Grandfather      C.A.D. Maternal Grandfather      C.A.D. Paternal Grandfather      Cancer Paternal Grandfather      Eye Disorder Father      Mental Illness Father         OCPD?     Allergies Other         uncles   SOCIAL HISTORY:  Unemployed  Social History     Marital status: Single     Spouse name: N/A     Number of children: N/A     Years of education: N/A   MEDICATIONS:  Current Outpatient Medications   Medication Sig Dispense Refill     Cholecalciferol (VITAMIN D) 2000 UNITS tablet Take 1 tablet by mouth daily Take one tablet daily. (Patient taking differently: Take 4,000 Units by mouth daily ) 90 tablet 3     hydrOXYzine (VISTARIL) 25 MG capsule Take 75 mg by mouth nightly as needed        levonorgestrel (KYLEENA) 19.5 MG IUD 1 each (19.5 mg) by Intrauterine route once for 1 dose (Patient taking differently: 1 each by Intrauterine route once ) 1 each 0     Omega-3 Fatty Acids (OMEGA-3 FISH OIL PO) Take 2 g by mouth 2 times daily        omeprazole 20 MG tablet Take 20 mg by mouth daily       propranolol (INDERAL LA) 80 MG 24 hr capsule Take 80 mg by mouth At Bedtime        propranolol (INDERAL) 40 MG tablet Take 40 mg by mouth 3 times daily       Venlafaxine HCl (EFFEXOR PO) Take 300 mg by mouth daily Take 2 tablets     ALLERGIES:  Codeine; Corn dextrin; Penicillin g; and Sulfa drugs  VITALS:  /77   Pulse 86   Ht 1.651 m (5' 5\")   Wt 106.2 kg (234 lb 3.2 oz)   BMI 38.97 kg/m     PHYSICAL EXAM:  Constitutional: Overweight woman in NAD  Emotionally distress with lack of plan and diagnosis for her illness.    Neck: No thyroidmegaly.   Cardiovascular: regular rate and rhythm, normal S1 and S2, no murmurs, rubs or gallops, peripheral pulses full and symmetric   Respiratory: clear to auscultation, no wheezes or crackles, normal breath sounds.   Musculoskeletal: full range of motion    Skin: facial rash around her mouth   Neurological: " normal gait, no tremor.   Total of 25 minutes was spent in direct contact with the patient and >50% of the time in patient education and coordination of care.  Diagnoses and associated orders for this visit:  29 year old with ongoing fatigue, joint stiffness, functional GI symptoms, SOB and exertional fatigue.   - has a hx of mental health disorders and last hospitalization, I believe was 12/2017.   - I have offered to assist her in going to [facility] for evaluation if her insurance will cover.   -  In the mean time we discussed life-style modifiers that might be helpful, I.e. 30 days elimination diet [she typically has felt better when her diet improves]. Encouraged a visit with Dr. Ruvalcaba and [facility] [both too $$$ for her].  -  See urology and GI as she already has referrals.  If unable to be seen at [facility] will then make endocrine and rheumatology referrals if needed.     Again, thank you for allowing me to participate in the care of your patient.      Sincerely,    Natalie Desai MD

## 2019-04-25 NOTE — PATIENT INSTRUCTIONS
- [facility] [place]:  Phone: [phone]        - Endocrinology: Gayle Parr, [facility]: Appointments: [phone]        - Evelyn Ruvalcaba MD/nutritionist.  [email or web address]

## 2019-04-26 ENCOUNTER — TELEPHONE (OUTPATIENT)
Dept: GASTROENTEROLOGY | Facility: CLINIC | Age: 29
End: 2019-04-26

## 2019-04-26 ASSESSMENT — ANXIETY QUESTIONNAIRES: GAD7 TOTAL SCORE: 3

## 2019-04-26 NOTE — TELEPHONE ENCOUNTER
[facility] Call Center    Phone Message    May a detailed message be left on voicemail: yes    Reason for Call: Other: [facility] [place] send [place] a fax stating what records are needed for this patient. Please advise.     Action Taken: Message routed to:  Adult Clinics: Gastroenterology (GI) p [id number]

## 2019-04-29 NOTE — TELEPHONE ENCOUNTER
LPN spoke with release of information at [facility] for a fax number. Request sent for any GI related records.     Carrol Aponte LPN

## 2019-05-02 ENCOUNTER — OFFICE VISIT (OUTPATIENT)
Dept: GASTROENTEROLOGY | Facility: CLINIC | Age: 29
End: 2019-05-02
Payer: MEDICARE

## 2019-05-02 VITALS
OXYGEN SATURATION: 99 % | WEIGHT: 233.9 LBS | BODY MASS INDEX: 38.97 KG/M2 | SYSTOLIC BLOOD PRESSURE: 149 MMHG | DIASTOLIC BLOOD PRESSURE: 95 MMHG | HEART RATE: 114 BPM | HEIGHT: 65 IN

## 2019-05-02 DIAGNOSIS — K92.1 HEMATOCHEZIA: ICD-10-CM

## 2019-05-02 DIAGNOSIS — R10.13 EPIGASTRIC PAIN: Primary | ICD-10-CM

## 2019-05-02 DIAGNOSIS — K21.9 GASTROESOPHAGEAL REFLUX DISEASE, ESOPHAGITIS PRESENCE NOT SPECIFIED: ICD-10-CM

## 2019-05-02 DIAGNOSIS — K62.5 RECTAL BLEEDING: ICD-10-CM

## 2019-05-02 DIAGNOSIS — K58.9 IRRITABLE BOWEL SYNDROME, UNSPECIFIED TYPE: ICD-10-CM

## 2019-05-02 PROCEDURE — 99204 OFFICE O/P NEW MOD 45 MIN: CPT | Performed by: PHYSICIAN ASSISTANT

## 2019-05-02 RX ORDER — OMEPRAZOLE 40 MG/1
40 CAPSULE, DELAYED RELEASE ORAL EVERY MORNING
Qty: 90 CAPSULE | Refills: 0 | Status: SHIPPED | OUTPATIENT
Start: 2019-05-02 | End: 2019-10-26

## 2019-05-02 RX ORDER — HYDROXYZINE HYDROCHLORIDE 25 MG/1
25 TABLET, FILM COATED ORAL DAILY PRN
COMMUNITY
End: 2019-10-26

## 2019-05-02 ASSESSMENT — PAIN SCALES - GENERAL: PAINLEVEL: MODERATE PAIN (4)

## 2019-05-02 ASSESSMENT — MIFFLIN-ST. JEOR: SCORE: 1781.84

## 2019-05-02 NOTE — PROGRESS NOTES
GASTROENTEROLOGY NEW PATIENT CLINIC VISIT    CC/REFERRING MD:    Natalie Desai      REASON FOR CONSULTATION:  Abdominal pain and hematochezia     HISTORY OF PRESENT ILLNESS:    Cristine Caballero is 29 year old female who presents for evaluation of multiple GI concerns.    Patient past medical history is significant for depression, anxiety, OCD, alcohol abuse and borderline personality disorder as well as eating disorder with multiple psychiatric inpatient hospitalizations in the past. She also has a hx of IBS and SIBO which has been treated in the past with xifaxan on multiple occassions.     She presents today with multiple GI concerns.  She was recently admitted to [facility] for abdominal pain, rectal bleeding and diarrhea.  Evaluation during admission includes labs and CT of abdomen pelvis. Her labs revealed a normal CMP, lipase, lactic acid, CRP, CBC and UA. Her CT abdomen pelvis revealed moderate colonic stool but was otherwise unremarkable. She notes that she typically has constipation but recently has been experiencing explosive diarrhea.  She notes in the last 6 months she has had several occasions where she has not been able to make it to the bathroom in time.  This has become even more exacerbated in the last month.  She feels that despite having diarrhea she is not completely evacuating.  She was started on MiraLAX 1 capful a day and she finds that this is helping.  She notes bright red clotting blood and maroon-colored blood off and on the past year.  She has periodic nausea and vomiting.  She is taking omeprazole 20 mg daily and takes extra strength Tums several times a day.  She has difficulty swallowing.  She denies hematemesis.  She notes some weight loss.  She states she has right flank pain right now.  She also notes upper abdominal pain and mouth ulcers.  She denies taking any NSAIDs.  States she has been on Tylenol since discharge from hospital to control her pain.    She also  complains of oral ulcers, increased thirst and urinary frequency.     She had a normal colonoscopy in 2009 with [facility] due to rectal bleeding which was normal. She also had an upper endoscopy in 2011 which revealed gastric antral erythema.     She denies smoking. She drink alcohol occ/socially. She denies any recreational drug use.     Family hx is siginifcant for thyroid disease in her mom and sister.   No family hx of colon cancer.       PREVIOUS ENDOSCOPY:  Upper endoscopy 10/11/2011   Impression:                 - Normal esophagus.  - Gastric mucosal abnormality characterized by antral erythema. This was biopsied from the body and antrum for H.pylori.  - Retained gastric fluid, suspect underlying gastroparesis (though recent gastric emptying study was normal).  - Normal examined duodenum. Biopsied to rule out underlying celiac disease.    SPECIMEN(S):  A: Duodenal biopsy  B: Antral biopsy, body    FINAL DIAGNOSIS:  A.  Small intestine, duodenum, biopsy  -  No histopathologic abnormality  -  No significant inflammation  -  No villous blunting  -  No microorganisms    B.  Stomach, antrum, biopsy:  -  Ulcer (see comment)  -  Superficial acute gastritis  -  Foveolar hyperplasia in non-ulcer mucosa  -  No Helicobacter pylori identified -  No evidence of intestinal metaplasia, dysplasia, or malignancy    COMMENT:  The gastric biopsy shows superficial acute gastritis with ulcer. There is foveolar hyperplasia and lamina propria congestion in the non-ulcer mucosa. The biopsy does not have the background chronic inflammation we typically see with Helicobacter gastritis, nor do we see Helicobacter organisms. The aggregate features raise concern for chemical gastritis, and we would wonder about alcohol or NSAID gastritis. Clinical correlation is suggested.      I have personally reviewed all specimens and or slides, including the listed special stains, and used them with my medical judgement to  determine the final  diagnosis.    Electronically signed out by:    Isael Paulson M.D., Physicians          Colonoscopy in 2009 at age 23 rectal bleeding was found to be normal        PROBLEM LIST  Patient Active Problem List    Diagnosis Date Noted     Abdominal pain 04/13/2019     Priority: Medium     GBS (group B streptococcus) infection 04/04/2019     Priority: Medium     Final urine culture on 4/4/19 grew out <10,000 colonies/mL Streptococcus agalactiae sero group B        Small intestinal bacterial overgrowth 10/03/2017     Priority: Medium     Irritable bowel syndrome 02/14/2014     Priority: Medium     Environmental allergies 01/14/2014     Priority: Medium     Moderate recurrent major depression (H) 02/15/2013     Priority: Medium     Generalized anxiety disorder 08/28/2012     Priority: Medium     OCD (obsessive compulsive disorder) 08/28/2012     Priority: Medium       PERTINENT PAST MEDICAL HISTORY:  (I personally reviewed this history with the patient at today's visit)   Past Medical History:   Diagnosis Date     Anxiety      Anxiety disorder      Depressive disorder      Environmental allergies      Hx of eating disorder      IBS (irritable bowel syndrome)      Mild major depression (H)     ANW     OCD (obsessive compulsive disorder)     stable with CBT      Small intestinal bacterial overgrowth 2018    pt reported.      Suicide attempt by multiple drug overdose (H) 4.21.2016     Vitamin D deficiency          PREVIOUS SURGERIES: (I personally reviewed this history with the patient at today's visit)   Past Surgical History:   Procedure Laterality Date     ABDOMEN SURGERY  01/2012    Exploratory Laparoscopy     biopsy  8-    vulvar     BIOPSY      GI, vulva- OK     COLONOSCOPY  2011     DENTAL SURGERY  2011     ENT SURGERY  2014    intubation & NG tube     ESOPHAGOSCOPY, GASTROSCOPY, DUODENOSCOPY (EGD), COMBINED  10/11/2011    Procedure:COMBINED ESOPHAGOSCOPY, GASTROSCOPY, DUODENOSCOPY (EGD), BIOPSY SINGLE OR  MULTIPLE; Surgeon:TALA MONTEMAYOR; Location:[facility]     HC BREATH HYDROGEN TEST  10/4/2011    Procedure:HYDROGEN BREATH TEST; Surgeon:RADHA EPSTEIN; Location: GI     LAPAROSCOPY       ORTHOPEDIC SURGERY      left wrist- screw in left scaphiod     TONSILLECTOMY       WRIST SURGERY  2004    screw placed L wrist after fracture         ALLERGIES:     Allergies   Allergen Reactions     Codeine Other (See Comments)     Other reaction(s): Tremors     Corn Dextrin      Penicillin G      Sulfa Drugs        PERTINENT MEDICATIONS:    Current Outpatient Medications:      acetaminophen (ACETAMINOPHEN EXTRA STRENGTH) 500 MG tablet, Take 500-1,000 mg by mouth every 4 hours as needed for mild pain, Disp: , Rfl:      calcium carbonate 750 MG CHEW, Take 3 chew tab by mouth 3 times daily , Disp: , Rfl:      Cholecalciferol (VITAMIN D) 2000 UNITS tablet, Take 1 tablet by mouth daily Take one tablet daily. (Patient taking differently: Take 4,000 Units by mouth daily ), Disp: 90 tablet, Rfl: 3     hydrOXYzine (ATARAX) 25 MG tablet, Take 25 mg by mouth daily as needed for itching, Disp: , Rfl:      Omega-3 Fatty Acids (OMEGA-3 FISH OIL PO), Take 2 g by mouth 2 times daily , Disp: , Rfl:      omeprazole 20 MG tablet, Take 20 mg by mouth daily, Disp: , Rfl:      polyethylene glycol (MIRALAX/GLYCOLAX) packet, Take 1 packet by mouth daily, Disp: , Rfl:      simethicone (MYLICON) 125 MG chewable tablet, Take by mouth 2 times daily as needed for intestinal gas 3 tabs, Disp: , Rfl:      Venlafaxine HCl (EFFEXOR PO), Take 300 mg by mouth daily Take 2- 150mg  tablets, Disp: , Rfl:      hydrOXYzine (VISTARIL) 25 MG capsule, Take 75 mg by mouth nightly as needed , Disp: , Rfl:      levonorgestrel (KYLEENA) 19.5 MG IUD, 1 each (19.5 mg) by Intrauterine route once for 1 dose (Patient taking differently: 1 each by Intrauterine route once ), Disp: 1 each, Rfl: 0     propranolol (INDERAL LA) 80 MG 24 hr capsule, Take 80 mg by mouth At Bedtime , Disp: ,  Rfl:      propranolol (INDERAL) 40 MG tablet, Take 40 mg by mouth 3 times daily, Disp: , Rfl:     SOCIAL HISTORY:  Social History     Socioeconomic History     Marital status: Single     Spouse name: Not on file     Number of children: Not on file     Years of education: Not on file     Highest education level: Not on file   Occupational History     Employer: UNEMPLOYED   Social Needs     Financial resource strain: Not on file     Food insecurity:     Worry: Not on file     Inability: Not on file     Transportation needs:     Medical: Not on file     Non-medical: Not on file   Tobacco Use     Smoking status: Never Smoker     Smokeless tobacco: Never Used   Substance and Sexual Activity     Alcohol use: Yes     Alcohol/week: 1.5 oz     Comment: 1-2 EtOH drinks 2 days per week     Drug use: No     Sexual activity: Yes     Partners: Male     Birth control/protection: Condom   Lifestyle     Physical activity:     Days per week: Not on file     Minutes per session: Not on file     Stress: Not on file   Relationships     Social connections:     Talks on phone: Not on file     Gets together: Not on file     Attends [employer] service: Not on file     Active member of club or organization: Not on file     Attends meetings of clubs or organizations: Not on file     Relationship status: Not on file     Intimate partner violence:     Fear of current or ex partner: Not on file     Emotionally abused: Not on file     Physically abused: Not on file     Forced sexual activity: Not on file   Other Topics Concern     Parent/sibling w/ CABG, MI or angioplasty before 65F 55M? No   Social History Narrative    How much exercise per week? 3-4 days    How much calcium per day? Supplement + leafy greens       How much caffeine per day? 1 cup    How much vitamin D per day? supplement    Do you/your family wear seatbelts?  Yes    Do you/your family use safety helmets? Yes    Do you/your family use sunscreen? No    Do you/your family keep  firearms in the home? No    Do you/your family have a smoke detector(s)? Yes        Do you feel safe in your home? Yes    Has anyone ever touched you in an unwanted manner? No     Explain         September 11, 2014 Lita Palacio LPN               FAMILY HISTORY: (I personally reviewed this history with the patient at today's visit)  Family History   Problem Relation Age of Onset     Depression Mother      Allergies Mother      Asthma Mother      Thyroid Disease Mother      Eye Disorder Mother      Blood Disease Mother          essential thrombocytosis     Cancer Mother         AML, recent BMT     Anxiety Disorder Mother      Mental Illness Mother         Hoarding- OCD     Depression Sister      Anxiety Disorder Sister      Mental Illness Sister         OCD_ HOarding     Genitourinary Problems Sister      Asthma Sister      Eye Disorder Sister      Allergies Sister      Allergies Sister      Mental Illness Sister         ADHD     Depression Maternal Grandmother      Heart Disease Maternal Grandmother      Thyroid Disease Maternal Grandmother      Depression Other         aunt     Anxiety Disorder Other      Genitourinary Problems Other         aunt     Suicide Other      Heart Disease Maternal Grandfather      C.A.D. Maternal Grandfather      C.A.D. Paternal Grandfather      Cancer Paternal Grandfather      Eye Disorder Father      Mental Illness Father         OCPD?     Allergies Other         uncles         Review of Systems   Constitutional: Positive for fatigue. Negative for activity change and unexpected weight change.   HENT: Positive for trouble swallowing. Negative for facial swelling.         Oral ulcers   Eyes: Negative for photophobia and visual disturbance.   Respiratory: Negative for choking, chest tightness, shortness of breath and wheezing.    Cardiovascular: Negative for leg swelling.   Gastrointestinal:        See HPI   Endocrine: Negative for cold intolerance and heat intolerance.   Genitourinary:  "Positive for flank pain and frequency.   Musculoskeletal: Negative for neck pain and neck stiffness.   Skin: Negative for pallor and rash.   Neurological: Negative for weakness.   Psychiatric/Behavioral: The patient is nervous/anxious.      PHYSICAL EXAMINATION:  Constitutional: aaox3, cooperative, pleasant, not dyspneic/diaphoretic, no acute distress  Vitals reviewed: BP (!) 149/95 (BP Location: Left arm, Patient Position: Sitting, Cuff Size: Adult Regular)   Pulse 114   Ht 1.651 m (5' 5\")   Wt 106.1 kg (233 lb 14.4 oz)   SpO2 99%   BMI 38.92 kg/m     Wt:   Wt Readings from Last 2 Encounters:   05/02/19 106.1 kg (233 lb 14.4 oz)   04/25/19 106.2 kg (234 lb 3.2 oz)        Physical Exam   Constitutional: She is oriented to person, place, and time. She appears well-developed and well-nourished. No distress.   HENT:   Head: Normocephalic and atraumatic.   Eyes: Pupils are equal, round, and reactive to light. EOM are normal. Right eye exhibits no discharge. Left eye exhibits no discharge. No scleral icterus.   Neck: Normal range of motion.   Cardiovascular: Normal rate, regular rhythm and normal heart sounds.   No murmur heard.  Pulmonary/Chest: Effort normal and breath sounds normal. No respiratory distress. She has no wheezes.   Abdominal: Soft. Bowel sounds are normal. She exhibits no mass. There is tenderness in the epigastric area. There is no rebound.   Musculoskeletal: Normal range of motion.   Neurological: She is alert and oriented to person, place, and time.   Skin: Skin is warm. No pallor.   Psychiatric: Her mood appears anxious.   Nursing note and vitals reviewed.        PERTINENT STUDIES: (I personally reviewed these laboratory studies today)  Most recent CBC:   Recent Labs   Lab Test 04/12/19 2117 04/05/19  1215 11/27/18  1456   WBC 7.8  --  6.8   HGB 13.9 14.5 13.4   HCT 42.7 44.3 40.5     --  351     Most recent hepatic panel:  Recent Labs   Lab Test 04/12/19 2117 04/05/19  1215   ALT 36 " 27.0   AST 19 25.0     Most recent creatinine:  Recent Labs   Lab Test 04/12/19 2117 04/05/19  1215   CR 0.63 0.8       TSH   Date Value Ref Range Status   04/05/2019 1.93 0.40 - 4.00 mU/L Final         RADIOLOGY:    EXAMINATION: CT ABDOMEN PELVIS W CONTRAST, 4/12/2019 10:22 PM     TECHNIQUE:  Helical CT images from the lung bases through the  symphysis pubis were obtained with IV contrast. Contrast dose:  iopamidol (ISOVUE-370) solution 135 mL       COMPARISON: CT 8/4/2010     HISTORY: Abd pain, gastroenteritis or colitis suspected; possible  crohn's     FINDINGS:     Abdomen and pelvis:   No dilated loops of bowel. The appendix is unremarkable. Moderate  colonic stool. The terminal ileum is unremarkable. Moderate colonic  stool. No pericolonic inflammatory stranding.     Liver, spleen, adrenal glands, gallbladder and pancreas are  unremarkable. No significant biliary dilatation. Symmetric renal  parenchymal enhancement. No hydronephrosis or hydroureter. Bladder is  partially distended and unremarkable. Uterus is retroflexed. IUD  within the expected location within the endometrial cavity. Adnexa are  within normal limits for age.     The abdominal aorta is within normal limits. No significant  lymphadenopathy of the abdomen or pelvis.     Lung bases: The lung bases are clear. No significant pleural or  pericardial effusion.     Bones and soft tissues: No aggressive osseous lesions.                                                                      IMPRESSION:   No significant inflammatory changes of the bowel. No dilated loops of  bowel. Appendix is within normal limits. Moderate colonic stool.      I have personally reviewed the examination and initial interpretation  and I agree with the findings.     CONNOR JASON MD          ASSESSMENT/PLAN:    Cristine Caballero is a 29 year old female who presents for evaluation of multiple GI concerns.    Reviewed recent hospitalization and previous GI work-up in 2011  and 2009. She does have a hx of IBS mainly with constipation but she also endorses diarrhea off and on recently. She was recently tested with stool studies during hospitliazation. Found to be negative for C. Diff, O&P. Fecal calprotectin level found to be slightly elevated. We will proceed with a colonoscopy for her hematochezia and elevated fecal calpro. Advised to avoid NSAID's which she is already doing.      She also notes worsening reflux recently despite omeprazole 20mg daily. We will increase this to 40mg daily. Previous egd in 2011 with signs of gastric erythema. Recommended upper endoscopy at this time for her current symptoms.     Epigastric pain  Rectal bleeding  Hematochezia  Irritable bowel syndrome, unspecified type  Gastroesophageal reflux disease, esophagitis presence not specified  - omeprazole (PRILOSEC) 40 MG DR capsule  Dispense: 90 capsule; Refill: 0      Orders Placed This Encounter   Procedures     GASTROENTEROLOGY ADULT REF PROCEDURE ONLY [facility] [phone]; (Dr. Helm with [facility])         Follow up 2-3 weeks after procedure.   As mentioned patient does have hx of IBS and functional abdominal pain. We will continue with above work up to r/o any underlying pathology that could explain her symptoms however must focus on functional abdominal pain as well.   Thank you for this consultation.  It was a pleasure to participate in the care of this patient; please contact us with any further questions.     This note was created with voice recognition software, and while reviewed for accuracy, typos may remain.     Nathaniel Mcclure PA-C  Gastroenterology  [facility]

## 2019-05-02 NOTE — PATIENT INSTRUCTIONS
Please call [phone] to schedule an upper endoscopy and colonoscopy with Dr. Helm.     Follow up 2-3 weeks after procedures.

## 2019-05-02 NOTE — NURSING NOTE
"Cristine Caballero's goals for this visit include:   Chief Complaint   Patient presents with     Consult     Difficulty swallowing; blood in stool- some bright, sometimes dark; Abdominal (2 months) and back pain (had for years and gets worse with SIBO flare up)       She requests these members of her care team be copied on today's visit information: Yes    PCP: Natalie Desai    Referring Provider:  No referring provider defined for this encounter.    BP (!) 149/95 (BP Location: Left arm, Patient Position: Sitting, Cuff Size: Adult Regular)   Pulse 114   Ht 1.651 m (5' 5\")   Wt 106.1 kg (233 lb 14.4 oz)   SpO2 99%   BMI 38.92 kg/m      Do you need any medication refills at today's visit? No    Carrol Aponte LPN      "

## 2019-05-04 ASSESSMENT — ENCOUNTER SYMPTOMS
WHEEZING: 0
ACTIVITY CHANGE: 0
NECK STIFFNESS: 0
NECK PAIN: 0
FATIGUE: 1
FREQUENCY: 1
WEAKNESS: 0
ROS GI COMMENTS: SEE HPI
TROUBLE SWALLOWING: 1
SHORTNESS OF BREATH: 0
CHEST TIGHTNESS: 0
FLANK PAIN: 1
FACIAL SWELLING: 0
CHOKING: 0
NERVOUS/ANXIOUS: 1
PHOTOPHOBIA: 0
UNEXPECTED WEIGHT CHANGE: 0

## 2019-05-07 ENCOUNTER — TRANSFERRED RECORDS (OUTPATIENT)
Dept: HEALTH INFORMATION MANAGEMENT | Facility: CLINIC | Age: 29
End: 2019-05-07

## 2019-05-09 NOTE — PROGRESS NOTES
SUBJECTIVE:   Cristine Caballero is a 29 year old female who presents to clinic today for consultation for a preoperative assessment as requested by Dr. Meek.    Proposed Surgery/ Procedure: Upper endoscopy and colonoscopy  Date of Surgery/ Procedure: 5/15/19  Hospital/Surgical Facility: [facility]  Surgeon: Dr. Perico Meek  Type of Anesthesia Anticipated: MAC     No history of acute coronary syndrome, stroke, coronary artery disease, peripheral artery disease, congestive heart failure, pulmonary embolism or distal vein thrombosis.  Mother has a history of VTE in the setting of essential thrombocytosis   No history of significant anemia. Told she was anemic in high school but treated with oral iron  No history of blood product transfusion.  No history of severe valvular disease or valve replacement.  History of hospitalization to evaluate arrhythmia at age 11 in setting of large amount of weight loss due to anorexia  No history of latex allergy.    With EGD in 2011, concern for possible allergic reaction to fentanyl    Possible allergic reaction to Fentanyl noted today,                             characterized by slight itching yair-orally without                             overt visible rash. No rash noted on                             trunk/back/legs, but given concern for reaction and                             essentially partial treatment for allergic reaction                             with IV Benadryl (given prior to symptoms during                             procedure for sedation) opted to treat as allergic                             reaction.    She is not taking a daily aspirin.  She is not taking an other antiplatelet medications or anticoagulants.  She is not taking NSAIDs currently.    See problem list for active medical problems.  Problems all longstanding and stable, except as noted/documented.  See ROS for pertinent symptoms related to these conditions.                                                                                                                               She is able to perform 4 METS of activity with no shortness of breath nor chest pain.  - able to run for at least 20 minutes    Patient is best characterized as an ASA Class 2 - Mild systemic disease.      Patient Active Problem List   Diagnosis     Generalized anxiety disorder     OCD (obsessive compulsive disorder)     Moderate recurrent major depression (H)     Environmental allergies     Irritable bowel syndrome     Small intestinal bacterial overgrowth     GBS (group B streptococcus) infection     Abdominal pain     Current Outpatient Medications   Medication Sig Dispense Refill     calcium carbonate 750 MG CHEW Take 4 chew tab by mouth daily        Cholecalciferol (VITAMIN D) 2000 UNITS tablet Take 1 tablet by mouth daily Take one tablet daily. (Patient taking differently: Take 4,000 Units by mouth daily ) 90 tablet 3     hydrOXYzine (ATARAX) 25 MG tablet Take 25 mg by mouth daily as needed for itching       Omega-3 Fatty Acids (OMEGA-3 FISH OIL PO) Take 2 g by mouth 2 times daily        omeprazole 20 MG tablet Take 20 mg by mouth daily       polyethylene glycol (MIRALAX/GLYCOLAX) packet Take 1 packet by mouth daily       Venlafaxine HCl (EFFEXOR PO) Take 300 mg by mouth daily Take 2- 150mg  tablets       acetaminophen (ACETAMINOPHEN EXTRA STRENGTH) 500 MG tablet Take 500-1,000 mg by mouth every 4 hours as needed for mild pain       hydrOXYzine (VISTARIL) 25 MG capsule Take 75 mg by mouth nightly as needed        levonorgestrel (KYLEENA) 19.5 MG IUD 1 each (19.5 mg) by Intrauterine route once for 1 dose (Patient taking differently: 1 each by Intrauterine route once ) 1 each 0     omeprazole (PRILOSEC) 40 MG DR capsule Take 1 capsule (40 mg) by mouth every morning On an empty stomach (Patient not taking: Reported on 5/10/2019) 90 capsule 0     propranolol (INDERAL LA) 80 MG 24 hr capsule Take 80 mg by mouth At  Bedtime        propranolol (INDERAL) 40 MG tablet Take 40 mg by mouth 3 times daily       simethicone (MYLICON) 125 MG chewable tablet Take by mouth 2 times daily as needed for intestinal gas 3 tabs       Family History   Problem Relation Age of Onset     Depression Mother      Allergies Mother      Asthma Mother      Thyroid Disease Mother      Eye Disorder Mother      Blood Disease Mother          essential thrombocytosis     Cancer Mother         AML, recent BMT     Anxiety Disorder Mother      Mental Illness Mother         Hoarding- OCD     Depression Sister      Anxiety Disorder Sister      Mental Illness Sister         OCD_ HOarding     Genitourinary Problems Sister      Asthma Sister      Eye Disorder Sister      Allergies Sister      Allergies Sister      Mental Illness Sister         ADHD     Depression Maternal Grandmother      Heart Disease Maternal Grandmother      Thyroid Disease Maternal Grandmother      Depression Other         aunt     Anxiety Disorder Other      Genitourinary Problems Other         aunt     Suicide Other      Heart Disease Maternal Grandfather      C.A.D. Maternal Grandfather      C.A.D. Paternal Grandfather      Cancer Paternal Grandfather      Eye Disorder Father      Mental Illness Father         OCPD?     Allergies Other         uncles       Social History     Tobacco Use     Smoking status: Never Smoker     Smokeless tobacco: Never Used   Substance Use Topics     Alcohol use: Yes     Alcohol/week: 1.5 oz     Comment: 1-2 EtOH drinks 2 days per week     Drug use: No     Medications reviewed and reconciled with patient. I have reviewed the patient's past medical, surgical, family, and social history.    Review of Systems:  Constitutional: no significant weight change, weakness, fatigue, or fevers  Respiratory: no shortness of breath, cough, or wheezing  Cardiovascular: no chest pain, palpitations, or lightheadedness  Otherwise negative in detail    OBJECTIVE:   /89 (BP  "Location: Left arm, Patient Position: Sitting, Cuff Size: Adult Large)   Pulse 129   Temp 99  F (37.2  C) (Oral)   Resp 18   Ht 1.651 m (5' 5\")   Wt 106.1 kg (234 lb)   SpO2 98%   BMI 38.94 kg/m       Pulse Readings from Last 6 Encounters:   05/10/19 129   05/02/19 114   04/25/19 86   04/20/19 69   04/13/19 91   04/11/19 76       Constitutional: well-appearing, appears stated age  Eyes: conjunctivae without erythema, sclera anicteric. Pupils equal, round, and reactive to light.   ENT: oropharynx clear, TM grey bilateral  Cardiac: regular rate and rhythm, normal S1/S2, no murmur/rubs/gallops  Respiratory: lungs clear to auscultation bilaterally, normal work of breathing, no wheezes/crackles  GI: abdomen soft, non-tender, non-distended  Extremities: warm and well perfused, radial pulses 2+ and equal, cap refill brisk.  Lymph: no cervical or supraclavicular lymphadenopathy  Skin: no rashes, lesions, or wounds  Psych: affect is full and appropriate, speech is fluent and non-pressured    Hemoglobin   Date Value Ref Range Status   04/12/2019 13.9 11.7 - 15.7 g/dL Final     Platelet Count   Date Value Ref Range Status   04/12/2019 394 150 - 450 10e9/L Final     Creatinine   Date Value Ref Range Status   04/12/2019 0.63 0.52 - 1.04 mg/dL Final       EKG: appears normal, NSR, normal axis, normal intervals, no acute ST/T changes c/w ischemia, no LVH by voltage criteria, Q waves in leads II and aVF are small and appear non-pathological, unchanged from previous tracing on 4/1/19.    ASSESSMENT:    The proposed surgical procedure is considered LOW risk.    REVISED CARDIAC RISK INDEX  The patient has the following serious cardiovascular risks for perioperative complications such as (MI, PE, VFib and 3  AV Block):  No serious cardiac risks  INTERPRETATION: 0 risks: Class I (very low risk - 0.4% complication rate)    The patient has the following additional risks for perioperative complications:  No identified additional " risks    For RCRI >1%: She is able to perform at least 4 METS of activity with no shortness of breath nor chest pain.    (Z01.818) Preop general physical exam  (primary encounter diagnosis)  Comment:   Plan: EKG 12-lead complete w/read - Clinics    (R00.0) Tachycardia  Comment: Resolved by the time of her ECG. Intermittent palpitations with position changes. Neurologist according to patient recommended she meet with cardiologist. Normal ECG today. Recently normal CBC, CMP, and TSH last month. Will send to cardiology for non-urgent evaluation.   Plan: EKG 12-lead complete w/read - Clinics,         CARDIOLOGY EVAL ADULT REFERRAL    (R10.13) Dyspepsia  Comment: Improved since stopping propranolol. Requesting prescription for lower dose of PPI. Ordered.   Plan: omeprazole 20 MG tablet    RECOMMENDATIONS:      --Patient is to take all scheduled medications on the day of surgery.  --Would avoid fentanyl given concern for possible allergy in the past.    APPROVAL GIVEN to proceed with proposed procedure, without further diagnostic evaluation     Copy of this evaluation report is provided to requesting physician.    Frank Stack  [facility]  05/10/2019, 11:51 AM    [place] Preop Guidelines  Revised Cardiac Risk Index

## 2019-05-10 ENCOUNTER — OFFICE VISIT (OUTPATIENT)
Dept: FAMILY MEDICINE | Facility: CLINIC | Age: 29
End: 2019-05-10
Payer: MEDICARE

## 2019-05-10 VITALS
TEMPERATURE: 99 F | OXYGEN SATURATION: 98 % | DIASTOLIC BLOOD PRESSURE: 89 MMHG | RESPIRATION RATE: 18 BRPM | WEIGHT: 234 LBS | HEART RATE: 129 BPM | BODY MASS INDEX: 38.99 KG/M2 | SYSTOLIC BLOOD PRESSURE: 138 MMHG | HEIGHT: 65 IN

## 2019-05-10 DIAGNOSIS — R00.0 TACHYCARDIA: ICD-10-CM

## 2019-05-10 DIAGNOSIS — R10.13 DYSPEPSIA: ICD-10-CM

## 2019-05-10 DIAGNOSIS — Z01.818 PREOP GENERAL PHYSICAL EXAM: Primary | ICD-10-CM

## 2019-05-10 RX ORDER — NICOTINE POLACRILEX 4 MG/1
20 GUM, CHEWING ORAL DAILY
Qty: 30 TABLET | Refills: 1 | Status: SHIPPED | OUTPATIENT
Start: 2019-05-10 | End: 2019-08-12

## 2019-05-10 ASSESSMENT — MIFFLIN-ST. JEOR: SCORE: 1782.3

## 2019-05-10 NOTE — LETTER
5/10/2019      RE: Cristine Caballero  [address]  [facility] [address]         SUBJECTIVE:   Cristine Caballero is a 29 year old female who presents to clinic today for consultation for a preoperative assessment as requested by Dr. Meek.    Proposed Surgery/ Procedure: Upper endoscopy and colonoscopy  Date of Surgery/ Procedure: 5/15/19  Hospital/Surgical Facility: [facility]  Surgeon: Dr. Perico Meek  Type of Anesthesia Anticipated: MAC     No history of acute coronary syndrome, stroke, coronary artery disease, peripheral artery disease, congestive heart failure, pulmonary embolism or distal vein thrombosis.  Mother has a history of VTE in the setting of essential thrombocytosis   No history of significant anemia. Told she was anemic in high school but treated with oral iron  No history of blood product transfusion.  No history of severe valvular disease or valve replacement.  History of hospitalization to evaluate arrhythmia at age 11 in setting of large amount of weight loss due to anorexia  No history of latex allergy.    With EGD in 2011, concern for possible allergic reaction to fentanyl    Possible allergic reaction to Fentanyl noted today,                             characterized by slight itching yair-orally without                             overt visible rash. No rash noted on                             trunk/back/legs, but given concern for reaction and                             essentially partial treatment for allergic reaction                             with IV Benadryl (given prior to symptoms during                             procedure for sedation) opted to treat as allergic                             reaction.    She is not taking a daily aspirin.  She is not taking an other antiplatelet medications or anticoagulants.  She is not taking NSAIDs currently.    See problem list for active medical problems.  Problems all longstanding and stable, except as noted/documented.   See ROS for pertinent symptoms related to these conditions.                                                                                                                              She is able to perform 4 METS of activity with no shortness of breath nor chest pain.  - able to run for at least 20 minutes    Patient is best characterized as an ASA Class 2 - Mild systemic disease.      Patient Active Problem List   Diagnosis     Generalized anxiety disorder     OCD (obsessive compulsive disorder)     Moderate recurrent major depression (H)     Environmental allergies     Irritable bowel syndrome     Small intestinal bacterial overgrowth     GBS (group B streptococcus) infection     Abdominal pain     Current Outpatient Medications   Medication Sig Dispense Refill     calcium carbonate 750 MG CHEW Take 4 chew tab by mouth daily        Cholecalciferol (VITAMIN D) 2000 UNITS tablet Take 1 tablet by mouth daily Take one tablet daily. (Patient taking differently: Take 4,000 Units by mouth daily ) 90 tablet 3     hydrOXYzine (ATARAX) 25 MG tablet Take 25 mg by mouth daily as needed for itching       Omega-3 Fatty Acids (OMEGA-3 FISH OIL PO) Take 2 g by mouth 2 times daily        omeprazole 20 MG tablet Take 20 mg by mouth daily       polyethylene glycol (MIRALAX/GLYCOLAX) packet Take 1 packet by mouth daily       Venlafaxine HCl (EFFEXOR PO) Take 300 mg by mouth daily Take 2- 150mg  tablets       acetaminophen (ACETAMINOPHEN EXTRA STRENGTH) 500 MG tablet Take 500-1,000 mg by mouth every 4 hours as needed for mild pain       hydrOXYzine (VISTARIL) 25 MG capsule Take 75 mg by mouth nightly as needed        levonorgestrel (KYLEENA) 19.5 MG IUD 1 each (19.5 mg) by Intrauterine route once for 1 dose (Patient taking differently: 1 each by Intrauterine route once ) 1 each 0     omeprazole (PRILOSEC) 40 MG DR capsule Take 1 capsule (40 mg) by mouth every morning On an empty stomach (Patient not taking: Reported on 5/10/2019) 90  capsule 0     propranolol (INDERAL LA) 80 MG 24 hr capsule Take 80 mg by mouth At Bedtime        propranolol (INDERAL) 40 MG tablet Take 40 mg by mouth 3 times daily       simethicone (MYLICON) 125 MG chewable tablet Take by mouth 2 times daily as needed for intestinal gas 3 tabs       Family History   Problem Relation Age of Onset     Depression Mother      Allergies Mother      Asthma Mother      Thyroid Disease Mother      Eye Disorder Mother      Blood Disease Mother          essential thrombocytosis     Cancer Mother         AML, recent BMT     Anxiety Disorder Mother      Mental Illness Mother         Hoarding- OCD     Depression Sister      Anxiety Disorder Sister      Mental Illness Sister         OCD_ HOarding     Genitourinary Problems Sister      Asthma Sister      Eye Disorder Sister      Allergies Sister      Allergies Sister      Mental Illness Sister         ADHD     Depression Maternal Grandmother      Heart Disease Maternal Grandmother      Thyroid Disease Maternal Grandmother      Depression Other         aunt     Anxiety Disorder Other      Genitourinary Problems Other         aunt     Suicide Other      Heart Disease Maternal Grandfather      C.A.D. Maternal Grandfather      C.A.D. Paternal Grandfather      Cancer Paternal Grandfather      Eye Disorder Father      Mental Illness Father         OCPD?     Allergies Other         uncles       Social History     Tobacco Use     Smoking status: Never Smoker     Smokeless tobacco: Never Used   Substance Use Topics     Alcohol use: Yes     Alcohol/week: 1.5 oz     Comment: 1-2 EtOH drinks 2 days per week     Drug use: No     Medications reviewed and reconciled with patient. I have reviewed the patient's past medical, surgical, family, and social history.    Review of Systems:  Constitutional: no significant weight change, weakness, fatigue, or fevers  Respiratory: no shortness of breath, cough, or wheezing  Cardiovascular: no chest pain, palpitations,  "or lightheadedness  Otherwise negative in detail    OBJECTIVE:   /89 (BP Location: Left arm, Patient Position: Sitting, Cuff Size: Adult Large)   Pulse 129   Temp 99  F (37.2  C) (Oral)   Resp 18   Ht 1.651 m (5' 5\")   Wt 106.1 kg (234 lb)   SpO2 98%   BMI 38.94 kg/m        Pulse Readings from Last 6 Encounters:   05/10/19 129   05/02/19 114   04/25/19 86   04/20/19 69   04/13/19 91   04/11/19 76       Constitutional: well-appearing, appears stated age  Eyes: conjunctivae without erythema, sclera anicteric. Pupils equal, round, and reactive to light.   ENT: oropharynx clear, TM grey bilateral  Cardiac: regular rate and rhythm, normal S1/S2, no murmur/rubs/gallops  Respiratory: lungs clear to auscultation bilaterally, normal work of breathing, no wheezes/crackles  GI: abdomen soft, non-tender, non-distended  Extremities: warm and well perfused, radial pulses 2+ and equal, cap refill brisk.  Lymph: no cervical or supraclavicular lymphadenopathy  Skin: no rashes, lesions, or wounds  Psych: affect is full and appropriate, speech is fluent and non-pressured    Hemoglobin   Date Value Ref Range Status   04/12/2019 13.9 11.7 - 15.7 g/dL Final     Platelet Count   Date Value Ref Range Status   04/12/2019 394 150 - 450 10e9/L Final     Creatinine   Date Value Ref Range Status   04/12/2019 0.63 0.52 - 1.04 mg/dL Final       EKG: appears normal, NSR, normal axis, normal intervals, no acute ST/T changes c/w ischemia, no LVH by voltage criteria, Q waves in leads II and aVF are small and appear non-pathological, unchanged from previous tracing on 4/1/19.    ASSESSMENT:    The proposed surgical procedure is considered LOW risk.    REVISED CARDIAC RISK INDEX  The patient has the following serious cardiovascular risks for perioperative complications such as (MI, PE, VFib and 3  AV Block):  No serious cardiac risks  INTERPRETATION: 0 risks: Class I (very low risk - 0.4% complication rate)    The patient has the following " additional risks for perioperative complications:  No identified additional risks    For RCRI >1%: She is able to perform at least 4 METS of activity with no shortness of breath nor chest pain.    (Z01.818) Preop general physical exam  (primary encounter diagnosis)  Comment:   Plan: EKG 12-lead complete w/read - Clinics    (R00.0) Tachycardia  Comment: Resolved by the time of her ECG. Intermittent palpitations with position changes. Neurologist according to patient recommended she meet with cardiologist. Normal ECG today. Recently normal CBC, CMP, and TSH last month. Will send to cardiology for non-urgent evaluation.   Plan: EKG 12-lead complete w/read - Clinics,         CARDIOLOGY EVAL ADULT REFERRAL    (R10.13) Dyspepsia  Comment: Improved since stopping propranolol. Requesting prescription for lower dose of PPI. Ordered.   Plan: omeprazole 20 MG tablet    RECOMMENDATIONS:      --Patient is to take all scheduled medications on the day of surgery.  --Would avoid fentanyl given concern for possible allergy in the past.    APPROVAL GIVEN to proceed with proposed procedure, without further diagnostic evaluation     Copy of this evaluation report is provided to requesting physician.    Frank Stack  [facility]  05/10/2019, 11:51 AM    [place] Preop Guidelines  Revised Cardiac Risk Index    Frank Stack MD

## 2019-05-10 NOTE — NURSING NOTE
"29 year old  Chief Complaint   Patient presents with     Pre-Op Exam       Blood pressure 138/89, pulse 129, temperature 99  F (37.2  C), temperature source Oral, resp. rate 18, height 1.651 m (5' 5\"), weight 106.1 kg (234 lb), SpO2 98 %, not currently breastfeeding. Body mass index is 38.94 kg/m .  Patient Active Problem List   Diagnosis     Generalized anxiety disorder     OCD (obsessive compulsive disorder)     Moderate recurrent major depression (H)     Environmental allergies     Irritable bowel syndrome     Small intestinal bacterial overgrowth     GBS (group B streptococcus) infection     Abdominal pain       Wt Readings from Last 2 Encounters:   05/10/19 106.1 kg (234 lb)   05/02/19 106.1 kg (233 lb 14.4 oz)     BP Readings from Last 3 Encounters:   05/10/19 138/89   05/02/19 (!) 149/95   04/25/19 107/77         Current Outpatient Medications   Medication     calcium carbonate 750 MG CHEW     Cholecalciferol (VITAMIN D) 2000 UNITS tablet     hydrOXYzine (ATARAX) 25 MG tablet     Omega-3 Fatty Acids (OMEGA-3 FISH OIL PO)     omeprazole 20 MG tablet     polyethylene glycol (MIRALAX/GLYCOLAX) packet     Venlafaxine HCl (EFFEXOR PO)     acetaminophen (ACETAMINOPHEN EXTRA STRENGTH) 500 MG tablet     hydrOXYzine (VISTARIL) 25 MG capsule     levonorgestrel (KYLEENA) 19.5 MG IUD     omeprazole (PRILOSEC) 40 MG DR capsule     propranolol (INDERAL LA) 80 MG 24 hr capsule     propranolol (INDERAL) 40 MG tablet     simethicone (MYLICON) 125 MG chewable tablet     No current facility-administered medications for this visit.        Social History     Tobacco Use     Smoking status: Never Smoker     Smokeless tobacco: Never Used   Substance Use Topics     Alcohol use: Yes     Alcohol/week: 1.5 oz     Comment: 1-2 EtOH drinks 2 days per week     Drug use: No       Health Maintenance Due   Topic Date Due     DEPRESSION ACTION PLAN  04/18/2007     DTAP/TDAP/TD IMMUNIZATION (2 - Td) 04/27/2017     MEDICARE ANNUAL WELLNESS " VISIT  09/01/2017       Lab Results   Component Value Date    PAP NIL 09/01/2016         May 10, 2019 11:31 AM

## 2019-05-13 ENCOUNTER — TRANSFERRED RECORDS (OUTPATIENT)
Dept: HEALTH INFORMATION MANAGEMENT | Facility: CLINIC | Age: 29
End: 2019-05-13

## 2019-05-14 ENCOUNTER — ANESTHESIA EVENT (OUTPATIENT)
Dept: SURGERY | Facility: AMBULATORY SURGERY CENTER | Age: 29
End: 2019-05-14

## 2019-05-15 ENCOUNTER — HOSPITAL ENCOUNTER (OUTPATIENT)
Facility: AMBULATORY SURGERY CENTER | Age: 29
Discharge: HOME | End: 2019-05-15
Attending: SPECIALIST | Admitting: SPECIALIST
Payer: MEDICARE

## 2019-05-15 ENCOUNTER — ANESTHESIA (OUTPATIENT)
Dept: SURGERY | Facility: AMBULATORY SURGERY CENTER | Age: 29
End: 2019-05-15
Payer: MEDICARE

## 2019-05-15 VITALS
OXYGEN SATURATION: 98 % | DIASTOLIC BLOOD PRESSURE: 78 MMHG | TEMPERATURE: 97.1 F | RESPIRATION RATE: 16 BRPM | SYSTOLIC BLOOD PRESSURE: 124 MMHG

## 2019-05-15 VITALS — HEART RATE: 68 BPM

## 2019-05-15 LAB
COLONOSCOPY: NORMAL
HCG UR QL: NEGATIVE
UPPER GI ENDOSCOPY: NORMAL

## 2019-05-15 PROCEDURE — 43239 EGD BIOPSY SINGLE/MULTIPLE: CPT

## 2019-05-15 PROCEDURE — G8918 PT W/O PREOP ORDER IV AB PRO: HCPCS

## 2019-05-15 PROCEDURE — G8907 PT DOC NO EVENTS ON DISCHARG: HCPCS

## 2019-05-15 PROCEDURE — 81025 URINE PREGNANCY TEST: CPT | Performed by: ANESTHESIOLOGY

## 2019-05-15 PROCEDURE — 45380 COLONOSCOPY AND BIOPSY: CPT

## 2019-05-15 RX ORDER — SODIUM CHLORIDE, SODIUM LACTATE, POTASSIUM CHLORIDE, CALCIUM CHLORIDE 600; 310; 30; 20 MG/100ML; MG/100ML; MG/100ML; MG/100ML
INJECTION, SOLUTION INTRAVENOUS CONTINUOUS
Status: DISCONTINUED | OUTPATIENT
Start: 2019-05-15 | End: 2019-05-16 | Stop reason: HOSPADM

## 2019-05-15 RX ORDER — DEXAMETHASONE SODIUM PHOSPHATE 4 MG/ML
4 INJECTION, SOLUTION INTRA-ARTICULAR; INTRALESIONAL; INTRAMUSCULAR; INTRAVENOUS; SOFT TISSUE EVERY 10 MIN PRN
Status: DISCONTINUED | OUTPATIENT
Start: 2019-05-15 | End: 2019-05-16 | Stop reason: HOSPADM

## 2019-05-15 RX ORDER — HYDRALAZINE HYDROCHLORIDE 20 MG/ML
2.5-5 INJECTION INTRAMUSCULAR; INTRAVENOUS EVERY 10 MIN PRN
Status: DISCONTINUED | OUTPATIENT
Start: 2019-05-15 | End: 2019-05-16 | Stop reason: HOSPADM

## 2019-05-15 RX ORDER — PROPOFOL 10 MG/ML
INJECTION, EMULSION INTRAVENOUS PRN
Status: DISCONTINUED | OUTPATIENT
Start: 2019-05-15 | End: 2019-05-15

## 2019-05-15 RX ORDER — LIDOCAINE 40 MG/G
CREAM TOPICAL
Status: DISCONTINUED | OUTPATIENT
Start: 2019-05-15 | End: 2019-05-16 | Stop reason: HOSPADM

## 2019-05-15 RX ORDER — ONDANSETRON 2 MG/ML
4 INJECTION INTRAMUSCULAR; INTRAVENOUS
Status: DISCONTINUED | OUTPATIENT
Start: 2019-05-15 | End: 2019-05-16 | Stop reason: HOSPADM

## 2019-05-15 RX ORDER — PHYSOSTIGMINE SALICYLATE 1 MG/ML
1.2 INJECTION INTRAVENOUS
Status: DISCONTINUED | OUTPATIENT
Start: 2019-05-15 | End: 2019-05-16 | Stop reason: HOSPADM

## 2019-05-15 RX ORDER — NALOXONE HYDROCHLORIDE 0.4 MG/ML
.1-.4 INJECTION, SOLUTION INTRAMUSCULAR; INTRAVENOUS; SUBCUTANEOUS
Status: DISCONTINUED | OUTPATIENT
Start: 2019-05-15 | End: 2019-05-16 | Stop reason: HOSPADM

## 2019-05-15 RX ORDER — ONDANSETRON 4 MG/1
4 TABLET, ORALLY DISINTEGRATING ORAL EVERY 30 MIN PRN
Status: DISCONTINUED | OUTPATIENT
Start: 2019-05-15 | End: 2019-05-16 | Stop reason: HOSPADM

## 2019-05-15 RX ORDER — METOPROLOL TARTRATE 1 MG/ML
1-2 INJECTION, SOLUTION INTRAVENOUS EVERY 5 MIN PRN
Status: DISCONTINUED | OUTPATIENT
Start: 2019-05-15 | End: 2019-05-16 | Stop reason: HOSPADM

## 2019-05-15 RX ORDER — LIDOCAINE HYDROCHLORIDE 20 MG/ML
INJECTION, SOLUTION INFILTRATION; PERINEURAL PRN
Status: DISCONTINUED | OUTPATIENT
Start: 2019-05-15 | End: 2019-05-15

## 2019-05-15 RX ORDER — PROPOFOL 10 MG/ML
INJECTION, EMULSION INTRAVENOUS CONTINUOUS PRN
Status: DISCONTINUED | OUTPATIENT
Start: 2019-05-15 | End: 2019-05-15

## 2019-05-15 RX ORDER — ALBUTEROL SULFATE 0.83 MG/ML
2.5 SOLUTION RESPIRATORY (INHALATION) EVERY 4 HOURS PRN
Status: DISCONTINUED | OUTPATIENT
Start: 2019-05-15 | End: 2019-05-16 | Stop reason: HOSPADM

## 2019-05-15 RX ORDER — ONDANSETRON 2 MG/ML
4 INJECTION INTRAMUSCULAR; INTRAVENOUS EVERY 30 MIN PRN
Status: DISCONTINUED | OUTPATIENT
Start: 2019-05-15 | End: 2019-05-16 | Stop reason: HOSPADM

## 2019-05-15 RX ADMIN — PROPOFOL 150 MCG/KG/MIN: 10 INJECTION, EMULSION INTRAVENOUS at 14:24

## 2019-05-15 RX ADMIN — PROPOFOL 50 MG: 10 INJECTION, EMULSION INTRAVENOUS at 14:18

## 2019-05-15 RX ADMIN — SODIUM CHLORIDE, SODIUM LACTATE, POTASSIUM CHLORIDE, CALCIUM CHLORIDE: 600; 310; 30; 20 INJECTION, SOLUTION INTRAVENOUS at 13:16

## 2019-05-15 RX ADMIN — SODIUM CHLORIDE, SODIUM LACTATE, POTASSIUM CHLORIDE, CALCIUM CHLORIDE: 600; 310; 30; 20 INJECTION, SOLUTION INTRAVENOUS at 14:17

## 2019-05-15 RX ADMIN — LIDOCAINE HYDROCHLORIDE 50 MG: 20 INJECTION, SOLUTION INFILTRATION; PERINEURAL at 14:18

## 2019-05-15 RX ADMIN — PROPOFOL 30 MG: 10 INJECTION, EMULSION INTRAVENOUS at 14:24

## 2019-05-15 RX ADMIN — PROPOFOL 30 MG: 10 INJECTION, EMULSION INTRAVENOUS at 14:21

## 2019-05-15 NOTE — ANESTHESIA CARE TRANSFER NOTE
Patient: Cristine Caballero    Procedure(s):  ESOPHAGOGASTRODUODENOSCOPY, WITH CO2 INSUFFLATION  COLONOSCOPY, WITH CO2 INSUFFLATION  Esophagogastroduodenoscopy, With Biopsy  Colonoscopy, With Polypectomy And Biopsy    Diagnosis: MAC diagnostic EGD/Colon-[facility] [address] [facility], Patient called Dr. Meek's office for sooner appt  Diagnosis Additional Information: No value filed.    Anesthesia Type:   No value filed.     Note:  Airway :Room Air  Patient transferred to:Phase II  Comments: Awake, comfortable, sats 99%< Report to RN.Handoff Report: Identifed the Patient, Identified the Reponsible Provider, Reviewed the pertinent medical history, Discussed the surgical course, Reviewed Intra-OP anesthesia mangement and issues during anesthesia, Set expectations for post-procedure period and Allowed opportunity for questions and acknowledgement of understanding      Vitals: (Last set prior to Anesthesia Care Transfer)    CRNA VITALS  5/15/2019 1441 - 5/15/2019 1511      5/15/2019             Pulse:  16  (Abnormal)     Ht Rate:  77    SpO2:  98 %                Electronically Signed By: LAURIE Chen CRNA  May 15, 2019  3:11 PM

## 2019-05-15 NOTE — H&P
Pre-Endoscopy History and Physical     Cristine Caballero MRN# [id number]   YOB: 1990 Age: 29 year old     Date of Procedure: 5/15/2019  Primary care provider: Natalie Desai  Type of Endoscopy: Colonoscopy with possible biopsy, possible polypectomy and Gastroscopy with possible biopsy, possible dilation  Reason for Procedure: hematochezia, abdominal pain, nausea and vomiting, difficulty swallowing, increased fecal calprotectin. See GI consultation note.   Type of Anesthesia Anticipated: Conscious Sedation    HPI:    Cristine is a 29 year old female who will be undergoing the above procedure.      A history and physical has been performed. The patient's medications and allergies have been reviewed. The risks and benefits of the procedure and the sedation options and risks were discussed with the patient.  All questions were answered and informed consent was obtained.      She denies a personal or family history of anesthesia complications or bleeding disorders.     Patient Active Problem List   Diagnosis     Generalized anxiety disorder     Moderate recurrent major depression (H)     Environmental allergies     Irritable bowel syndrome     Small intestinal bacterial overgrowth     GBS (group B streptococcus) infection     Abdominal pain     OCD (obsessive compulsive disorder)        Past Medical History:   Diagnosis Date     Anxiety disorder      Depressive disorder      Environmental allergies      Gastroesophageal reflux disease      Hx of eating disorder      Hypertension     elevated since propanolol discontinued     IBS (irritable bowel syndrome)      OCD (obsessive compulsive disorder)     stable with CBT      Small intestinal bacterial overgrowth 2018    pt reported.      Suicide attempt by multiple drug overdose (H) 4.21.2016     Vitamin D deficiency         Past Surgical History:   Procedure Laterality Date     ABDOMEN SURGERY  01/2012    Exploratory Laparoscopy     biopsy  8-     vulvar     BIOPSY      GI, vulva- OK     COLONOSCOPY  2011     DENTAL SURGERY  2011     ENT SURGERY  2014    intubation & NG tube     ESOPHAGOSCOPY, GASTROSCOPY, DUODENOSCOPY (EGD), COMBINED  10/11/2011    Procedure:COMBINED ESOPHAGOSCOPY, GASTROSCOPY, DUODENOSCOPY (EGD), BIOPSY SINGLE OR MULTIPLE; Surgeon:TALA MONTEMAYOR; Location:[facility]     HC BREATH HYDROGEN TEST  10/4/2011    Procedure:HYDROGEN BREATH TEST; Surgeon:RADHA EPSTEIN; Location:[facility]     LAPAROSCOPY       ORTHOPEDIC SURGERY      left wrist- screw in left scaphiod     TONSILLECTOMY       WRIST SURGERY  2004    screw placed L wrist after fracture       Social History     Tobacco Use     Smoking status: Never Smoker     Smokeless tobacco: Never Used   Substance Use Topics     Alcohol use: Yes     Alcohol/week: 1.5 oz     Comment: 1-2 EtOH drinks 2 days per week       Family History   Problem Relation Age of Onset     Depression Mother      Allergies Mother      Asthma Mother      Thyroid Disease Mother      Eye Disorder Mother      Blood Disease Mother          essential thrombocytosis     Cancer Mother         AML, recent BMT     Anxiety Disorder Mother      Mental Illness Mother         Hoarding- OCD     Depression Sister      Anxiety Disorder Sister      Mental Illness Sister         OCD_ HOarding     Genitourinary Problems Sister      Asthma Sister      Eye Disorder Sister      Allergies Sister      Allergies Sister      Mental Illness Sister         ADHD     Depression Maternal Grandmother      Heart Disease Maternal Grandmother      Thyroid Disease Maternal Grandmother      Depression Other         aunt     Anxiety Disorder Other      Genitourinary Problems Other         aunt     Suicide Other      Heart Disease Maternal Grandfather      C.A.D. Maternal Grandfather      C.A.D. Paternal Grandfather      Cancer Paternal Grandfather      Eye Disorder Father      Mental Illness Father         OCPD?     Allergies Other         uncles       Prior to  Admission medications    Medication Sig Start Date End Date Taking? Authorizing Provider   acetaminophen (ACETAMINOPHEN EXTRA STRENGTH) 500 MG tablet Take 500-1,000 mg by mouth every 4 hours as needed for mild pain   Yes Reported, Patient   calcium carbonate 750 MG CHEW Take 4 chew tab by mouth daily    Yes Reported, Patient   Cholecalciferol (VITAMIN D) 2000 UNITS tablet Take 1 tablet by mouth daily Take one tablet daily.  Patient taking differently: Take 4,000 Units by mouth daily  9/11/14  Yes Natalie Desai MD   hydrOXYzine (ATARAX) 25 MG tablet Take 25 mg by mouth daily as needed for itching   Yes Reported, Patient   hydrOXYzine (VISTARIL) 25 MG capsule Take 75 mg by mouth nightly as needed  1/9/19  Yes Reported, Patient   Omega-3 Fatty Acids (OMEGA-3 FISH OIL PO) Take 2 g by mouth 2 times daily    Yes Reported, Patient   omeprazole 20 MG tablet Take 20 mg by mouth daily   Yes Reported, Patient   propranolol (INDERAL LA) 80 MG 24 hr capsule Take 80 mg by mouth At Bedtime    Yes Reported, Patient   propranolol (INDERAL) 40 MG tablet Take 40 mg by mouth 3 times daily   Yes Reported, Patient   simethicone (MYLICON) 125 MG chewable tablet Take by mouth 2 times daily as needed for intestinal gas 3 tabs   Yes Reported, Patient   Venlafaxine HCl (EFFEXOR PO) Take 300 mg by mouth daily Take 2- 150mg  tablets   Yes Reported, Patient   levonorgestrel (KYLEENA) 19.5 MG IUD 1 each (19.5 mg) by Intrauterine route once for 1 dose  Patient taking differently: 1 each by Intrauterine route once  12/7/18 4/20/19  Adrianne Mims APRN CNP   omeprazole (PRILOSEC) 40 MG DR capsule Take 1 capsule (40 mg) by mouth every morning On an empty stomach  Patient not taking: Reported on 5/10/2019 5/2/19   Nathaniel Mcclure PA-C   omeprazole 20 MG tablet Take 1 tablet (20 mg) by mouth daily 5/10/19   Frank Stack MD   polyethylene glycol (MIRALAX/GLYCOLAX) packet Take 1 packet by mouth daily    Reported, Patient  "      Allergies   Allergen Reactions     Betadex Nausea and Vomiting     Codeine Other (See Comments)     Other reaction(s): Tremors     Corn Dextrin      Penicillin G      Sulfa Drugs      Fentanyl Rash     Shauna oral rash after fentanyl during egd in 2011        REVIEW OF SYSTEMS:   5 point ROS negative except as noted above in HPI, including Gen., Resp., CV, GI &  system review.    PHYSICAL EXAM:   /90   Temp 97.2  F (36.2  C) (Temporal)   Resp 18   SpO2 99%  Estimated body mass index is 38.94 kg/m  as calculated from the following:    Height as of 5/10/19: 1.651 m (5' 5\").    Weight as of 5/10/19: 106.1 kg (234 lb).   GENERAL APPEARANCE: alert, and oriented  MENTAL STATUS: alert  AIRWAY EXAM: Mallampatti Class II (visualization of the soft palate, fauces, and uvula)  RESP: lungs clear to auscultation - no rales, rhonchi or wheezes  CV: regular rates and rhythm  DIAGNOSTICS:    Not indicated    IMPRESSION   ASA Class 2 - Mild systemic disease    PLAN:   Plan for Colonoscopy with possible biopsy, possible polypectomy and Gastroscopy with possible biopsy, possible dilation. We discussed the risks, benefits and alternatives and the patient wished to proceed.    The above has been forwarded to the consulting provider.      Signed Electronically by: Perico Meek  May 15, 2019          "

## 2019-05-15 NOTE — ANESTHESIA PREPROCEDURE EVALUATION
Anesthesia Pre-Procedure Evaluation    Patient: Cristine Caballero   MRN:     [id number] Gender:   female   Age:    29 year old :      1990        Preoperative Diagnosis: MAC diagnostic EGD/Colon-[facility] [address] in hospitals, Patient called Dr. Meek's office for sooner appt   Procedure(s):  ESOPHAGOGASTRODUODENOSCOPY, WITH CO2 INSUFFLATION  COLONOSCOPY, WITH CO2 INSUFFLATION     Past Medical History:   Diagnosis Date     Anxiety disorder      Depressive disorder      Environmental allergies      Gastroesophageal reflux disease      Hx of eating disorder      Hypertension     elevated since propanolol discontinued     IBS (irritable bowel syndrome)      OCD (obsessive compulsive disorder)     stable with CBT      Small intestinal bacterial overgrowth 2018    pt reported.      Suicide attempt by multiple drug overdose (H) 4..     Vitamin D deficiency       Past Surgical History:   Procedure Laterality Date     ABDOMEN SURGERY  2012    Exploratory Laparoscopy     biopsy  2010    vulvar     BIOPSY      GI, vulva- OK     COLONOSCOPY       DENTAL SURGERY       ENT SURGERY      intubation & NG tube     ESOPHAGOSCOPY, GASTROSCOPY, DUODENOSCOPY (EGD), COMBINED  10/11/2011    Procedure:COMBINED ESOPHAGOSCOPY, GASTROSCOPY, DUODENOSCOPY (EGD), BIOPSY SINGLE OR MULTIPLE; Surgeon:TALA MONTEMAYOR; Location:[facility]     HC BREATH HYDROGEN TEST  10/4/2011    Procedure:HYDROGEN BREATH TEST; Surgeon:RADHA EPSTEIN; Location:[facility]     LAPAROSCOPY       ORTHOPEDIC SURGERY      left wrist- screw in left scaphiod     TONSILLECTOMY       WRIST SURGERY  2004    screw placed L wrist after fracture          Anesthesia Evaluation     .             ROS/MED HX    ENT/Pulmonary:  - neg pulmonary ROS     Neurologic:  - neg neurologic ROS     Cardiovascular:  - neg cardiovascular ROS   (+) hypertension----. : . . . :. .       METS/Exercise Tolerance:     Hematologic:  - neg hematologic  ROS       Musculoskeletal:  - neg  "musculoskeletal ROS       GI/Hepatic:  - neg GI/hepatic ROS   (+) GERD       Renal/Genitourinary:  - ROS Renal section negative       Endo:  - neg endo ROS   (+) Obesity, .      Psychiatric:  - neg psychiatric ROS   (+) psychiatric history anxiety and depression      Infectious Disease:  - neg infectious disease ROS       Malignancy:      - no malignancy   Other: Comment: Etoh abuse   - neg other ROS                     PHYSICAL EXAM:   Mental Status/Neuro: A/A/O   Airway: Facies: Feasible  Mallampati: I  Mouth/Opening: Full  TM distance: > 6 cm  Neck ROM: Full   Respiratory: Auscultation: CTAB     Resp. Rate: Normal     Resp. Effort: Normal      CV: Rhythm: Regular  Rate: Age appropriate  Heart: Normal Sounds   Comments:      Dental: Normal                  Lab Results   Component Value Date    WBC 7.8 04/12/2019    HGB 13.9 04/12/2019    HCT 42.7 04/12/2019     04/12/2019    CRP 6.1 04/12/2019    SED 8 04/12/2019     04/12/2019    POTASSIUM 4.0 04/12/2019    CHLORIDE 105 04/12/2019    CO2 25 04/12/2019    BUN 13 04/12/2019    CR 0.63 04/12/2019     (H) 04/12/2019    MODESTO 8.7 04/12/2019    ALBUMIN 3.6 04/12/2019    PROTTOTAL 7.4 04/12/2019    ALT 36 04/12/2019    AST 19 04/12/2019    GGT 11 01/12/2009    ALKPHOS 92 04/12/2019    BILITOTAL 0.1 (L) 04/12/2019    LIPASE 130 04/12/2019    TSH 1.93 04/05/2019    T4 1.23 11/27/2018    HCG Negative 05/15/2019    HCGS Negative 01/07/2011       Preop Vitals  BP Readings from Last 3 Encounters:   05/15/19 146/90   05/10/19 138/89   05/02/19 (!) 149/95    Pulse Readings from Last 3 Encounters:   05/10/19 129   05/02/19 114   04/25/19 86      Resp Readings from Last 3 Encounters:   05/15/19 18   05/10/19 18   04/20/19 16    SpO2 Readings from Last 3 Encounters:   05/15/19 99%   05/10/19 98%   05/02/19 99%      Temp Readings from Last 1 Encounters:   05/15/19 36.2  C (97.2  F) (Temporal)    Ht Readings from Last 1 Encounters:   05/10/19 1.651 m (5' 5\")    " "  Wt Readings from Last 1 Encounters:   05/10/19 106.1 kg (234 lb)    Estimated body mass index is 38.94 kg/m  as calculated from the following:    Height as of 5/10/19: 1.651 m (5' 5\").    Weight as of 5/10/19: 106.1 kg (234 lb).     LDA:            Assessment:   ASA SCORE: 2    NPO Status: > 6 hours since completed Solid Foods   Documentation: H&P complete; Preop Testing complete; Consents complete   Proceeding: Proceed without further delay  Tobacco Use:  NO Active use of Tobacco/UNKNOWN Tobacco use status     Plan:   Anes. Type:  MAC   Pre-Induction: Midazolam IV   Induction:  IV (Standard)   Airway: Native Airway   Access/Monitoring: PIV   Maintenance: Propofol; IV   Emergence: Procedure Site   Logistics: Same Day Surgery     Postop Pain/Sedation Strategy:  Standard-Options: Opioids PRN     PONV Management:  Adult Risk Factors: Female, Non-Smoker, Postop Opioids  Prevention: Propofol Infusion; Ondansetron     CONSENT: Direct conversation   Plan and risks discussed with: Patient   Blood Products: Consent Deferred (Minimal Blood Loss)                         Tang Byrnes MD  "

## 2019-05-15 NOTE — ANESTHESIA POSTPROCEDURE EVALUATION
Anesthesia POST Procedure Evaluation    Patient: Cristine Caballero   MRN:     [id number] Gender:   female   Age:    29 year old :      1990        Preoperative Diagnosis: [facility] Patient called Dr. Meek's office for sooner appt   Procedure(s):  ESOPHAGOGASTRODUODENOSCOPY, WITH CO2 INSUFFLATION  COLONOSCOPY, WITH CO2 INSUFFLATION  Esophagogastroduodenoscopy, With Biopsy  Colonoscopy, With Polypectomy And Biopsy   Postop Comments: No value filed.       Anesthesia Type:  MAC  No value filed.    Reportable Event: NO     PAIN: Uncomplicated   Sign Out status: Comfortable, Well controlled pain     PONV: No PONV   Sign Out status:  No Nausea or Vomiting     Neuro/Psych: Uneventful perioperative course   Sign Out Status: Preoperative baseline; Age appropriate mentation     Airway/Resp.: Uneventful perioperative course   Sign Out Status: Non labored breathing, age appropriate RR; Resp. Status within EXPECTED Parameters     CV: Uneventful perioperative course   Sign Out status: Appropriate BP and perfusion indices; Appropriate HR/Rhythm     Disposition:   Sign Out in:  PACU  Disposition:  Phase II; Home  Recovery Course: Uneventful  Follow-Up: Not required           Last Anesthesia Record Vitals:  CRNA VITALS  5/15/2019 1441 - 5/15/2019 1520      5/15/2019             Pulse:  16  (Abnormal)     Ht Rate:  77    SpO2:  98 %          Last PACU Vitals:  Vitals Value Taken Time   /84 5/15/2019  3:11 PM   Temp 36.1  C (97  F) 5/15/2019  3:11 PM   Pulse     Resp 16 5/15/2019  3:11 PM   SpO2 99 % 5/15/2019  3:11 PM   Temp src     NIBP     Pulse 16 5/15/2019  3:06 PM   SpO2 98 % 5/15/2019  3:06 PM   Resp     Temp     Ht Rate 77 5/15/2019  3:06 PM   Temp 2           Electronically Signed By: Tang Byrnes MD, May 15, 2019, 3:20 PM

## 2019-05-16 ENCOUNTER — TELEPHONE (OUTPATIENT)
Dept: GASTROENTEROLOGY | Facility: CLINIC | Age: 29
End: 2019-05-16

## 2019-05-16 NOTE — TELEPHONE ENCOUNTER
[facility] Call Center    Phone Message    May a detailed message be left on voicemail: yes    Reason for Call: Patient is requesting a call to discuss if she should continue medication and when she should follow up.     Action Taken: Message routed to:  Adult Clinics: Gastroenterology (GI) p [phone]

## 2019-05-16 NOTE — TELEPHONE ENCOUNTER
"LPN returned patients call and notified her that Karol's previous OV note stated that patient should follow up in clinic 2-3 weeks after procedures. Patient scheduled f/u for 19. Patient also questioned if she needed to continue to take the miralax and PPI. Patient stated \"I haven't taken the medications in about 3 days and I really don't want to take the PPI because long term effects of it can be detromental, but I'm back to having symptoms. When I had the procedures, Dr. Meek stated there wasn't anything visual, but that my previous procedures also didn't show anything until the biopsy results came back.\" LPN stated \"we don't have the biopsy results back yet, they are in process. As far as the medications go, it really is your choice if you want to continue the medications. Were you having any symptoms while taking the medications?\" Patient stated \"no, I felt good and wasn't having anything. I'm really just getting tired of [facility].\" LPN stated that if all symptoms had stopped while taking the PPI that it would be a good idea to continue the medications for her comfort, but that it is patients choice. Patient stated \"I think I am just going to stop taking the medications; I haven't  yet from the symptoms after this long. If I don't find out anything from this time, I'm just done. Have a good day.\" Patient ended call. Closing encounter at this time.     Carrol Aponte, [place]    "

## 2019-05-20 LAB — COPATH REPORT: NORMAL

## 2019-07-05 ENCOUNTER — APPOINTMENT (OUTPATIENT)
Dept: ULTRASOUND IMAGING | Facility: CLINIC | Age: 29
End: 2019-07-05
Attending: EMERGENCY MEDICINE
Payer: COMMERCIAL

## 2019-07-05 ENCOUNTER — HOSPITAL ENCOUNTER (EMERGENCY)
Facility: CLINIC | Age: 29
Discharge: HOME | End: 2019-07-05
Attending: EMERGENCY MEDICINE | Admitting: EMERGENCY MEDICINE
Payer: COMMERCIAL

## 2019-07-05 VITALS
OXYGEN SATURATION: 97 % | HEART RATE: 98 BPM | TEMPERATURE: 98.4 F | WEIGHT: 205 LBS | DIASTOLIC BLOOD PRESSURE: 87 MMHG | HEIGHT: 65 IN | RESPIRATION RATE: 16 BRPM | SYSTOLIC BLOOD PRESSURE: 126 MMHG | BODY MASS INDEX: 34.16 KG/M2

## 2019-07-05 DIAGNOSIS — R10.2 PELVIC PAIN IN FEMALE: ICD-10-CM

## 2019-07-05 LAB
ALBUMIN SERPL-MCNC: 3.9 G/DL (ref 3.4–5)
ALBUMIN UR-MCNC: NEGATIVE MG/DL
ALP SERPL-CCNC: 58 U/L (ref 40–150)
ALT SERPL W P-5'-P-CCNC: 26 U/L (ref 0–50)
ANION GAP SERPL CALCULATED.3IONS-SCNC: 7 MMOL/L (ref 3–14)
APPEARANCE UR: CLEAR
AST SERPL W P-5'-P-CCNC: 18 U/L (ref 0–45)
BASOPHILS # BLD AUTO: 0 10E9/L (ref 0–0.2)
BASOPHILS NFR BLD AUTO: 0.6 %
BILIRUB SERPL-MCNC: 0.2 MG/DL (ref 0.2–1.3)
BILIRUB UR QL STRIP: NEGATIVE
BUN SERPL-MCNC: 13 MG/DL (ref 7–30)
CALCIUM SERPL-MCNC: 9 MG/DL (ref 8.5–10.1)
CHLORIDE SERPL-SCNC: 108 MMOL/L (ref 94–109)
CO2 SERPL-SCNC: 24 MMOL/L (ref 20–32)
COLOR UR AUTO: ABNORMAL
CREAT SERPL-MCNC: 0.65 MG/DL (ref 0.52–1.04)
DIFFERENTIAL METHOD BLD: NORMAL
EOSINOPHIL # BLD AUTO: 0.1 10E9/L (ref 0–0.7)
EOSINOPHIL NFR BLD AUTO: 1.9 %
ERYTHROCYTE [DISTWIDTH] IN BLOOD BY AUTOMATED COUNT: 12.6 % (ref 10–15)
GFR SERPL CREATININE-BSD FRML MDRD: >90 ML/MIN/{1.73_M2}
GLUCOSE SERPL-MCNC: 84 MG/DL (ref 70–99)
GLUCOSE UR STRIP-MCNC: NEGATIVE MG/DL
HCG UR QL: NEGATIVE
HCT VFR BLD AUTO: 41.8 % (ref 35–47)
HGB BLD-MCNC: 13.5 G/DL (ref 11.7–15.7)
HGB UR QL STRIP: NEGATIVE
IMM GRANULOCYTES # BLD: 0 10E9/L (ref 0–0.4)
IMM GRANULOCYTES NFR BLD: 0.2 %
KETONES UR STRIP-MCNC: NEGATIVE MG/DL
LACTATE BLD-SCNC: 0.9 MMOL/L (ref 0.7–2)
LEUKOCYTE ESTERASE UR QL STRIP: NEGATIVE
LIPASE SERPL-CCNC: 111 U/L (ref 73–393)
LYMPHOCYTES # BLD AUTO: 1.8 10E9/L (ref 0.8–5.3)
LYMPHOCYTES NFR BLD AUTO: 29.4 %
MCH RBC QN AUTO: 29.3 PG (ref 26.5–33)
MCHC RBC AUTO-ENTMCNC: 32.3 G/DL (ref 31.5–36.5)
MCV RBC AUTO: 91 FL (ref 78–100)
MONOCYTES # BLD AUTO: 0.8 10E9/L (ref 0–1.3)
MONOCYTES NFR BLD AUTO: 12.3 %
NEUTROPHILS # BLD AUTO: 3.4 10E9/L (ref 1.6–8.3)
NEUTROPHILS NFR BLD AUTO: 55.6 %
NITRATE UR QL: NEGATIVE
NRBC # BLD AUTO: 0 10*3/UL
NRBC BLD AUTO-RTO: 0 /100
PH UR STRIP: 6 PH (ref 5–7)
PLATELET # BLD AUTO: 349 10E9/L (ref 150–450)
POTASSIUM SERPL-SCNC: 4.2 MMOL/L (ref 3.4–5.3)
PROT SERPL-MCNC: 7.3 G/DL (ref 6.8–8.8)
RBC # BLD AUTO: 4.61 10E12/L (ref 3.8–5.2)
SODIUM SERPL-SCNC: 139 MMOL/L (ref 133–144)
SOURCE: ABNORMAL
SP GR UR STRIP: 1 (ref 1–1.03)
SPECIMEN SOURCE: NORMAL
UROBILINOGEN UR STRIP-MCNC: NORMAL MG/DL (ref 0–2)
WBC # BLD AUTO: 6.2 10E9/L (ref 4–11)
WET PREP SPEC: NORMAL

## 2019-07-05 PROCEDURE — 83690 ASSAY OF LIPASE: CPT | Performed by: EMERGENCY MEDICINE

## 2019-07-05 PROCEDURE — 87491 CHLMYD TRACH DNA AMP PROBE: CPT | Performed by: EMERGENCY MEDICINE

## 2019-07-05 PROCEDURE — 81025 URINE PREGNANCY TEST: CPT | Performed by: EMERGENCY MEDICINE

## 2019-07-05 PROCEDURE — 80053 COMPREHEN METABOLIC PANEL: CPT | Performed by: EMERGENCY MEDICINE

## 2019-07-05 PROCEDURE — 83605 ASSAY OF LACTIC ACID: CPT | Performed by: EMERGENCY MEDICINE

## 2019-07-05 PROCEDURE — 87591 N.GONORRHOEAE DNA AMP PROB: CPT | Performed by: EMERGENCY MEDICINE

## 2019-07-05 PROCEDURE — 85025 COMPLETE CBC W/AUTO DIFF WBC: CPT | Performed by: EMERGENCY MEDICINE

## 2019-07-05 PROCEDURE — 81003 URINALYSIS AUTO W/O SCOPE: CPT | Performed by: EMERGENCY MEDICINE

## 2019-07-05 PROCEDURE — 99285 EMERGENCY DEPT VISIT HI MDM: CPT | Mod: Z6 | Performed by: EMERGENCY MEDICINE

## 2019-07-05 PROCEDURE — 87210 SMEAR WET MOUNT SALINE/INK: CPT | Performed by: EMERGENCY MEDICINE

## 2019-07-05 PROCEDURE — 99285 EMERGENCY DEPT VISIT HI MDM: CPT | Mod: 25 | Performed by: EMERGENCY MEDICINE

## 2019-07-05 PROCEDURE — 93976 VASCULAR STUDY: CPT | Mod: XS

## 2019-07-05 ASSESSMENT — ENCOUNTER SYMPTOMS
SHORTNESS OF BREATH: 1
ABDOMINAL PAIN: 1
FREQUENCY: 1
DIARRHEA: 0
DIZZINESS: 1
NECK PAIN: 1
LIGHT-HEADEDNESS: 1
BACK PAIN: 1
NAUSEA: 1
CONSTIPATION: 0
DYSURIA: 0

## 2019-07-05 ASSESSMENT — MIFFLIN-ST. JEOR: SCORE: 1650.75

## 2019-07-05 NOTE — ED TRIAGE NOTES
Patient presents with multiple complains. Abdominal pain, increased menstrual bleeding, nausea, fatigue, pelvic pain, painful intercourse, chills. Patient called nurse triage line and they instructed her to come the ED for evaluation.

## 2019-07-05 NOTE — ED AVS SNAPSHOT
[facility], [place], Emergency Department  [address] [place] [id number]  Phone:  [phone]                                    Cristine Caballero   MRN: [id number]    Department:  [facility], Emergency Department   Date of Visit:  7/5/2019           After Visit Summary Signature Page    I have received my discharge instructions, and my questions have been answered. I have discussed any challenges I see with this plan with the nurse or doctor.    ..........................................................................................................................................  Patient/Patient Representative Signature      ..........................................................................................................................................  Patient Representative Print Name and Relationship to Patient    ..................................................               ................................................  Date                                   Time    ..........................................................................................................................................  Reviewed by Signature/Title    ...................................................              ..............................................  Date                                               Time          22EPIC Rev 08/18

## 2019-07-06 NOTE — DISCHARGE INSTRUCTIONS
Thank you for coming to the [facility] Emergency Department.   OK to resume your usual activities.   Over the counter stool softeners, like magnesium citrate, miralax or fiber supplement can be used as needed for constipation.     Please follow up with your primary care provider or OB/gyn if you continue to notice urinary frequency or pain with intercourse.

## 2019-07-06 NOTE — ED PROVIDER NOTES
History     Chief Complaint   Patient presents with     Abdominal Pain     HPI  Cristine Caballero is a 29 year old female with a history of IBS, small intestinal bacterial overgrowth, exploratory laparoscopy (2011), anxiety, depression, and OCD who presents for evaluation of lower left quadrant abdominal and pelvic pain. Patient reports she's been nauseous and has had urinary frequency for the last week and a half. In addition, she states had recently attempted intercourse with her regular partner, though had to immediately stop due to severe pelvic pain. Patient reports this pain has been getting worse for the past week, and yesterday, she had an episode of vaginal spotting. She states spotting has been unusual for her ever since her IUD was placed November of last year. Finally, today, patient had just gotten off a flight from [place], [place]. Upon standing from her seat, she developed severe pain in her lower left abdomen. She states her breathing then became labored, and she became clammy, dizzy, and lightheaded as well. Patient called the triage nurse, who recommended she come in here for evaluation.    Currently, patient continues to have pain in her lower left abdomen and feels her back and neck are sore as well. She denies vaginal discharge, dysuria, or abnormal bowel movements. Denies history of ovarian cysts. Patient reports her last regular menses were sometime in October, just before her IUD was placed. She states she does not use any other contraceptives with her partner.    Past Medical History:   Diagnosis Date     Anxiety disorder      Depressive disorder      Environmental allergies      Gastroesophageal reflux disease      Hx of eating disorder      Hypertension     elevated since propanolol discontinued     IBS (irritable bowel syndrome)      OCD (obsessive compulsive disorder)     stable with CBT      Small intestinal bacterial overgrowth 2018    pt reported.      Suicide attempt by multiple drug  overdose (H) 4.21.2016     Vitamin D deficiency        Past Surgical History:   Procedure Laterality Date     ABDOMEN SURGERY  01/2012    Exploratory Laparoscopy     biopsy  8-    vulvar     BIOPSY      GI, vulva- OK     COLONOSCOPY  2011     COLONOSCOPY N/A 5/15/2019    Procedure: Colonoscopy, With Polypectomy And Biopsy;  Surgeon: Perico Meek MD;  [facility]     COLONOSCOPY WITH CO2 INSUFFLATION N/A 5/15/2019    Procedure: COLONOSCOPY, WITH CO2 INSUFFLATION;  Surgeon: Perico Meek MD;  [facility]     COMBINED ESOPHAGOSCOPY, GASTROSCOPY, DUODENOSCOPY (EGD) WITH CO2 INSUFFLATION N/A 5/15/2019    Procedure: ESOPHAGOGASTRODUODENOSCOPY, WITH CO2 INSUFFLATION;  Surgeon: Perico Meek MD;  [facility]     DENTAL SURGERY  2011     ENT SURGERY  2014    intubation & NG tube     ESOPHAGOSCOPY, GASTROSCOPY, DUODENOSCOPY (EGD), COMBINED  10/11/2011    Procedure:COMBINED ESOPHAGOSCOPY, GASTROSCOPY, DUODENOSCOPY (EGD), BIOPSY SINGLE OR MULTIPLE; Surgeon:TALA MONTEMAYOR; [facility]     ESOPHAGOSCOPY, GASTROSCOPY, DUODENOSCOPY (EGD), COMBINED N/A 5/15/2019    Procedure: Esophagogastroduodenoscopy, With Biopsy;  Surgeon: Perico Meek MD;  [facility]     HC BREATH HYDROGEN TEST  10/4/2011    Procedure:HYDROGEN BREATH TEST; Surgeon:RADHA EPSTEIN; [facility]     LAPAROSCOPY       ORTHOPEDIC SURGERY      left wrist- screw in left scaphiod     TONSILLECTOMY       WRIST SURGERY  2004    screw placed L wrist after fracture       Family History   Problem Relation Age of Onset     Depression Mother      Allergies Mother      Asthma Mother      Thyroid Disease Mother      Eye Disorder Mother      Blood Disease Mother          essential thrombocytosis     Cancer Mother         AML, recent BMT     Anxiety Disorder Mother      Mental Illness Mother         Hoarding- OCD     Depression Sister      Anxiety Disorder Sister      Mental Illness Sister         OCD_ HOarding     Genitourinary Problems  Sister      Asthma Sister      Eye Disorder Sister      Allergies Sister      Allergies Sister      Mental Illness Sister         ADHD     Depression Maternal Grandmother      Heart Disease Maternal Grandmother      Thyroid Disease Maternal Grandmother      Depression Other         aunt     Anxiety Disorder Other      Genitourinary Problems Other         aunt     Suicide Other      Heart Disease Maternal Grandfather      C.A.D. Maternal Grandfather      C.A.D. Paternal Grandfather      Cancer Paternal Grandfather      Eye Disorder Father      Mental Illness Father         OCPD?     Allergies Other         uncles       Social History     Tobacco Use     Smoking status: Never Smoker     Smokeless tobacco: Never Used   Substance Use Topics     Alcohol use: Yes     Alcohol/week: 1.5 oz     Comment: 1-2 EtOH drinks 2 days per week       No current facility-administered medications for this encounter.      Current Outpatient Medications   Medication     acetaminophen (ACETAMINOPHEN EXTRA STRENGTH) 500 MG tablet     calcium carbonate 750 MG CHEW     Cholecalciferol (VITAMIN D) 2000 UNITS tablet     hydrOXYzine (ATARAX) 25 MG tablet     hydrOXYzine (VISTARIL) 25 MG capsule     levonorgestrel (KYLEENA) 19.5 MG IUD     Omega-3 Fatty Acids (OMEGA-3 FISH OIL PO)     omeprazole (PRILOSEC) 40 MG DR capsule     omeprazole 20 MG tablet     omeprazole 20 MG tablet     polyethylene glycol (MIRALAX/GLYCOLAX) packet     propranolol (INDERAL LA) 80 MG 24 hr capsule     propranolol (INDERAL) 40 MG tablet     simethicone (MYLICON) 125 MG chewable tablet     Venlafaxine HCl (EFFEXOR PO)        Allergies   Allergen Reactions     Betadex Nausea and Vomiting     Codeine Other (See Comments)     Other reaction(s): Tremors     Corn Dextrin      Penicillin G      Sulfa Drugs      Fentanyl Rash     Shauna oral rash after fentanyl during egd in 2011     I have reviewed the Medications, Allergies, Past Medical and Surgical History, and Social History  "in the Epic system.    Review of Systems   Respiratory: Positive for shortness of breath.    Gastrointestinal: Positive for abdominal pain (lower left quadrant) and nausea. Negative for constipation and diarrhea.   Genitourinary: Positive for frequency, pelvic pain and vaginal bleeding. Negative for dysuria and vaginal discharge.   Musculoskeletal: Positive for back pain and neck pain.   Neurological: Positive for dizziness and light-headedness.   All other systems reviewed and are negative.      Physical Exam   BP: 131/90  Pulse: 82  Heart Rate: 98  Temp: 98.6  F (37  C)  Resp: 16  Height: 165.1 cm (5' 5\")  Weight: 93 kg (205 lb)  SpO2: 100 %      Physical Exam   Gen:A&Ox3, no acute distress  HEENT:PERRL, no facial tenderness or wounds, head atraumatic, oropharynx clear, mucous membranes moist, TMs clear bilaterally  Neck:no bony tenderness or step offs, no JVD, trachea midline  Back: no CVA tenderness, no midline bony tenderness  CV:RRR without murmurs  PULM:Clear to auscultation bilaterally  Abd:soft, mild LLQ tenderness  Pelvic: Perineum free of wounds or lesions.  Normal vaginal mucosa.  Cervix normal.  Normal appearance and quantity of vaginal mucus. No CMT, mild left adnexal tenderness without fullness. IUD strings visible  UE:No traumatic injuries, skin normal  LE:no traumatic injuries, skin normal, no LE edema  Neuro:CN II-XII intact, strength 5/5 throughout, gait stalbe  Skin: no rashes or ecchymoses        ED Course        Procedures       8:45 PM  The patient was seen and examined by Dr. Varela in Room 12.     Critical Care time:  none    Labs Ordered and Resulted from Time of ED Arrival Up to the Time of Departure from the ED   URINE MACROSCOPIC WITH REFLEX TO MICRO - Abnormal; Notable for the following components:       Result Value    Specific Gravity Urine 1.002 (*)     All other components within normal limits   HCG QUALITATIVE URINE   CBC WITH PLATELETS DIFFERENTIAL   COMPREHENSIVE METABOLIC PANEL "   LIPASE   LACTIC ACID WHOLE BLOOD   PERIPHERAL IV CATHETER   PREP FOR PROCEDURE   WET PREP   CHLAMYDIA TRACHOMATIS PCR   NEISSERIA GONORRHOEAE PCR       Assessments & Plan (with Medical Decision Making)   29-year-old female with a history of IBS who presents with left lower quadrant abdominal discomfort.  Associated with nausea, uterine spotting, and urinary frequency.  Differential diagnosis included ureterolithiasis, dysfunction uterine bleeding, ovarian cyst or torsion, malpositioned IUD, UTI, constipation and functional abdominal pain.  CBC and complete metabolic panel unremarkable.  Lipase 111.  Pregnancy test negative.  Urinalysis negative for UTI or hematuria to suggest acute ureterolithiasis.  Pelvic exam without signs of cervicitis and IUD strings visible.  Wet prep negative for trichomonas, yeast infection or bacterial vaginosis.  Gonorrhea and Chlamydia testing process though low suspicion for acute infection.  Will await test results rather than treat empirically.  Pelvic ultrasound performed and shows that her IUD is appropriately positioned.  No uterine abnormality's.  No adnexal cysts.  Normal ovarian blood flow suggesting against torsion.  No free intra-abdominal fluid.  Patient's pain is manageable.  Like most likely functional abdominal discomfort and possible constipation.  Try over-the-counter stool softener and follow-up with primary care doctor or OB/GYN if she continues to notice any dysuria or pain with intercourse.  Discharge.    I have reviewed the nursing notes.    I have reviewed the findings, diagnosis, plan and need for follow up with the patient.       Medication List      There are no discharge medications for this visit.         Final diagnoses:   Pelvic pain in female   IFelix, am serving as a trained medical scribe to document services personally performed by Cyndy Varela MD, based on the provider's statements to me.   ICyndy MD, was physically present and have  reviewed and verified the accuracy of this note documented by Felix Laura.    7/5/2019   [facility], [place], EMERGENCY DEPARTMENT    MD TARYN Boyle Katrina Anne, MD  07/06/19 0159

## 2019-07-18 ENCOUNTER — OFFICE VISIT (OUTPATIENT)
Dept: FAMILY MEDICINE | Facility: CLINIC | Age: 29
End: 2019-07-18
Attending: FAMILY MEDICINE
Payer: COMMERCIAL

## 2019-07-18 VITALS
WEIGHT: 209 LBS | SYSTOLIC BLOOD PRESSURE: 120 MMHG | HEIGHT: 65 IN | HEART RATE: 80 BPM | BODY MASS INDEX: 34.82 KG/M2 | DIASTOLIC BLOOD PRESSURE: 81 MMHG

## 2019-07-18 DIAGNOSIS — K62.5 RECTAL BLEEDING: Primary | ICD-10-CM

## 2019-07-18 PROCEDURE — G0463 HOSPITAL OUTPT CLINIC VISIT: HCPCS | Mod: ZF

## 2019-07-18 ASSESSMENT — ANXIETY QUESTIONNAIRES
2. NOT BEING ABLE TO STOP OR CONTROL WORRYING: NOT AT ALL
7. FEELING AFRAID AS IF SOMETHING AWFUL MIGHT HAPPEN: NOT AT ALL
1. FEELING NERVOUS, ANXIOUS, OR ON EDGE: NOT AT ALL
3. WORRYING TOO MUCH ABOUT DIFFERENT THINGS: NOT AT ALL
GAD7 TOTAL SCORE: 0
6. BECOMING EASILY ANNOYED OR IRRITABLE: NOT AT ALL
5. BEING SO RESTLESS THAT IT IS HARD TO SIT STILL: NOT AT ALL

## 2019-07-18 ASSESSMENT — PATIENT HEALTH QUESTIONNAIRE - PHQ9
5. POOR APPETITE OR OVEREATING: NOT AT ALL
SUM OF ALL RESPONSES TO PHQ QUESTIONS 1-9: 2

## 2019-07-18 ASSESSMENT — PAIN SCALES - GENERAL: PAINLEVEL: NO PAIN (0)

## 2019-07-18 ASSESSMENT — MIFFLIN-ST. JEOR: SCORE: 1668.9

## 2019-07-18 NOTE — PROGRESS NOTES
Cristine is a 28 year old female who presents to discuss a need for a letter and to discuss transferring care to [facility] ([facility]    - Needing forms filled out for the airlines to reimburse her trip on 10/2018 as she was unable to fly because of illness.  Had rectal bleeding and was having a hard time getting scheduled with GI and the procedures needed to evaluate etiology.  Did not feel safe flying.      HX of IBS/SIBO: in the ED 7.5.2019 for abd/pelic pain - thought to be functional in nature.       -   Has taken rifaximin in the past which has been helpful.  Past Medical History:   Diagnosis Date     Anxiety disorder      Depressive disorder      Environmental allergies      Gastroesophageal reflux disease      Hx of eating disorder      Hypertension     elevated since propanolol discontinued     IBS (irritable bowel syndrome)      OCD (obsessive compulsive disorder)     stable with CBT      Small intestinal bacterial overgrowth 2018    pt reported.      Suicide attempt by multiple drug overdose (H) 4.21.2016     Vitamin D deficiency    Life Style Modifiers:   Tobacco:  reports that she has never smoked. She has never used smokeless tobacco.   Alcohol:  reports that she drinks about 1.5 oz of alcohol per week.   Drug use:  reports that she does not use drugs.  PAST SURGICAL HISTORY:  Past Surgical History:   Procedure Laterality Date     ABDOMEN SURGERY  01/2012    Exploratory Laparoscopy     biopsy  8-    vulvar     BIOPSY      GI, vulva- OK     COLONOSCOPY  2011     COLONOSCOPY N/A 5/15/2019    Procedure: Colonoscopy, With Polypectomy And Biopsy;  Surgeon: Perico Meek MD;  [facility]     COLONOSCOPY WITH CO2 INSUFFLATION N/A 5/15/2019    Procedure: COLONOSCOPY, WITH CO2 INSUFFLATION;  Surgeon: Perico Meek MD;  [facility]     COMBINED ESOPHAGOSCOPY, GASTROSCOPY, DUODENOSCOPY (EGD) WITH CO2 INSUFFLATION N/A 5/15/2019    Procedure: ESOPHAGOGASTRODUODENOSCOPY, WITH CO2  INSUFFLATION;  Surgeon: Perico Meek MD;  [facility]     DENTAL SURGERY  2011     ENT SURGERY  2014    intubation & NG tube     ESOPHAGOSCOPY, GASTROSCOPY, DUODENOSCOPY (EGD), COMBINED  10/11/2011    Procedure:COMBINED ESOPHAGOSCOPY, GASTROSCOPY, DUODENOSCOPY (EGD), BIOPSY SINGLE OR MULTIPLE; Surgeon:TALA MONTEMAYOR; [facility]     ESOPHAGOSCOPY, GASTROSCOPY, DUODENOSCOPY (EGD), COMBINED N/A 5/15/2019    Procedure: Esophagogastroduodenoscopy, With Biopsy;  Surgeon: Perico Meek MD;  [facility]: [facility]     HC BREATH HYDROGEN TEST  10/4/2011    Procedure:HYDROGEN BREATH TEST; Surgeon:RADHA EPSTEIN; [facility]     LAPAROSCOPY       ORTHOPEDIC SURGERY      left wrist- screw in left scaphiod     TONSILLECTOMY       WRIST SURGERY  2004    screw placed L wrist after fracture   FAMILY HISTORY:  Family History   Problem Relation Age of Onset     Depression Mother      Allergies Mother      Asthma Mother      Thyroid Disease Mother      Eye Disorder Mother      Blood Disease Mother          essential thrombocytosis     Cancer Mother         AML, recent BMT     Anxiety Disorder Mother      Mental Illness Mother         Hoarding- OCD     Depression Sister      Anxiety Disorder Sister      Mental Illness Sister         OCD_ HOarding     Genitourinary Problems Sister      Asthma Sister      Eye Disorder Sister      Allergies Sister      Allergies Sister      Mental Illness Sister         ADHD     Depression Maternal Grandmother      Heart Disease Maternal Grandmother      Thyroid Disease Maternal Grandmother      Depression Other         aunt     Anxiety Disorder Other      Genitourinary Problems Other         aunt     Suicide Other      Heart Disease Maternal Grandfather      C.A.D. Maternal Grandfather      C.A.D. Paternal Grandfather      Cancer Paternal Grandfather      Eye Disorder Father      Mental Illness Father         OCPD?     Allergies Other         uncles   SOCIAL HISTORY:  Unemployed  Social  "History     Marital status: Single     Spouse name: N/A     Number of children: N/A     Years of education: N/A   MEDICATIONS:  Current Outpatient Medications   Medication Sig Dispense Refill     acetaminophen (ACETAMINOPHEN EXTRA STRENGTH) 500 MG tablet Take 500-1,000 mg by mouth every 4 hours as needed for mild pain       calcium carbonate 750 MG CHEW Take 4 chew tab by mouth daily        Cholecalciferol (VITAMIN D) 2000 UNITS tablet Take 1 tablet by mouth daily Take one tablet daily. (Patient taking differently: Take 4,000 Units by mouth daily ) 90 tablet 3     hydrOXYzine (ATARAX) 25 MG tablet Take 25 mg by mouth daily as needed for itching       hydrOXYzine (VISTARIL) 25 MG capsule Take 75 mg by mouth nightly as needed        levonorgestrel (KYLEENA) 19.5 MG IUD 1 each (19.5 mg) by Intrauterine route once for 1 dose (Patient taking differently: 1 each by Intrauterine route once ) 1 each 0     Omega-3 Fatty Acids (OMEGA-3 FISH OIL PO) Take 2 g by mouth 2 times daily        omeprazole (PRILOSEC) 40 MG DR capsule Take 1 capsule (40 mg) by mouth every morning On an empty stomach (Patient not taking: Reported on 5/10/2019) 90 capsule 0     omeprazole 20 MG tablet Take 1 tablet (20 mg) by mouth daily 30 tablet 1     omeprazole 20 MG tablet Take 20 mg by mouth daily       polyethylene glycol (MIRALAX/GLYCOLAX) packet Take 1 packet by mouth daily       propranolol (INDERAL LA) 80 MG 24 hr capsule Take 80 mg by mouth At Bedtime        propranolol (INDERAL) 40 MG tablet Take 40 mg by mouth 3 times daily       simethicone (MYLICON) 125 MG chewable tablet Take by mouth 2 times daily as needed for intestinal gas 3 tabs       Venlafaxine HCl (EFFEXOR PO) Take 300 mg by mouth daily Take 2- 150mg  tablets     ALLERGIES:  Betadex; Codeine; Corn dextrin; Penicillin g; Sulfa drugs; and Fentanyl  VITALS:  /81   Pulse 80   Ht 1.651 m (5' 5\")   Wt 94.8 kg (209 lb)   BMI 34.78 kg/m    PHYSICAL EXAM:  Constitutional: " Overweight woman in NAD  Emotionally distress with lack of plan and diagnosis for her illness.   Total of 20 minutes was spent in direct contact with the patient and >50% of the time in patient education and coordination of care.  Diagnoses and associated orders for this visit:  Rectal bleeding resolved with a negative GI work-up        - papers filled out for insurance reimbursement for flight  Other orders  -     Nutritional Supplements (NUTRITIONAL SUPPLEMENT PO); CAlm-magnesium carbonate, glycinate        -     Is transferring care to [facility] ([facility]

## 2019-07-18 NOTE — LETTER
7/18/2019       RE: Cristine Caballero  [address]  [facility] [address]     Dear Colleague,    Thank you for referring your patient, Cristine Caballero, to the [facility] at [facility]. Please see a copy of my visit note below.    Cristine is a 28 year old female who presents to discuss a need for a letter and to discuss transferring care to [facility] ([facility]].    - Needing forms filled out for the airlines to reimburse her trip on 10/2018 as she was unable to fly because of illness.  Had rectal bleeding and was having a hard time getting scheduled with GI and the procedures needed to evaluate etiology.  Did not feel safe flying.      HX of IBS/SIBO: in the ED 7.5.2019 for abd/pelic pain - thought to be functional in nature.       -   Has taken rifaximin in the past which has been helpful.  Past Medical History:   Diagnosis Date     Anxiety disorder      Depressive disorder      Environmental allergies      Gastroesophageal reflux disease      Hx of eating disorder      Hypertension     elevated since propanolol discontinued     IBS (irritable bowel syndrome)      OCD (obsessive compulsive disorder)     stable with CBT      Small intestinal bacterial overgrowth 2018    pt reported.      Suicide attempt by multiple drug overdose (H) 4.21.2016     Vitamin D deficiency    Life Style Modifiers:   Tobacco:  reports that she has never smoked. She has never used smokeless tobacco.   Alcohol:  reports that she drinks about 1.5 oz of alcohol per week.   Drug use:  reports that she does not use drugs.  PAST SURGICAL HISTORY:  Past Surgical History:   Procedure Laterality Date     ABDOMEN SURGERY  01/2012    Exploratory Laparoscopy     biopsy  8-    vulvar     BIOPSY      GI, vulva- OK     COLONOSCOPY  2011     COLONOSCOPY N/A 5/15/2019    Procedure: Colonoscopy, With Polypectomy And Biopsy;  Surgeon: Perico Meek MD;  Location:  OR      COLONOSCOPY WITH CO2 INSUFFLATION N/A 5/15/2019    Procedure: COLONOSCOPY, WITH CO2 INSUFFLATION;  Surgeon: Perico Meek MD;  [facility]     COMBINED ESOPHAGOSCOPY, GASTROSCOPY, DUODENOSCOPY (EGD) WITH CO2 INSUFFLATION N/A 5/15/2019    Procedure: ESOPHAGOGASTRODUODENOSCOPY, WITH CO2 INSUFFLATION;  Surgeon: Perico Meek MD;  [facility]     DENTAL SURGERY  2011     ENT SURGERY  2014    intubation & NG tube     ESOPHAGOSCOPY, GASTROSCOPY, DUODENOSCOPY (EGD), COMBINED  10/11/2011    Procedure:COMBINED ESOPHAGOSCOPY, GASTROSCOPY, DUODENOSCOPY (EGD), BIOPSY SINGLE OR MULTIPLE; Surgeon:TALA MONTEMAYOR; [facility]     ESOPHAGOSCOPY, GASTROSCOPY, DUODENOSCOPY (EGD), COMBINED N/A 5/15/2019    Procedure: Esophagogastroduodenoscopy, With Biopsy;  Surgeon: Perico Meek MD;  [facility]     HC BREATH HYDROGEN TEST  10/4/2011    Procedure:HYDROGEN BREATH TEST; Surgeon:RADHA EPSTEIN; [facility]     LAPAROSCOPY       ORTHOPEDIC SURGERY      left wrist- screw in left scaphiod     TONSILLECTOMY       WRIST SURGERY  2004    screw placed L wrist after fracture   FAMILY HISTORY:  Family History   Problem Relation Age of Onset     Depression Mother      Allergies Mother      Asthma Mother      Thyroid Disease Mother      Eye Disorder Mother      Blood Disease Mother          essential thrombocytosis     Cancer Mother         AML, recent BMT     Anxiety Disorder Mother      Mental Illness Mother         Hoarding- OCD     Depression Sister      Anxiety Disorder Sister      Mental Illness Sister         OCD_ HOarding     Genitourinary Problems Sister      Asthma Sister      Eye Disorder Sister      Allergies Sister      Allergies Sister      Mental Illness Sister         ADHD     Depression Maternal Grandmother      Heart Disease Maternal Grandmother      Thyroid Disease Maternal Grandmother      Depression Other         aunt     Anxiety Disorder Other      Genitourinary Problems Other         aunt      Suicide Other      Heart Disease Maternal Grandfather      C.A.D. Maternal Grandfather      C.ABERTO. Paternal Grandfather      Cancer Paternal Grandfather      Eye Disorder Father      Mental Illness Father         OCPD?     Allergies Other         uncles   SOCIAL HISTORY:  Unemployed  Social History     Marital status: Single     Spouse name: N/A     Number of children: N/A     Years of education: N/A   MEDICATIONS:  Current Outpatient Medications   Medication Sig Dispense Refill     acetaminophen (ACETAMINOPHEN EXTRA STRENGTH) 500 MG tablet Take 500-1,000 mg by mouth every 4 hours as needed for mild pain       calcium carbonate 750 MG CHEW Take 4 chew tab by mouth daily        Cholecalciferol (VITAMIN D) 2000 UNITS tablet Take 1 tablet by mouth daily Take one tablet daily. (Patient taking differently: Take 4,000 Units by mouth daily ) 90 tablet 3     hydrOXYzine (ATARAX) 25 MG tablet Take 25 mg by mouth daily as needed for itching       hydrOXYzine (VISTARIL) 25 MG capsule Take 75 mg by mouth nightly as needed        levonorgestrel (KYLEENA) 19.5 MG IUD 1 each (19.5 mg) by Intrauterine route once for 1 dose (Patient taking differently: 1 each by Intrauterine route once ) 1 each 0     Omega-3 Fatty Acids (OMEGA-3 FISH OIL PO) Take 2 g by mouth 2 times daily        omeprazole (PRILOSEC) 40 MG DR capsule Take 1 capsule (40 mg) by mouth every morning On an empty stomach (Patient not taking: Reported on 5/10/2019) 90 capsule 0     omeprazole 20 MG tablet Take 1 tablet (20 mg) by mouth daily 30 tablet 1     omeprazole 20 MG tablet Take 20 mg by mouth daily       polyethylene glycol (MIRALAX/GLYCOLAX) packet Take 1 packet by mouth daily       propranolol (INDERAL LA) 80 MG 24 hr capsule Take 80 mg by mouth At Bedtime        propranolol (INDERAL) 40 MG tablet Take 40 mg by mouth 3 times daily       simethicone (MYLICON) 125 MG chewable tablet Take by mouth 2 times daily as needed for intestinal gas 3 tabs       Venlafaxine  "HCl (EFFEXOR PO) Take 300 mg by mouth daily Take 2- 150mg  tablets     ALLERGIES:  Betadex; Codeine; Corn dextrin; Penicillin g; Sulfa drugs; and Fentanyl  VITALS:  /81   Pulse 80   Ht 1.651 m (5' 5\")   Wt 94.8 kg (209 lb)   BMI 34.78 kg/m     PHYSICAL EXAM:  Constitutional: Overweight woman in NAD  Emotionally distress with lack of plan and diagnosis for her illness.   Total of 20 minutes was spent in direct contact with the patient and >50% of the time in patient education and coordination of care.  Diagnoses and associated orders for this visit:  Rectal bleeding resolved with a negative GI work-up        - papers filled out for insurance reimbursement for flight  Other orders  -     Nutritional Supplements (NUTRITIONAL SUPPLEMENT PO); CAlm-magnesium carbonate, glycinate        -     Is transferring care to [facility] ([facility]    Again, thank you for allowing me to participate in the care of your patient.      Sincerely,    Natalie Desai MD      "

## 2019-07-19 ASSESSMENT — ANXIETY QUESTIONNAIRES: GAD7 TOTAL SCORE: 0

## 2019-07-26 ENCOUNTER — THERAPY VISIT (OUTPATIENT)
Dept: PHYSICAL THERAPY | Facility: CLINIC | Age: 29
End: 2019-07-26
Payer: COMMERCIAL

## 2019-07-26 DIAGNOSIS — G89.29 CHRONIC BILATERAL LOW BACK PAIN WITHOUT SCIATICA: ICD-10-CM

## 2019-07-26 DIAGNOSIS — M25.551 BILATERAL HIP PAIN: ICD-10-CM

## 2019-07-26 DIAGNOSIS — M25.552 BILATERAL HIP PAIN: ICD-10-CM

## 2019-07-26 DIAGNOSIS — M54.50 CHRONIC BILATERAL LOW BACK PAIN WITHOUT SCIATICA: ICD-10-CM

## 2019-07-26 PROCEDURE — 97162 PT EVAL MOD COMPLEX 30 MIN: CPT | Mod: GP | Performed by: PHYSICAL THERAPIST

## 2019-07-26 PROCEDURE — 97110 THERAPEUTIC EXERCISES: CPT | Mod: GP | Performed by: PHYSICAL THERAPIST

## 2019-07-26 NOTE — PROGRESS NOTES
[facility] Initial Evaluation    Subjective:  Cristine Caballero is a 29 year old female with a lumbar condition. Symptoms commenced as a result of: chronic low back and hip pain for 10+ years; most recent onset of pain symptoms resulting from severe flu coughing causing hip flexor strain per patient. Condition occurred in the following environment: NA. Onset of symptoms: June 2019. Location of symptoms: low back, bilateral lateral and anterior hips. Pain level on number scale: 6/10. Quality of pain: sharp, stabbing. Associated symptoms: weakness, stiffness. Pain frequency (constant/intermittent): intermittent. Pain worse (day/night/same all the time/AM/PM): night. Symptoms are exacerbated by: washing, dressing, lifting, walking, sitting, standing, sleeping . Symptoms are relieved by: yoga. Progression of symptoms since onset (same/better/worse): better. Special tests (x-ray, MRI, CT scan, EMG, bone scan): US. Previous treatment: None. Improvement with previous treatment: NA. General health as reported by patient is good. Pertinent medical history includes:  See Epic. Medical allergies:  see Epic. Other pertinent surgeries:  see Epic. Current medications: See Epic. Occupation: nursing assistant.  Patient is (working in normal job without restrictions/working in normal job with restrictions/working in an alternate job/not working due to present treatment problem): working in internship and volunteering. Primary job tasks: computer work, lifting, carrying, operating a machine, assembly, prolonged sitting and standing, pushing, pulling, repetitive tasks. Barriers at home/work:  None reported by patient. Red flags:  None reported by patient.    Objective  Posture    Sitting (good/fair/poor): poor  Standing (good/fair/poor):  fair  Lordosis (red/acc/normal):  normal  Lateral shift (right/left/nil):  nil  Relevant lateral shift (yes/no):  no  Correction of posture (better/worse/no effect):  better    Neurological    Myotomes L R   L1-2 (hip flexion) 5-/5 5/5   L3 (knee extension) 5/5 5/5   L4 (ankle DF) 5/5 5/5   L5 (g. toe ext) 5/5 5/5   S1 (ankle PF or knee flex) 5/5 5/5     Sensory Deficit, Reflexes: WNL    Lumbar Movement Loss Mandeep Mod Min Nil Pain   Flexion    x +   Extension    x +   Side Gliding L    x +   Side Gliding R    x +     Other Tests: bilateral hip AROM WNL; pain in bilateral hips with end range IR and FL    Hip Strength (* = pain) Right Left   FL 5/5 5-/5   EXT NT/5 NT/5   ABD 4-/5 4-/5   ADD NT/5 NT/5   ER NT/5 NT/5   IR NT/5 NT/5     Special Tests Right Left   FADIR + +   BEULAH - -   Trendelenburg's Sign + +   Arthur Test - -   Latrice's Test       Palpation tenderness:  Bilateral PSIS, bilateral hip flexors      Assessment/Plan:    Patient is a 29 year old female with lumbar complaints.    Patient has the following significant findings with corresponding treatment plan.                Diagnosis 1:  Lbp, bilateral hip pain  Pain -  manual therapy, self management, education, directional preference exercise and home program  Decreased ROM/flexibility - manual therapy and therapeutic exercise  Decreased strength - therapeutic exercise and therapeutic activities  Impaired balance - neuro re-education and therapeutic activities  Decreased proprioception - neuro re-education and therapeutic activities  Impaired gait - gait training  Impaired muscle performance - neuro re-education  Decreased function - therapeutic activities  Impaired posture - neuro re-education    Therapy Evaluation Codes:   1) History comprised of:   Personal factors that impact the plan of care:      None.    Comorbidity factors that impact the plan of care are:      Depression, High blood pressure, Mental illness, Numbness/tingling, Overweight, Sleep disorder/apnea and Weakness.     Medications impacting care: None.  2) Examination of Body Systems comprised of:   Body structures and functions that impact the plan of  care:      Hip, Lumbar spine and Pelvis.   Activity limitations that impact the plan of care are:      Bathing, Bending, Cooking, Driving, Dressing, Lifting, Sitting, Standing, Walking, Sleeping and Laying down.  3) Clinical presentation characteristics are:   Stable/Uncomplicated.  4) Decision-Making    Moderate complexity using standardized patient assessment instrument and/or measureable assessment of functional outcome.  Cumulative Therapy Evaluation is: Moderate complexity.    Previous and current functional limitations:  (See Goal Flow Sheet for this information)    Short term and Long term goals: (See Goal Flow Sheet for this information)     Communication ability:  Patient appears to be able to clearly communicate and understand verbal and written communication and follow directions correctly.  Treatment Explanation - The following has been discussed with the patient:   RX ordered/plan of care  Anticipated outcomes  Possible risks and side effects  This patient would benefit from PT intervention to resume normal activities.   Rehab potential is good.    Frequency:  1 X week, once daily  Duration:  for 4 weeks tapering to 2 X a month over 1 month  Discharge Plan:  Achieve all LTG.  Independent in home treatment program.  Reach maximal therapeutic benefit.    Please refer to the daily flowsheet for treatment today, total treatment time and time spent performing 1:1 timed codes.

## 2019-08-08 ENCOUNTER — TELEPHONE (OUTPATIENT)
Dept: GASTROENTEROLOGY | Facility: CLINIC | Age: 29
End: 2019-08-08

## 2019-08-09 NOTE — TELEPHONE ENCOUNTER
FUTURE VISIT INFORMATION      FUTURE VISIT INFORMATION:    Date: 8/13/2019     Time: 3:00pm    Location: [facility]  REFERRAL INFORMATION:    Referring provider:  Dr. Natalie Desai    Referring providers clinic:  [facility]    Reason for visit/diagnosis:  New allergy - Hair loss, axillary hidradenitis supparativa, dermatitis    RECORDS REQUESTED FROM:       Clinic name Comments Records Status Imaging Status   [facility] 4/25/2019 - Office Visit - Dr. Natalie Desai Epic

## 2019-08-12 ENCOUNTER — OFFICE VISIT (OUTPATIENT)
Dept: GASTROENTEROLOGY | Facility: CLINIC | Age: 29
End: 2019-08-12
Attending: FAMILY MEDICINE
Payer: COMMERCIAL

## 2019-08-12 ENCOUNTER — PRE VISIT (OUTPATIENT)
Dept: GASTROENTEROLOGY | Facility: CLINIC | Age: 29
End: 2019-08-12

## 2019-08-12 VITALS
RESPIRATION RATE: 16 BRPM | HEART RATE: 108 BPM | DIASTOLIC BLOOD PRESSURE: 97 MMHG | SYSTOLIC BLOOD PRESSURE: 134 MMHG | BODY MASS INDEX: 33.62 KG/M2 | HEIGHT: 65 IN | OXYGEN SATURATION: 96 % | WEIGHT: 201.8 LBS | TEMPERATURE: 98.9 F

## 2019-08-12 DIAGNOSIS — M62.89 PELVIC FLOOR DYSFUNCTION: ICD-10-CM

## 2019-08-12 DIAGNOSIS — Z87.2 HISTORY OF HIDRADENITIS SUPPURATIVA: ICD-10-CM

## 2019-08-12 DIAGNOSIS — K58.1 IRRITABLE BOWEL SYNDROME WITH CONSTIPATION: Primary | Chronic | ICD-10-CM

## 2019-08-12 SDOH — HEALTH STABILITY: MENTAL HEALTH: HOW OFTEN DO YOU HAVE 6 OR MORE DRINKS ON ONE OCCASION?: MONTHLY

## 2019-08-12 SDOH — HEALTH STABILITY: MENTAL HEALTH: HOW OFTEN DO YOU HAVE A DRINK CONTAINING ALCOHOL?: MONTHLY OR LESS

## 2019-08-12 SDOH — HEALTH STABILITY: MENTAL HEALTH: HOW MANY STANDARD DRINKS CONTAINING ALCOHOL DO YOU HAVE ON A TYPICAL DAY?: 1 OR 2

## 2019-08-12 ASSESSMENT — ENCOUNTER SYMPTOMS
MUSCLE WEAKNESS: 1
TROUBLE SWALLOWING: 0
LOSS OF CONSCIOUSNESS: 0
SPEECH CHANGE: 0
EYE WATERING: 0
NECK PAIN: 1
DECREASED CONCENTRATION: 1
SORE THROAT: 0
POLYPHAGIA: 0
STIFFNESS: 1
NECK MASS: 0
MEMORY LOSS: 0
SEIZURES: 0
ALTERED TEMPERATURE REGULATION: 0
DEPRESSION: 0
NAUSEA: 1
WHEEZING: 0
SHORTNESS OF BREATH: 0
NERVOUS/ANXIOUS: 1
COUGH DISTURBING SLEEP: 1
SPUTUM PRODUCTION: 1
EYE PAIN: 1
SMELL DISTURBANCE: 0
DYSURIA: 0
POSTURAL DYSPNEA: 1
RECTAL PAIN: 1
PARALYSIS: 0
POOR WOUND HEALING: 1
INCREASED ENERGY: 1
HEARTBURN: 0
DYSPNEA ON EXERTION: 0
BLOATING: 0
BOWEL INCONTINENCE: 0
HEMOPTYSIS: 0
WEIGHT GAIN: 0
WEIGHT LOSS: 1
POLYDIPSIA: 0
WEAKNESS: 1
JAUNDICE: 0
JOINT SWELLING: 1
EYE IRRITATION: 0
COUGH: 1
TASTE DISTURBANCE: 0
FEVER: 0
HOARSE VOICE: 0
HALLUCINATIONS: 0
DIFFICULTY URINATING: 1
DIARRHEA: 0
DOUBLE VISION: 0
FLANK PAIN: 1
SWOLLEN GLANDS: 0
NIGHT SWEATS: 0
TREMORS: 1
NAIL CHANGES: 1
MUSCLE CRAMPS: 1
ABDOMINAL PAIN: 0
INSOMNIA: 1
EYE REDNESS: 0
VOMITING: 1
DIZZINESS: 0
FATIGUE: 1
CHILLS: 0
PANIC: 0
BACK PAIN: 1
CONSTIPATION: 1
SNORES LOUDLY: 0
SKIN CHANGES: 1
HOT FLASHES: 0
SINUS CONGESTION: 1
BRUISES/BLEEDS EASILY: 0
BLOOD IN STOOL: 1
DISTURBANCES IN COORDINATION: 0
HEADACHES: 1
NUMBNESS: 0
ARTHRALGIAS: 1
SINUS PAIN: 0
DECREASED APPETITE: 0
TINGLING: 0
DECREASED LIBIDO: 0
HEMATURIA: 0
MYALGIAS: 1

## 2019-08-12 ASSESSMENT — MIFFLIN-ST. JEOR: SCORE: 1638.73

## 2019-08-12 ASSESSMENT — PAIN SCALES - GENERAL: PAINLEVEL: NO PAIN (0)

## 2019-08-12 NOTE — PROGRESS NOTES
GI CLINIC VISIT    CC/REFERRING PROVIDER: Shilpa Kahn  REASON FOR CONSULTATION: IBS    HPI: 29 year old female w/ h/o suspected Functional Abdominal Syndrome (IBS-C classification), SIBO per several prior H2-Breath Test s/p multiple abx treatments (predominantly RFX, non-durable response in setting of atypical non-diarrheal sxs), pelvic floor dysfunction (functional obstructive defecation) responsive to PT + biofeedback, bilateral axillary Hidradenitis Suppurativa, s/p diagnostic ex-lap 2011 (for endometriosis, negative for such per pt), OCD, DENAE + depression, among other medical issues - presenting for evaluation of IBS. Doing generally better overall after weaning down her polypharmacy w/ her providers (experiencing significant side effects, which have seemed to resolve some of her more chronic tremoring/GI/malaise sxs recently). Ongoing f/u w/ Dermatology to address whether reasonable to continue abx vs. trial of anti-TNF for treatment of HS (and to reduce risk for further sinus tract formation/surgery). Reports having 1-2 variable consistency stools/day (usually soft formed), +sense of incomplete evacuation at times (improved w/ observing biofeedback/PT measures for known pelvic floor dysfunction). Able to have a daily BM (usually first thing in the morning) w/ use of OTC Mg-containing OTC supplement (not requiring Miralax or other measures). Denies any tenesmus or insecurity passing flatus, no fecal incontinence or new perianal/nocturnal sxs noted. Denies any overt obstructive sxs at this time, +ongoing passage of stool/flatus. Denies any N/V/F/C/HA/NS or other const/syst/cardiopulmonary sxs, no recent BRB/melena in stool or overt urinary changes, no unintentional wt loss or appetite/satiety changes. No other bowel/bladder habit changes, no dysphagia/odynophagia. No jaundice/icterus/pruritus, no acholic stools/steatorrhea, no new lumps/bumps, no jt pain/oral ulcer/rash/eye sxs noted.    ROS: 10pt ROS  performed and otherwise negative.    PERTINENT PAST MEDICAL/SURGICAL HISTORY:  As noted above.    PERTINENT MEDICATIONS:  - oral Mg-containing OTC supplement  - Anticoagulation/Antiplatelet Agents: none.  Medications reviewed with patient today, see Medication List/Assessment for details.  No other NSAID/anticoagulation reported by patient.  No other OTC/herbal/supplements reported by patient.    SOCIAL HISTORY: Tobacco: none. Drinks 1x/month, usually 1 drink/sitting. ER Technician, currently seeking work.    FAMILY HISTORY:  Mother w/ AML, Sister w/ IBS. No colon/panc/esophageal/other GI CA, no other Smith or other HPS-related Jordy. No IBD/celiac, no other AI/liver/thyroid disease. No known FH bleeding/clotting disorders.    PHYSICAL EXAMINATION:  Vitals reviewed, AFVSS  Wt 201# today (~30# decr since 5/2019, relative  Gen: aaox3, cooperative, pleasant, not diaphoretic, nad  HEENT: ncat, neck supple, no clad/sclad, normal op w/o ulcer/exudate, anicteric, mmm  Resp/CV without acute findings, not dyspneic/tachycardic  Abd: +nabs, soft, nt, nd, no peritoneal s/s noted  Ext: no c/c/e  Skin: warm, perfused, no jaundice  Neuro: grossly intact, no asterixis noted    PERTINENT STUDIES:  Reviewed 5/15/19 EGD + COL findings and biopsy results in detail today.  Reviewed 10/11/11 EGD findings and biopsy results in detail today.    Surgical Pathology 5/15/19  A. DUODENUM, BIOPSY:   - Duodenal mucosa with no significant histologic abnormality     B. STOMACH, ANTRUM, BIOPSY:   - Features of reactive gastropathy   - No H. pylori like organisms identified on routine staining   - Negative for intestinal metaplasia or dysplasia     C. STOMACH, BODY, BIOPSY:   - Gastric mucosa with minimal inflammation   - No H. pylori like organisms identified on routine staining   - Negative for intestinal metaplasia or dysplasia     D. ILEUM, BIOPSY:   - Ileal mucosa with no significant histologic abnormality     E. RIGHT COLON, BIOPSY:   - Colonic  mucosa with no significant histologic abnormality   - Negative for microscopic colitis     F. LEFT COLON, BIOPSY:   - Colonic mucosa with no significant histologic abnormality   - Negative for microscopic colitis     G. DISTAL RECTUM, BIOPSY:   - Scant fragments of colonic and squamous epithelium with no significant   histologic abnormality     Surgical Pathology 10/11/11 (gastric biopsy findings most c/w NSAID-associated erosion in setting of endoscopically-apparent mild gastritis)  A.  Small intestine, duodenum, biopsy       -  No histopathologic abnormality  -  No significant inflammation  -  No villous blunting  -  No microorganisms    B.  Stomach, antrum, biopsy:       -  Ulcer (see comment)  -  Superficial acute gastritis  -  Foveolar hyperplasia in non-ulcer mucosa       -  No Helicobacter pylori identified       -  No evidence of intestinal metaplasia, dysplasia, or malignancy    COMMENT:  The gastric biopsy shows superficial acute gastritis with ulcer. There  is foveolar hyperplasia and lamina propria congestion in the non-ulcer  mucosa. The biopsy does not have the background chronic inflammation we  typically see with Helicobacter gastritis, nor do we see Helicobacter  organisms. The aggregate features raise concern for chemical gastritis,  and we would wonder about alcohol or NSAID gastritis. Clinical  correlation is suggested.    ASSESSMENT/PLAN:    1. Likely Functional Abdominal Syndrome (FAS, most clinically consistent with Constipation-predominant IBS) + Pelvic Floor Dysfunction, as well as the presumed diagnosis of SIBO based on prior H2-Breath Testing and antibiotic trials - discussed next steps in evaluation and management together today  1. It was wonderful getting a chance to meet with you to discuss your likely Functional Abdominal Syndrome (FAS, most clinically consistent with Constipation-predominant IBS) + Pelvic Floor Dysfunction, as well as the presumed diagnosis of SIBO based on prior  "H2-Breath Testing and antibiotic trials - discussed next steps in evaluation and management together today.  - Discussed the largely IBS-C symptom set you're currently manifesting, which seems to be well-managed by the oral magnesium supplement you're using at the moment. Would not advise repeated antibiotic treatment for presumed SIBO for now in light of the suggestive clinical features and the lack of durable impact from prior attempts, as well as per our discussion regarding your unique C.difficile acquisition risk which could be unmasked with excessive antibiotic exposures.  - Continue your excellent multimodal approach to managing the FAS/IBS-C + Pelvic Floor Dysfunction, including continuing your mental health self-care + biofeedback/pelvic floor measures + dietary/lifestyle mindfulness.  - Discussed alternative agents (including non-stimulant laxatives and intestinal secretogogues) for managing IBS-C today, can readdress if needed in ongoing care.  - Please keep us updated on your clinical progress as you consider undertaking further medical therapy directed at your axillary Hidradenitis Suppurativa (HS).    2. Reviewed your 5/2019 EGD + Colonoscopy findings and biopsy results in detail today.  - Also reviewed the 10/2011 EGD findings and biopsy results with you in detail today, suspect the \"ulcer\" noted on the biopsy (and not visually seen on the EGD) represents an NSAID-induced erosion in the midst of active gastritis (evident on the microscopic evaluation, history of significant NSAID use at the time).  - No evidence of active H.pylori infection or overt Peptic Ulcer Disease, particularly given the history of associated NSAID exposure.    3. Could consider a trial (if appropriate) of a daily probiotic agent for FAS as we discussed today; some common options include: Align, Culturelle, Digestive Advantage, Florastor, Activia yogurt, or Kefir. Agents to help with symptoms such as IBGard could also be " considered.  - Choose one agent (or a comparable generic OTC formulation) that is affordable/palatable and use it daily consistently for at least 3-6 months to determine its baseline effect.  - Discussed consistency of use as critical for potential benefit if present, as well as limitations of the medical literature to guide use/safety/generalizability of these agents. Also advised regarding avoiding excessive cost given the marginal literature available to justify efficacy or superiority of one agent over another.  - Would advise against probiotic use in immunosuppressed patients given concerns for possible infection.    4. Continue to monitor for the danger signs/symptoms we reviewed together today:  worsening abdominal pain, worsening diarrhea, bowel/bladder obstructive symptoms (nausea/vomiting, abdominal distention, difficulty passing stool/flatus/urine), blood mixed into stools, persistent fevers/chills, progressive anemia (particularly with iron deficiency), difficulty with swallowing, perianal/rectal discomfort (particularly with defecation), unexpected weight loss, etc.  - Contact us via MyChart or phone should these symptoms occur, particularly as we may advise further evaluation accordingly.    2. Cancer Screening  No known FH CRC, colonoscopy 5/2019 negative for adenomatous lesions. Recommend starting routine CRC screening at age 50, unless symptomatic sooner.    RTC as needed with GI PA.    Thank you for this consultation. It was a pleasure to participate in the care of this patient; please contact us with any further questions.     Paulo Rodriguez MD   of Medicine  [facility] - Department of Medicine  Division of Gastroenterology

## 2019-08-12 NOTE — PATIENT INSTRUCTIONS
"I've included a brief summary of our discussion and care plan from today's visit below.  Please review this information with your primary care provider.  _______________________________________________________________________      1. It was wonderful getting a chance to meet with you to discuss your likely Functional Abdominal Syndrome (FAS, most clinically consistent with Constipation-predominant IBS) + Pelvic Floor Dysfunction, as well as the presumed diagnosis of SIBO based on prior H2-Breath Testing and antibiotic trials - discussed next steps in evaluation and management together today.  - Discussed the largely IBS-C symptom set you're currently manifesting, which seems to be well-managed by the oral magnesium supplement you're using at the moment. Would not advise repeated antibiotic treatment for presumed SIBO for now in light of the suggestive clinical features and the lack of durable impact from prior attempts, as well as per our discussion regarding your unique C.difficile acquisition risk which could be unmasked with excessive antibiotic exposures.  - Continue your excellent multimodal approach to managing the FAS/IBS-C + Pelvic Floor Dysfunction, including continuing your mental health self-care + biofeedback/pelvic floor measures + dietary/lifestyle mindfulness.  - Discussed alternative agents (including non-stimulant laxatives and intestinal secretogogues) for managing IBS-C today, can readdress if needed in ongoing care.  - Please keep us updated on your clinical progress as you consider undertaking further medical therapy directed at your axillary Hidradenitis Suppurativa (HS).    2. Reviewed your 5/2019 EGD + Colonoscopy findings and biopsy results in detail today.  - Also reviewed the 10/2011 EGD findings and biopsy results with you in detail today, suspect the \"ulcer\" noted on the biopsy (and not visually seen on the EGD) represents an NSAID-induced erosion in the midst of active gastritis " (evident on the microscopic evaluation, history of significant NSAID use at the time).  - No evidence of active H.pylori infection or overt Peptic Ulcer Disease, particularly given the history of associated NSAID exposure.    3. Could consider a trial (if appropriate) of a daily probiotic agent for FAS as we discussed today; some common options include: Align, Culturelle, Digestive Advantage, Florastor, Activia yogurt, or Kefir. Agents to help with symptoms such as IBGard could also be considered.  - Choose one agent (or a comparable generic OTC formulation) that is affordable/palatable and use it daily consistently for at least 3-6 months to determine its baseline effect.  - Discussed consistency of use as critical for potential benefit if present, as well as limitations of the medical literature to guide use/safety/generalizability of these agents. Also advised regarding avoiding excessive cost given the marginal literature available to justify efficacy or superiority of one agent over another.  - Would advise against probiotic use in immunosuppressed patients given concerns for possible infection.    4. Continue to monitor for the danger signs/symptoms we reviewed together today:  worsening abdominal pain, worsening diarrhea, bowel/bladder obstructive symptoms (nausea/vomiting, abdominal distention, difficulty passing stool/flatus/urine), blood mixed into stools, persistent fevers/chills, progressive anemia (particularly with iron deficiency), difficulty with swallowing, perianal/rectal discomfort (particularly with defecation), unexpected weight loss, etc.  - Contact us via [employer] or phone should these symptoms occur, particularly as we may advise further evaluation accordingly.    5. Return to GI Clinic with my GI Physician Assistants (Smith or Carly) as needed.  - If you are unable to schedule this follow-up appointment today, please contact our  at [phone] within the next week to help set  up this necessary appointment.    _______________________________________________________________________    It was a pleasure seeing you in clinic today - please be in touch if there are any further questions that arise following today's visit.  During business hours, you may reach Clinic Nurse Triage Line at [phone].  For urgent/emergent questions after business hours, you may reach the on-call GI Fellow by contacting the [facility]  at [phone].    Any benign/non-urgent test results are usually communicated via letter or [employer] message within 1-2 weeks after completion.  Urgent results (those that require a change in the previously-discussed care plan) are usually communicated via a phone call once available from our clinic staff to discuss the results and the next steps in your evaluation.    I recommend signing up for Cross Current access if you have not already done so and are comfortable with using a computer.  This allows for online access to your lab results and also helps you communicate efficiently with my clinic should any questions arise in your care.    We have Financial Counseling services available through our clinic.  If you have questions about your insurance coverage or payment responsibilities, particularly before you undergo any tests or procedures, please let us know and we can arrange a consultation accordingly to help you make informed decisions about your healthcare.    Sincerely,    Paulo Rodriguez MD    [facility] - Department of Medicine  Division of Gastroenterology

## 2019-08-12 NOTE — LETTER
8/12/2019       RE: Cristine Caballero  [address]  [facility] [address]     Dear Colleague,    Thank you for referring your patient, Cristine Caballero, to the [facility] GASTROENTEROLOGY AND IBD CLINIC at [facility]. Please see a copy of my visit note below.    GI CLINIC VISIT    CC/REFERRING PROVIDER: Shilpa Kahn  REASON FOR CONSULTATION: IBS    HPI: 29 year old female w/ h/o suspected Functional Abdominal Syndrome (IBS-C classification), SIBO per several prior H2-Breath Test s/p multiple abx treatments (predominantly RFX, non-durable response in setting of atypical non-diarrheal sxs), pelvic floor dysfunction (functional obstructive defecation) responsive to PT + biofeedback, bilateral axillary Hidradenitis Suppurativa, s/p diagnostic ex-lap 2011 (for endometriosis, negative for such per pt), OCD, DENAE + depression, among other medical issues - presenting for evaluation of IBS. Doing generally better overall after weaning down her polypharmacy w/ her providers (experiencing significant side effects, which have seemed to resolve some of her more chronic tremoring/GI/malaise sxs recently). Ongoing f/u w/ Dermatology to address whether reasonable to continue abx vs. trial of anti-TNF for treatment of HS (and to reduce risk for further sinus tract formation/surgery). Reports having 1-2 variable consistency stools/day (usually soft formed), +sense of incomplete evacuation at times (improved w/ observing biofeedback/PT measures for known pelvic floor dysfunction). Able to have a daily BM (usually first thing in the morning) w/ use of OTC Mg-containing OTC supplement (not requiring Miralax or other measures). Denies any tenesmus or insecurity passing flatus, no fecal incontinence or new perianal/nocturnal sxs noted. Denies any overt obstructive sxs at this time, +ongoing passage of stool/flatus. Denies any N/V/F/C/HA/NS or other const/syst/cardiopulmonary sxs, no recent  BRB/melena in stool or overt urinary changes, no unintentional wt loss or appetite/satiety changes. No other bowel/bladder habit changes, no dysphagia/odynophagia. No jaundice/icterus/pruritus, no acholic stools/steatorrhea, no new lumps/bumps, no jt pain/oral ulcer/rash/eye sxs noted.    ROS: 10pt ROS performed and otherwise negative.    PERTINENT PAST MEDICAL/SURGICAL HISTORY:  As noted above.    PERTINENT MEDICATIONS:  - oral Mg-containing OTC supplement  - Anticoagulation/Antiplatelet Agents: none.  Medications reviewed with patient today, see Medication List/Assessment for details.  No other NSAID/anticoagulation reported by patient.  No other OTC/herbal/supplements reported by patient.    SOCIAL HISTORY: Tobacco: none. Drinks 1x/month, usually 1 drink/sitting. ER Technician, currently seeking work.    FAMILY HISTORY:  Mother w/ AML, Sister w/ IBS. No colon/panc/esophageal/other GI CA, no other Smith or other HPS-related Jordy. No IBD/celiac, no other AI/liver/thyroid disease. No known FH bleeding/clotting disorders.    PHYSICAL EXAMINATION:  Vitals reviewed, AFVSS  Wt 201# today (~30# decr since 5/2019, relative  Gen: aaox3, cooperative, pleasant, not diaphoretic, nad  HEENT: ncat, neck supple, no clad/sclad, normal op w/o ulcer/exudate, anicteric, mmm  Resp/CV without acute findings, not dyspneic/tachycardic  Abd: +nabs, soft, nt, nd, no peritoneal s/s noted  Ext: no c/c/e  Skin: warm, perfused, no jaundice  Neuro: grossly intact, no asterixis noted    PERTINENT STUDIES:  Reviewed 5/15/19 EGD + COL findings and biopsy results in detail today.  Reviewed 10/11/11 EGD findings and biopsy results in detail today.    Surgical Pathology 5/15/19  A. DUODENUM, BIOPSY:   - Duodenal mucosa with no significant histologic abnormality     B. STOMACH, ANTRUM, BIOPSY:   - Features of reactive gastropathy   - No H. pylori like organisms identified on routine staining   - Negative for intestinal metaplasia or dysplasia     C.  STOMACH, BODY, BIOPSY:   - Gastric mucosa with minimal inflammation   - No H. pylori like organisms identified on routine staining   - Negative for intestinal metaplasia or dysplasia     D. ILEUM, BIOPSY:   - Ileal mucosa with no significant histologic abnormality     E. RIGHT COLON, BIOPSY:   - Colonic mucosa with no significant histologic abnormality   - Negative for microscopic colitis     F. LEFT COLON, BIOPSY:   - Colonic mucosa with no significant histologic abnormality   - Negative for microscopic colitis     G. DISTAL RECTUM, BIOPSY:   - Scant fragments of colonic and squamous epithelium with no significant   histologic abnormality     Surgical Pathology 10/11/11 (gastric biopsy findings most c/w NSAID-associated erosion in setting of endoscopically-apparent mild gastritis)  A.  Small intestine, duodenum, biopsy       -  No histopathologic abnormality  -  No significant inflammation  -  No villous blunting  -  No microorganisms    B.  Stomach, antrum, biopsy:       -  Ulcer (see comment)  -  Superficial acute gastritis  -  Foveolar hyperplasia in non-ulcer mucosa       -  No Helicobacter pylori identified       -  No evidence of intestinal metaplasia, dysplasia, or malignancy    COMMENT:  The gastric biopsy shows superficial acute gastritis with ulcer. There  is foveolar hyperplasia and lamina propria congestion in the non-ulcer  mucosa. The biopsy does not have the background chronic inflammation we  typically see with Helicobacter gastritis, nor do we see Helicobacter  organisms. The aggregate features raise concern for chemical gastritis,  and we would wonder about alcohol or NSAID gastritis. Clinical  correlation is suggested.    ASSESSMENT/PLAN:    1. Likely Functional Abdominal Syndrome (FAS, most clinically consistent with Constipation-predominant IBS) + Pelvic Floor Dysfunction, as well as the presumed diagnosis of SIBO based on prior H2-Breath Testing and antibiotic trials - discussed next steps in  "evaluation and management together today  1. It was wonderful getting a chance to meet with you to discuss your likely Functional Abdominal Syndrome (FAS, most clinically consistent with Constipation-predominant IBS) + Pelvic Floor Dysfunction, as well as the presumed diagnosis of SIBO based on prior H2-Breath Testing and antibiotic trials - discussed next steps in evaluation and management together today.  - Discussed the largely IBS-C symptom set you're currently manifesting, which seems to be well-managed by the oral magnesium supplement you're using at the moment. Would not advise repeated antibiotic treatment for presumed SIBO for now in light of the suggestive clinical features and the lack of durable impact from prior attempts, as well as per our discussion regarding your unique C.difficile acquisition risk which could be unmasked with excessive antibiotic exposures.  - Continue your excellent multimodal approach to managing the FAS/IBS-C + Pelvic Floor Dysfunction, including continuing your mental health self-care + biofeedback/pelvic floor measures + dietary/lifestyle mindfulness.  - Discussed alternative agents (including non-stimulant laxatives and intestinal secretogogues) for managing IBS-C today, can readdress if needed in ongoing care.  - Please keep us updated on your clinical progress as you consider undertaking further medical therapy directed at your axillary Hidradenitis Suppurativa (HS).    2. Reviewed your 5/2019 EGD + Colonoscopy findings and biopsy results in detail today.  - Also reviewed the 10/2011 EGD findings and biopsy results with you in detail today, suspect the \"ulcer\" noted on the biopsy (and not visually seen on the EGD) represents an NSAID-induced erosion in the midst of active gastritis (evident on the microscopic evaluation, history of significant NSAID use at the time).  - No evidence of active H.pylori infection or overt Peptic Ulcer Disease, particularly given the history of " associated NSAID exposure.    3. Could consider a trial (if appropriate) of a daily probiotic agent for FAS as we discussed today; some common options include: Align, Culturelle, Digestive Advantage, Florastor, Activia yogurt, or Kefir. Agents to help with symptoms such as IBGard could also be considered.  - Choose one agent (or a comparable generic OTC formulation) that is affordable/palatable and use it daily consistently for at least 3-6 months to determine its baseline effect.  - Discussed consistency of use as critical for potential benefit if present, as well as limitations of the medical literature to guide use/safety/generalizability of these agents. Also advised regarding avoiding excessive cost given the marginal literature available to justify efficacy or superiority of one agent over another.  - Would advise against probiotic use in immunosuppressed patients given concerns for possible infection.    4. Continue to monitor for the danger signs/symptoms we reviewed together today:  worsening abdominal pain, worsening diarrhea, bowel/bladder obstructive symptoms (nausea/vomiting, abdominal distention, difficulty passing stool/flatus/urine), blood mixed into stools, persistent fevers/chills, progressive anemia (particularly with iron deficiency), difficulty with swallowing, perianal/rectal discomfort (particularly with defecation), unexpected weight loss, etc.  - Contact us via MyChart or phone should these symptoms occur, particularly as we may advise further evaluation accordingly.    2. Cancer Screening  No known FH CRC, colonoscopy 5/2019 negative for adenomatous lesions. Recommend starting routine CRC screening at age 50, unless symptomatic sooner.    RTC as needed with GI PA.    Thank you for this consultation. It was a pleasure to participate in the care of this patient; please contact us with any further questions.     Paulo Rodriguez MD   of Medicine  [facility] -  Department of Medicine  Division of Gastroenterology

## 2019-08-12 NOTE — NURSING NOTE
"Chief Complaint   Patient presents with     Consult     Abdominal Pain       Vitals:    08/12/19 1501   BP: (!) 134/97   BP Location: Right arm   Patient Position: Sitting   Cuff Size: Adult Large   Pulse: 108   Resp: 16   Temp: 98.9  F (37.2  C)   TempSrc: Oral   SpO2: 96%   Weight: 91.5 kg (201 lb 12.8 oz)   Height: 1.655 m (5' 5.16\")       Body mass index is 33.42 kg/m .      Anitha Tellez LPN                          "

## 2019-08-13 ENCOUNTER — OFFICE VISIT (OUTPATIENT)
Dept: ALLERGY | Facility: CLINIC | Age: 29
End: 2019-08-13
Payer: COMMERCIAL

## 2019-08-13 ENCOUNTER — PRE VISIT (OUTPATIENT)
Dept: ALLERGY | Facility: CLINIC | Age: 29
End: 2019-08-13

## 2019-08-13 DIAGNOSIS — L50.9 URTICARIA: Primary | ICD-10-CM

## 2019-08-13 DIAGNOSIS — L73.2 HIDRADENITIS SUPPURATIVA: ICD-10-CM

## 2019-08-13 RX ORDER — FEXOFENADINE HCL 180 MG/1
180 TABLET ORAL DAILY
Qty: 30 TABLET | Refills: 3 | Status: CANCELLED | OUTPATIENT
Start: 2019-08-13

## 2019-08-13 ASSESSMENT — PAIN SCALES - GENERAL: PAINLEVEL: NO PAIN (0)

## 2019-08-13 NOTE — PATIENT INSTRUCTIONS
Impression/Plan:    # Hidradenitis suppurativa  GI specialist would like to avoid antibiotics  Patient would like to avoid immunosuppressants including Humira and other biologics    Iodine 10% soap scrub (purchase OTC)    Consider isotretinoin, will need iPledge visit with general dermatology (PA)    Refer to Dr. Hammer     # Urticaria with environmental allergies    Atopy skin test = house dust mites, cat, cockroach and weed/tree pollens    Allegra 180 mg QAM

## 2019-08-13 NOTE — NURSING NOTE
Dermatology Rooming Note    Cristine Caballero's goals for this visit include:   Chief Complaint   Patient presents with     Allergies     Ali is here for allergy consult .Posssible allergy to cigarettes ,dogs and cats .HS      Pooja Collado, CMA

## 2019-08-13 NOTE — LETTER
8/13/2019         RE: Cristine Caballero  [address]  [facility] [address]        Dear Colleague,    Thank you for referring your patient, Cristine Caballero, to the [facility] ALLERGY. Please see a copy of my visit note below.    [facility] Dermatology Note    Dermatology Problem List:  # Hidradenitis suppurativa   # Urticarial reaction to environmental allergies    Encounter Date: Aug 13, 2019    CC:   Chief Complaint   Patient presents with     Allergies     Ali is here for allergy consult .Posssible allergy to cigarettes ,dogs and cats .HS        History of Present Illness:  Ms. Cristine Caballero is a 29 year old female with history of irritable bowel syndrome, small intestinal bacterial overgrowth, pelvic floor dysfunction, OCD, depression, hidradenitis suppurativa presenting referred by herself for allergy consult  Patient originally called to get an appointment for her hidradenitis suppurative, but was told by the  that she could be seen in allergy clinic since it is under dermatology    In regards to allergies, patient states that she sometimes get a skin rash with exposure to certain dogs  When Effexor and propranolol were discontinued, her roommate got a dog; when dog licks the patient, she get a rash within the next hour; she immediately washes her skin with soap  When taking care of her friend's cat, she also had a skin rash in the past  States that she had a Staph infection in her left axillae 04/2019, was prescribed an antibiotic cream (possibly clindamycin), which she attributes to causing her HS  Diagnosed 08/2019 with HS  Patient also states that she vomits within 20 minutes when she smells cigarette smoke on the light rail; also vomiting immediately after exposure to odor from homeless individuals  States that she saw an allergist around 2011 in a private skin, may have undergone skin testing which was reportedly positive only for dust mites  Reportedly was  diagnosed clinically with psoriasis on her ankles by her primary care doctor    Past Medical History:   Patient Active Problem List   Diagnosis     Generalized anxiety disorder     Moderate recurrent major depression (H)     Environmental allergies     Irritable bowel syndrome     Small intestinal bacterial overgrowth     GBS (group B streptococcus) infection     Abdominal pain     OCD (obsessive compulsive disorder)     Chronic low back pain without sciatica     Bilateral hip pain     Pelvic floor dysfunction     History of hidradenitis suppurativa     Past Medical History:   Diagnosis Date     Anxiety disorder      Depressive disorder      Environmental allergies      Gastroesophageal reflux disease      Hx of eating disorder      Hypertension     elevated since propanolol discontinued     IBS (irritable bowel syndrome)      OCD (obsessive compulsive disorder)     stable with CBT      Small intestinal bacterial overgrowth 2018    pt reported.      Suicide attempt by multiple drug overdose (H) 4.21.2016     Vitamin D deficiency      Past Surgical History:   Procedure Laterality Date     ABDOMEN SURGERY  01/2012    Exploratory Laparoscopy     biopsy  8-    vulvar     BIOPSY      GI, vulva- OK     COLONOSCOPY  2011     COLONOSCOPY N/A 5/15/2019    Procedure: Colonoscopy, With Polypectomy And Biopsy;  Surgeon: Perico Meek MD;  Location: [facility] OR     COLONOSCOPY WITH CO2 INSUFFLATION N/A 5/15/2019    Procedure: COLONOSCOPY, WITH CO2 INSUFFLATION;  Surgeon: Perico Meek MD;  Location: [facility] OR     COMBINED ESOPHAGOSCOPY, GASTROSCOPY, DUODENOSCOPY (EGD) WITH CO2 INSUFFLATION N/A 5/15/2019    Procedure: ESOPHAGOGASTRODUODENOSCOPY, WITH CO2 INSUFFLATION;  Surgeon: Perico Meek MD;  Location: [facility] OR     DENTAL SURGERY  2011     ENT SURGERY  2014    intubation & NG tube     ESOPHAGOSCOPY, GASTROSCOPY, DUODENOSCOPY (EGD), COMBINED  10/11/2011    Procedure:COMBINED ESOPHAGOSCOPY, GASTROSCOPY, DUODENOSCOPY (EGD),  BIOPSY SINGLE OR MULTIPLE; Surgeon:TALA MONTEMAYOR; Location:[facility]     ESOPHAGOSCOPY, GASTROSCOPY, DUODENOSCOPY (EGD), COMBINED N/A 5/15/2019    Procedure: Esophagogastroduodenoscopy, With Biopsy;  Surgeon: Perico Meek MD;  Location: [facility] OR     HC BREATH HYDROGEN TEST  10/4/2011    Procedure:HYDROGEN BREATH TEST; Surgeon:RADHA EPSTEIN; Location:[facility]     LAPAROSCOPY       ORTHOPEDIC SURGERY      left wrist- screw in left scaphiod     TONSILLECTOMY       WRIST SURGERY  2004    screw placed L wrist after fracture     Social History:  Patient reports that she has never smoked. She has never used smokeless tobacco. She reports that she drinks about 1.8 - 3.6 oz of alcohol per week. She reports that she does not use drugs.  Previously worked at [employer]) and currently works as a ED tech at [employer]  Planning to continue courses    Family History:  Family History   Problem Relation Age of Onset     Depression Mother      Allergies Mother      Asthma Mother      Thyroid Disease Mother      Eye Disorder Mother      Blood Disease Mother          essential thrombocytosis     Cancer Mother         AML, recent BMT     Anxiety Disorder Mother      Mental Illness Mother         Hoarding- OCD     Depression Sister      Anxiety Disorder Sister      Mental Illness Sister         OCD_ HOarding     Genitourinary Problems Sister      Asthma Sister      Eye Disorder Sister      Allergies Sister      Allergies Sister      Mental Illness Sister         ADHD     Depression Maternal Grandmother      Heart Disease Maternal Grandmother      Thyroid Disease Maternal Grandmother      Depression Other         aunt     Anxiety Disorder Other      Genitourinary Problems Other         aunt     Suicide Other      Heart Disease Maternal Grandfather      C.A.D. Maternal Grandfather      C.A.D. Paternal Grandfather      Cancer Paternal Grandfather      Eye Disorder Father      Mental Illness Father         OCPD?     Allergies Other         uncles      Mother reportedly had many allergies  Sister takes antihistamines    Medications:  Current Outpatient Medications   Medication Sig Dispense Refill     acetaminophen (ACETAMINOPHEN EXTRA STRENGTH) 500 MG tablet Take 500-1,000 mg by mouth every 4 hours as needed for mild pain       calcium carbonate 750 MG CHEW Take 4 chew tab by mouth daily        Cholecalciferol (VITAMIN D) 2000 UNITS tablet Take 1 tablet by mouth daily Take one tablet daily. (Patient not taking: Reported on 8/12/2019) 90 tablet 3     hydrOXYzine (ATARAX) 25 MG tablet Take 25 mg by mouth daily as needed for itching       hydrOXYzine (VISTARIL) 25 MG capsule Take 75 mg by mouth nightly as needed        levonorgestrel (KYLEENA) 19.5 MG IUD 1 each (19.5 mg) by Intrauterine route once for 1 dose (Patient taking differently: 1 each by Intrauterine route once ) 1 each 0     Nutritional Supplements (NUTRITIONAL SUPPLEMENT PO) CAlm-magnesium carbonate, glycinate       Omega-3 Fatty Acids (OMEGA-3 FISH OIL PO) Take 2 g by mouth 2 times daily        omeprazole (PRILOSEC) 40 MG DR capsule Take 1 capsule (40 mg) by mouth every morning On an empty stomach (Patient not taking: Reported on 8/12/2019) 90 capsule 0     polyethylene glycol (MIRALAX/GLYCOLAX) packet Take 1 packet by mouth daily       propranolol (INDERAL LA) 80 MG 24 hr capsule Take 80 mg by mouth At Bedtime        propranolol (INDERAL) 40 MG tablet Take 40 mg by mouth 3 times daily       simethicone (MYLICON) 125 MG chewable tablet Take by mouth 2 times daily as needed for intestinal gas 3 tabs       Venlafaxine HCl (EFFEXOR PO) Take 300 mg by mouth daily Take 2- 150mg  tablets        Allergies:  Allergies   Allergen Reactions     Betadex Nausea and Vomiting     Codeine Other (See Comments)     Other reaction(s): Tremors     Penicillin G      Sulfa Drugs      Fentanyl Rash     Shauna oral rash after fentanyl during egd in 2011     Review of Systems:  10 point review of systems including  constitutional, HEENT, CV, pulmonary, GI, musculoskeletal, neurologic, endocrine, hematologic/immunologic was performed and was negative.  Constitutional: Otherwise feeling well today, in usual state of health.  HEENT: Patient denies nonhealing oral sores.    Physical exam:  Vitals: There were no vitals taken for this visit.  General: in no acute distress, well-developed, well-nourished  Eyes: conjunctivae clear  Neck: supple, no lymphadenopathy  Pulmonary: breathing comfortably in no distress  CV: well-perfused, no cyanosis  Abdominal: no distension  Extremities: no deformity, no edema    Skin:   Skin examined including inspection and palpation of the skin and subcutaneous tissues of the bilateral axillae, arms, face  In the bilateral axillae, there are inflammatory violaceous and sclerotic nodules, rare sinus tracts    Atopy Screen (Placed 08/13/19 )    No Substance Readings (15 min) Evaluation   POS Histamine 1mg/ml ++    NEG NaCl 0.9% -      No Substance Readings (15 min) Evaluation   1 Alternaria alternata (tenuis)  -    2 Cladosporium herbarum -    3 Aspergillus fumigatus -    4 Penicillium notatum -    5 Dermatophagoides pteronyssinus -    6 Dermatophagoides farinae (+)    7 Dog epithelium (canis spp) -    8 Cat hair (nia catus) (+)    9 Cockroach   (Blatella americana & germanica) +    10 Grass mix midwest   (Samantha, Orchard, Redtop, Radames) -    11 Jorge grass (sorghum halepense) -    12 Weed mix   (common Cocklebur, Lamb s quarters, rough redroot Pigweed, Dock/Sorrel) ++    13 Mug wort (artemisia vulgare) ++    14 Ragweed giant/short (ambrosia spp) ++    15 English Plantain (plantago lanceolata) -    16 Tree mix 1 (Pecan, Maple BHR, Oak RVW, american [place], black [place]) -    17 Red cedar (juniperus virginia) -    18 Tree mix 2   (white Willi, river/red Birch, black [place], common [place], american Elm) -    19 Box elder/Maple mix (acer spp) +    20 [place] shagbark (carya ovata) -       -       Conclusion: patient is atopic and seems to have relevant allergies to weed and some tree pollens and to cat and dust mite/cockroach.      Impression/Plan:    # Hidradenitis suppurativa  GI specialist would like to avoid antibiotics  Patient would like to avoid immunosuppressants including Humira and other biologics    Iodine 10% soap scrub (purchase OTC)    Consider isotretinoin, will need iPledge visit with general dermatology ([place])    Refer to Dr. Hammer     # Urticaria with environmental allergies    Atopy skin test = house dust mites, cat, cockroach and weed/tree pollens    Allegra 180 mg QAM    CC Frank Stack MD  [address] [place] [address] on close of this encounter.    Follow-up in in the next few weeks with Bhavana (PA) for isotretinoin and Dr. Hammer for HS follow up when available    Dr. Marte staffed the patient.    Staff Physician Comments:  I saw and evaluated the patient with the resident and I agree with the assessment and plan as documented in the resident's note.    Perico Marte MD  Professor  Head of Dermato-Allergy Division  Department of Dermatology  [facility], [place], [place]      Staff Involved:  Resident/Staff    Paulo Falk MD  Dermatology Resident, PGY2  [facility]      Again, thank you for allowing me to participate in the care of your patient.        Sincerely,        Perico Marte MD

## 2019-08-13 NOTE — PROGRESS NOTES
[facility] Dermatology Note    Dermatology Problem List:  # Hidradenitis suppurativa   # Urticarial reaction to environmental allergies    Encounter Date: Aug 13, 2019    CC:   Chief Complaint   Patient presents with     Allergies     Ali is here for allergy consult .Posssible allergy to cigarettes ,dogs and cats .HS        History of Present Illness:  Ms. Cristine Caballero is a 29 year old female with history of irritable bowel syndrome, small intestinal bacterial overgrowth, pelvic floor dysfunction, OCD, depression, hidradenitis suppurativa presenting referred by herself for allergy consult  Patient originally called to get an appointment for her hidradenitis suppurative, but was told by the  that she could be seen in allergy clinic since it is under dermatology    In regards to allergies, patient states that she sometimes get a skin rash with exposure to certain dogs  When Effexor and propranolol were discontinued, her roommate got a dog; when dog licks the patient, she get a rash within the next hour; she immediately washes her skin with soap  When taking care of her friend's cat, she also had a skin rash in the past  States that she had a Staph infection in her left axillae 04/2019, was prescribed an antibiotic cream (possibly clindamycin), which she attributes to causing her HS  Diagnosed 08/2019 with HS  Patient also states that she vomits within 20 minutes when she smells cigarette smoke on the light rail; also vomiting immediately after exposure to odor from homeless individuals  States that she saw an allergist around 2011 in a private skin, may have undergone skin testing which was reportedly positive only for dust mites  Reportedly was diagnosed clinically with psoriasis on her ankles by her primary care doctor    Past Medical History:   Patient Active Problem List   Diagnosis     Generalized anxiety disorder     Moderate recurrent major depression (H)     Environmental  allergies     Irritable bowel syndrome     Small intestinal bacterial overgrowth     GBS (group B streptococcus) infection     Abdominal pain     OCD (obsessive compulsive disorder)     Chronic low back pain without sciatica     Bilateral hip pain     Pelvic floor dysfunction     History of hidradenitis suppurativa     Past Medical History:   Diagnosis Date     Anxiety disorder      Depressive disorder      Environmental allergies      Gastroesophageal reflux disease      Hx of eating disorder      Hypertension     elevated since propanolol discontinued     IBS (irritable bowel syndrome)      OCD (obsessive compulsive disorder)     stable with CBT      Small intestinal bacterial overgrowth 2018    pt reported.      Suicide attempt by multiple drug overdose (H) 4.21.2016     Vitamin D deficiency      Past Surgical History:   Procedure Laterality Date     ABDOMEN SURGERY  01/2012    Exploratory Laparoscopy     biopsy  8-    vulvar     BIOPSY      GI, vulva- OK     COLONOSCOPY  2011     COLONOSCOPY N/A 5/15/2019    Procedure: Colonoscopy, With Polypectomy And Biopsy;  Surgeon: Perico Meek MD;  Location: [facility]     COLONOSCOPY WITH CO2 INSUFFLATION N/A 5/15/2019    Procedure: COLONOSCOPY, WITH CO2 INSUFFLATION;  Surgeon: Perico Meek MD;  Location: [facility]     COMBINED ESOPHAGOSCOPY, GASTROSCOPY, DUODENOSCOPY (EGD) WITH CO2 INSUFFLATION N/A 5/15/2019    Procedure: ESOPHAGOGASTRODUODENOSCOPY, WITH CO2 INSUFFLATION;  Surgeon: Perico Meek MD;  Location: [facility]     DENTAL SURGERY  2011     ENT SURGERY  2014    intubation & NG tube     ESOPHAGOSCOPY, GASTROSCOPY, DUODENOSCOPY (EGD), COMBINED  10/11/2011    Procedure:COMBINED ESOPHAGOSCOPY, GASTROSCOPY, DUODENOSCOPY (EGD), BIOPSY SINGLE OR MULTIPLE; Surgeon:TALA MONTEMAYOR; Location:[facility]     ESOPHAGOSCOPY, GASTROSCOPY, DUODENOSCOPY (EGD), COMBINED N/A 5/15/2019    Procedure: Esophagogastroduodenoscopy, With Biopsy;  Surgeon: Perico Meek MD;  Location: [facility]      HC BREATH HYDROGEN TEST  10/4/2011    Procedure:HYDROGEN BREATH TEST; Surgeon:RADHA EPSTEIN; Location:[facility]     LAPAROSCOPY       ORTHOPEDIC SURGERY      left wrist- screw in left scaphiod     TONSILLECTOMY       WRIST SURGERY  2004    screw placed L wrist after fracture     Social History:  Patient reports that she has never smoked. She has never used smokeless tobacco. She reports that she drinks about 1.8 - 3.6 oz of alcohol per week. She reports that she does not use drugs.  Previously worked at [employer] and currently works as a enymotion tech at [employer]  Planning to continue courses    Family History:  Family History   Problem Relation Age of Onset     Depression Mother      Allergies Mother      Asthma Mother      Thyroid Disease Mother      Eye Disorder Mother      Blood Disease Mother          essential thrombocytosis     Cancer Mother         AML, recent BMT     Anxiety Disorder Mother      Mental Illness Mother         Hoarding- OCD     Depression Sister      Anxiety Disorder Sister      Mental Illness Sister         OCD_ HOarding     Genitourinary Problems Sister      Asthma Sister      Eye Disorder Sister      Allergies Sister      Allergies Sister      Mental Illness Sister         ADHD     Depression Maternal Grandmother      Heart Disease Maternal Grandmother      Thyroid Disease Maternal Grandmother      Depression Other         aunt     Anxiety Disorder Other      Genitourinary Problems Other         aunt     Suicide Other      Heart Disease Maternal Grandfather      C.A.D. Maternal Grandfather      C.A.D. Paternal Grandfather      Cancer Paternal Grandfather      Eye Disorder Father      Mental Illness Father         OCPD?     Allergies Other         uncles     Mother reportedly had many allergies  Sister takes antihistamines    Medications:  Current Outpatient Medications   Medication Sig Dispense Refill     acetaminophen (ACETAMINOPHEN EXTRA STRENGTH) 500 MG tablet Take 500-1,000 mg by mouth every 4  hours as needed for mild pain       calcium carbonate 750 MG CHEW Take 4 chew tab by mouth daily        Cholecalciferol (VITAMIN D) 2000 UNITS tablet Take 1 tablet by mouth daily Take one tablet daily. (Patient not taking: Reported on 8/12/2019) 90 tablet 3     hydrOXYzine (ATARAX) 25 MG tablet Take 25 mg by mouth daily as needed for itching       hydrOXYzine (VISTARIL) 25 MG capsule Take 75 mg by mouth nightly as needed        levonorgestrel (KYLEENA) 19.5 MG IUD 1 each (19.5 mg) by Intrauterine route once for 1 dose (Patient taking differently: 1 each by Intrauterine route once ) 1 each 0     Nutritional Supplements (NUTRITIONAL SUPPLEMENT PO) CAlm-magnesium carbonate, glycinate       Omega-3 Fatty Acids (OMEGA-3 FISH OIL PO) Take 2 g by mouth 2 times daily        omeprazole (PRILOSEC) 40 MG DR capsule Take 1 capsule (40 mg) by mouth every morning On an empty stomach (Patient not taking: Reported on 8/12/2019) 90 capsule 0     polyethylene glycol (MIRALAX/GLYCOLAX) packet Take 1 packet by mouth daily       propranolol (INDERAL LA) 80 MG 24 hr capsule Take 80 mg by mouth At Bedtime        propranolol (INDERAL) 40 MG tablet Take 40 mg by mouth 3 times daily       simethicone (MYLICON) 125 MG chewable tablet Take by mouth 2 times daily as needed for intestinal gas 3 tabs       Venlafaxine HCl (EFFEXOR PO) Take 300 mg by mouth daily Take 2- 150mg  tablets        Allergies:  Allergies   Allergen Reactions     Betadex Nausea and Vomiting     Codeine Other (See Comments)     Other reaction(s): Tremors     Penicillin G      Sulfa Drugs      Fentanyl Rash     Shauna oral rash after fentanyl during egd in 2011     Review of Systems:  10 point review of systems including constitutional, HEENT, CV, pulmonary, GI, musculoskeletal, neurologic, endocrine, hematologic/immunologic was performed and was negative.  Constitutional: Otherwise feeling well today, in usual state of health.  HEENT: Patient denies nonhealing oral  sores.    Physical exam:  Vitals: There were no vitals taken for this visit.  General: in no acute distress, well-developed, well-nourished  Eyes: conjunctivae clear  Neck: supple, no lymphadenopathy  Pulmonary: breathing comfortably in no distress  CV: well-perfused, no cyanosis  Abdominal: no distension  Extremities: no deformity, no edema    Skin:   Skin examined including inspection and palpation of the skin and subcutaneous tissues of the bilateral axillae, arms, face  In the bilateral axillae, there are inflammatory violaceous and sclerotic nodules, rare sinus tracts    Atopy Screen (Placed 08/13/19 )    No Substance Readings (15 min) Evaluation   POS Histamine 1mg/ml ++    NEG NaCl 0.9% -      No Substance Readings (15 min) Evaluation   1 Alternaria alternata (tenuis)  -    2 Cladosporium herbarum -    3 Aspergillus fumigatus -    4 Penicillium notatum -    5 Dermatophagoides pteronyssinus -    6 Dermatophagoides farinae (+)    7 Dog epithelium (canis spp) -    8 Cat hair (nia catus) (+)    9 Cockroach   (Blatella americana & germanica) +    10 Grass mix midwest   (Samantha, Orchard, Redtop, Radames) -    11 Jorge grass (sorghum halepense) -    12 Weed mix   (common Cocklebur, Lamb s quarters, rough redroot Pigweed, Dock/Sorrel) ++    13 Mug wort (artemisia vulgare) ++    14 Ragweed giant/short (ambrosia spp) ++    15 English Plantain (plantago lanceolata) -    16 Tree mix 1 (Pecan, Maple BHR, Oak RVW, american Olmito, black Burwell) -    17 Red cedar (juniperus virginia) -    18 Tree mix 2   (white Willi, river/red Birch, black Richardton, common Fayette, american Elm) -    19 Box elder/Maple mix (acer spp) +    20 McDowell shagbark (carya ovata) -       -      Conclusion: patient is atopic and seems to have relevant allergies to weed and some tree pollens and to cat and dust mite/cockroach.      Impression/Plan:    # Hidradenitis suppurativa  GI specialist would like to avoid antibiotics  Patient would like to  avoid immunosuppressants including Humira and other biologics    Iodine 10% soap scrub (purchase OTC)    Consider isotretinoin, will need iPledge visit with general dermatology ([facility])    Refer to Dr. Hammer     # Urticaria with environmental allergies    Atopy skin test = house dust mites, cat, cockroach and weed/tree pollens    Allegra 180 mg QAM    CC Frank Stack MD  [address] [place] [address] on close of this encounter.    Follow-up in in the next few weeks with Bhavana ([facility]) for isotretinoin and Dr. Hammer for HS follow up when available    Dr. Marte staffed the patient.    Staff Physician Comments:  I saw and evaluated the patient with the resident and I agree with the assessment and plan as documented in the resident's note.    Perico Marte MD  Professor  Head of Dermato-Allergy Division  Department of Dermatology  [facility], [place], [place]      Staff Involved:  Resident/Staff    Paulo Falk MD  Dermatology Resident, PGY2  [facility]

## 2019-08-14 ASSESSMENT — ANXIETY QUESTIONNAIRES
5. BEING SO RESTLESS THAT IT IS HARD TO SIT STILL: NOT AT ALL
2. NOT BEING ABLE TO STOP OR CONTROL WORRYING: SEVERAL DAYS
GAD7 TOTAL SCORE: 13
7. FEELING AFRAID AS IF SOMETHING AWFUL MIGHT HAPPEN: NEARLY EVERY DAY
3. WORRYING TOO MUCH ABOUT DIFFERENT THINGS: SEVERAL DAYS
6. BECOMING EASILY ANNOYED OR IRRITABLE: MORE THAN HALF THE DAYS
1. FEELING NERVOUS, ANXIOUS, OR ON EDGE: NEARLY EVERY DAY
4. TROUBLE RELAXING: NEARLY EVERY DAY
7. FEELING AFRAID AS IF SOMETHING AWFUL MIGHT HAPPEN: NEARLY EVERY DAY
GAD7 TOTAL SCORE: 13
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
## 2019-08-15 ENCOUNTER — TELEPHONE (OUTPATIENT)
Dept: ALLERGY | Facility: CLINIC | Age: 29
End: 2019-08-15

## 2019-08-15 ENCOUNTER — OFFICE VISIT (OUTPATIENT)
Dept: OBGYN | Facility: CLINIC | Age: 29
End: 2019-08-15
Attending: NURSE PRACTITIONER
Payer: COMMERCIAL

## 2019-08-15 VITALS
BODY MASS INDEX: 33.26 KG/M2 | SYSTOLIC BLOOD PRESSURE: 132 MMHG | HEIGHT: 65 IN | HEART RATE: 74 BPM | DIASTOLIC BLOOD PRESSURE: 83 MMHG | WEIGHT: 199.6 LBS

## 2019-08-15 DIAGNOSIS — Z11.3 SCREEN FOR STD (SEXUALLY TRANSMITTED DISEASE): ICD-10-CM

## 2019-08-15 DIAGNOSIS — N94.10 DYSPAREUNIA IN FEMALE: Primary | ICD-10-CM

## 2019-08-15 DIAGNOSIS — F43.23 SITUATIONAL MIXED ANXIETY AND DEPRESSIVE DISORDER: ICD-10-CM

## 2019-08-15 DIAGNOSIS — B37.31 VULVOVAGINAL CANDIDIASIS: ICD-10-CM

## 2019-08-15 LAB
CLUE CELLS: NEGATIVE
HCV AB SERPL QL IA: NONREACTIVE
HIV 1+2 AB+HIV1 P24 AG SERPL QL IA: NONREACTIVE
TRICHOMONAS (WET PREP): NEGATIVE
YEAST (WET PREP): POSITIVE

## 2019-08-15 PROCEDURE — 86780 TREPONEMA PALLIDUM: CPT | Performed by: NURSE PRACTITIONER

## 2019-08-15 PROCEDURE — 87591 N.GONORRHOEAE DNA AMP PROB: CPT | Performed by: NURSE PRACTITIONER

## 2019-08-15 PROCEDURE — 87210 SMEAR WET MOUNT SALINE/INK: CPT | Mod: ZF | Performed by: NURSE PRACTITIONER

## 2019-08-15 PROCEDURE — 87389 HIV-1 AG W/HIV-1&-2 AB AG IA: CPT | Performed by: NURSE PRACTITIONER

## 2019-08-15 PROCEDURE — G0463 HOSPITAL OUTPT CLINIC VISIT: HCPCS | Mod: ZF

## 2019-08-15 PROCEDURE — 86803 HEPATITIS C AB TEST: CPT | Performed by: NURSE PRACTITIONER

## 2019-08-15 PROCEDURE — 87491 CHLMYD TRACH DNA AMP PROBE: CPT | Performed by: NURSE PRACTITIONER

## 2019-08-15 PROCEDURE — 36415 COLL VENOUS BLD VENIPUNCTURE: CPT | Performed by: NURSE PRACTITIONER

## 2019-08-15 RX ORDER — FLUCONAZOLE 150 MG/1
150 TABLET ORAL ONCE
Qty: 1 TABLET | Refills: 0 | Status: SHIPPED | OUTPATIENT
Start: 2019-08-15 | End: 2019-08-15 | Stop reason: ALTCHOICE

## 2019-08-15 ASSESSMENT — PATIENT HEALTH QUESTIONNAIRE - PHQ9: 5. POOR APPETITE OR OVEREATING: NEARLY EVERY DAY

## 2019-08-15 ASSESSMENT — ANXIETY QUESTIONNAIRES
3. WORRYING TOO MUCH ABOUT DIFFERENT THINGS: SEVERAL DAYS
7. FEELING AFRAID AS IF SOMETHING AWFUL MIGHT HAPPEN: NEARLY EVERY DAY
1. FEELING NERVOUS, ANXIOUS, OR ON EDGE: NEARLY EVERY DAY
6. BECOMING EASILY ANNOYED OR IRRITABLE: NEARLY EVERY DAY
2. NOT BEING ABLE TO STOP OR CONTROL WORRYING: SEVERAL DAYS
GAD7 TOTAL SCORE: 14
5. BEING SO RESTLESS THAT IT IS HARD TO SIT STILL: NOT AT ALL

## 2019-08-15 ASSESSMENT — MIFFLIN-ST. JEOR: SCORE: 1626.26

## 2019-08-15 NOTE — PATIENT INSTRUCTIONS
Treat yeast infection with 1 Diflucan by mouth today    Continue pelvic floor PT    STD tests completed today    May try lidocaine gel 5-10 minutes before using dilators, tampons, etc.     Continue psychotherapy    Schedule preventative health exam (physical) in 2-3 months; pap smear will be done at that time
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
## 2019-08-15 NOTE — TELEPHONE ENCOUNTER
[facility] Call Center    Phone Message    May a detailed message be left on voicemail: yes    Reason for Call: Other: Pt was told someone will be calling her about scheduling an appt with Dr. Hammer so she can start on isotretinoin. Please call pt asap. Thank you.     Action Taken: Message routed to:  [facility] ([facility]): Allergy

## 2019-08-16 LAB
C TRACH DNA SPEC QL NAA+PROBE: NEGATIVE
N GONORRHOEA DNA SPEC QL NAA+PROBE: NEGATIVE
SPECIMEN SOURCE: NORMAL
SPECIMEN SOURCE: NORMAL
T PALLIDUM AB SER QL: NONREACTIVE

## 2019-09-17 PROBLEM — M25.552 BILATERAL HIP PAIN: Status: RESOLVED | Noted: 2019-07-26 | Resolved: 2019-09-17

## 2019-09-17 PROBLEM — M54.50 CHRONIC LOW BACK PAIN WITHOUT SCIATICA: Status: RESOLVED | Noted: 2019-07-26 | Resolved: 2019-09-17

## 2019-09-17 PROBLEM — G89.29 CHRONIC LOW BACK PAIN WITHOUT SCIATICA: Status: RESOLVED | Noted: 2019-07-26 | Resolved: 2019-09-17

## 2019-09-17 PROBLEM — M25.551 BILATERAL HIP PAIN: Status: RESOLVED | Noted: 2019-07-26 | Resolved: 2019-09-17

## 2019-10-24 ENCOUNTER — TRANSFERRED RECORDS (OUTPATIENT)
Dept: HEALTH INFORMATION MANAGEMENT | Facility: CLINIC | Age: 29
End: 2019-10-24

## 2019-10-24 ENCOUNTER — NURSE TRIAGE (OUTPATIENT)
Dept: NURSING | Facility: CLINIC | Age: 29
End: 2019-10-24

## 2019-10-24 NOTE — TELEPHONE ENCOUNTER
Patient calling with multiple sx and concerns. She denies triage. She is concerned about mold exposure. Has many sx. Spent a great deal of time on the phone going over possible causes. Advised ER.  Breana Mendez RN [facility]

## 2019-10-26 ENCOUNTER — HOSPITAL ENCOUNTER (EMERGENCY)
Facility: CLINIC | Age: 29
Discharge: HOME | End: 2019-10-26
Attending: INTERNAL MEDICINE | Admitting: INTERNAL MEDICINE
Payer: COMMERCIAL

## 2019-10-26 ENCOUNTER — APPOINTMENT (OUTPATIENT)
Dept: GENERAL RADIOLOGY | Facility: CLINIC | Age: 29
End: 2019-10-26
Attending: INTERNAL MEDICINE
Payer: COMMERCIAL

## 2019-10-26 VITALS
OXYGEN SATURATION: 99 % | HEIGHT: 66 IN | BODY MASS INDEX: 32.22 KG/M2 | RESPIRATION RATE: 12 BRPM | HEART RATE: 92 BPM | SYSTOLIC BLOOD PRESSURE: 126 MMHG | TEMPERATURE: 98.8 F | DIASTOLIC BLOOD PRESSURE: 92 MMHG

## 2019-10-26 DIAGNOSIS — R42 DIZZINESS: ICD-10-CM

## 2019-10-26 DIAGNOSIS — R11.2 NAUSEA AND VOMITING, INTRACTABILITY OF VOMITING NOT SPECIFIED, UNSPECIFIED VOMITING TYPE: ICD-10-CM

## 2019-10-26 LAB
ALBUMIN SERPL-MCNC: 4 G/DL (ref 3.4–5)
ALBUMIN UR-MCNC: NEGATIVE MG/DL
ALP SERPL-CCNC: 60 U/L (ref 40–150)
ALT SERPL W P-5'-P-CCNC: 32 U/L (ref 0–50)
ANION GAP SERPL CALCULATED.3IONS-SCNC: 5 MMOL/L (ref 3–14)
APPEARANCE UR: CLEAR
AST SERPL W P-5'-P-CCNC: 22 U/L (ref 0–45)
BASOPHILS # BLD AUTO: 0 10E9/L (ref 0–0.2)
BASOPHILS NFR BLD AUTO: 0.5 %
BILIRUB DIRECT SERPL-MCNC: <0.1 MG/DL (ref 0–0.2)
BILIRUB SERPL-MCNC: 0.4 MG/DL (ref 0.2–1.3)
BILIRUB UR QL STRIP: NEGATIVE
BUN SERPL-MCNC: 12 MG/DL (ref 7–30)
CALCIUM SERPL-MCNC: 9.1 MG/DL (ref 8.5–10.1)
CHLORIDE SERPL-SCNC: 109 MMOL/L (ref 94–109)
CO2 SERPL-SCNC: 25 MMOL/L (ref 20–32)
COLOR UR AUTO: NORMAL
CREAT SERPL-MCNC: 0.59 MG/DL (ref 0.52–1.04)
CRP SERPL-MCNC: <2.9 MG/L (ref 0–8)
DIFFERENTIAL METHOD BLD: NORMAL
EOSINOPHIL # BLD AUTO: 0.1 10E9/L (ref 0–0.7)
EOSINOPHIL NFR BLD AUTO: 1.3 %
ERYTHROCYTE [DISTWIDTH] IN BLOOD BY AUTOMATED COUNT: 13.2 % (ref 10–15)
ERYTHROCYTE [SEDIMENTATION RATE] IN BLOOD BY WESTERGREN METHOD: 5 MM/H (ref 0–20)
GFR SERPL CREATININE-BSD FRML MDRD: >90 ML/MIN/{1.73_M2}
GLUCOSE SERPL-MCNC: 112 MG/DL (ref 70–99)
GLUCOSE UR STRIP-MCNC: NEGATIVE MG/DL
HCG UR QL: NEGATIVE
HCT VFR BLD AUTO: 41.1 % (ref 35–47)
HGB BLD-MCNC: 13.6 G/DL (ref 11.7–15.7)
HGB UR QL STRIP: NEGATIVE
IMM GRANULOCYTES # BLD: 0 10E9/L (ref 0–0.4)
IMM GRANULOCYTES NFR BLD: 0.2 %
INTERNAL QC OK POCT: YES
INTERPRETATION ECG - MUSE: NORMAL
KETONES UR STRIP-MCNC: NEGATIVE MG/DL
LEUKOCYTE ESTERASE UR QL STRIP: NEGATIVE
LIPASE SERPL-CCNC: 104 U/L (ref 73–393)
LYMPHOCYTES # BLD AUTO: 1.3 10E9/L (ref 0.8–5.3)
LYMPHOCYTES NFR BLD AUTO: 23 %
MCH RBC QN AUTO: 30.2 PG (ref 26.5–33)
MCHC RBC AUTO-ENTMCNC: 33.1 G/DL (ref 31.5–36.5)
MCV RBC AUTO: 91 FL (ref 78–100)
MONOCYTES # BLD AUTO: 0.6 10E9/L (ref 0–1.3)
MONOCYTES NFR BLD AUTO: 9.9 %
NEUTROPHILS # BLD AUTO: 3.6 10E9/L (ref 1.6–8.3)
NEUTROPHILS NFR BLD AUTO: 65.1 %
NITRATE UR QL: NEGATIVE
NRBC # BLD AUTO: 0 10*3/UL
NRBC BLD AUTO-RTO: 0 /100
PH UR STRIP: 7 PH (ref 5–7)
PLATELET # BLD AUTO: 326 10E9/L (ref 150–450)
POTASSIUM SERPL-SCNC: 3.7 MMOL/L (ref 3.4–5.3)
PROT SERPL-MCNC: 7.1 G/DL (ref 6.8–8.8)
RBC # BLD AUTO: 4.51 10E12/L (ref 3.8–5.2)
SODIUM SERPL-SCNC: 139 MMOL/L (ref 133–144)
SOURCE: NORMAL
SP GR UR STRIP: 1.01 (ref 1–1.03)
UROBILINOGEN UR STRIP-MCNC: NORMAL MG/DL (ref 0–2)
WBC # BLD AUTO: 5.5 10E9/L (ref 4–11)

## 2019-10-26 PROCEDURE — 83690 ASSAY OF LIPASE: CPT | Performed by: INTERNAL MEDICINE

## 2019-10-26 PROCEDURE — 99285 EMERGENCY DEPT VISIT HI MDM: CPT | Mod: 25 | Performed by: INTERNAL MEDICINE

## 2019-10-26 PROCEDURE — 81025 URINE PREGNANCY TEST: CPT | Performed by: INTERNAL MEDICINE

## 2019-10-26 PROCEDURE — 71045 X-RAY EXAM CHEST 1 VIEW: CPT

## 2019-10-26 PROCEDURE — 25000128 H RX IP 250 OP 636: Performed by: INTERNAL MEDICINE

## 2019-10-26 PROCEDURE — 86140 C-REACTIVE PROTEIN: CPT | Performed by: INTERNAL MEDICINE

## 2019-10-26 PROCEDURE — 80048 BASIC METABOLIC PNL TOTAL CA: CPT | Performed by: INTERNAL MEDICINE

## 2019-10-26 PROCEDURE — 81003 URINALYSIS AUTO W/O SCOPE: CPT | Performed by: INTERNAL MEDICINE

## 2019-10-26 PROCEDURE — 93010 ELECTROCARDIOGRAM REPORT: CPT | Mod: Z6 | Performed by: INTERNAL MEDICINE

## 2019-10-26 PROCEDURE — 85025 COMPLETE CBC W/AUTO DIFF WBC: CPT | Performed by: INTERNAL MEDICINE

## 2019-10-26 PROCEDURE — 85652 RBC SED RATE AUTOMATED: CPT | Performed by: INTERNAL MEDICINE

## 2019-10-26 PROCEDURE — 96361 HYDRATE IV INFUSION ADD-ON: CPT | Performed by: INTERNAL MEDICINE

## 2019-10-26 PROCEDURE — 93005 ELECTROCARDIOGRAM TRACING: CPT | Performed by: INTERNAL MEDICINE

## 2019-10-26 PROCEDURE — 80076 HEPATIC FUNCTION PANEL: CPT | Performed by: INTERNAL MEDICINE

## 2019-10-26 PROCEDURE — 96374 THER/PROPH/DIAG INJ IV PUSH: CPT | Performed by: INTERNAL MEDICINE

## 2019-10-26 RX ORDER — METOCLOPRAMIDE HYDROCHLORIDE 5 MG/ML
5 INJECTION INTRAMUSCULAR; INTRAVENOUS ONCE
Status: COMPLETED | OUTPATIENT
Start: 2019-10-26 | End: 2019-10-26

## 2019-10-26 RX ORDER — METOCLOPRAMIDE 5 MG/1
5 TABLET ORAL 4 TIMES DAILY PRN
Qty: 10 TABLET | Refills: 0 | Status: SHIPPED | OUTPATIENT
Start: 2019-10-26 | End: 2020-12-29

## 2019-10-26 RX ADMIN — METOCLOPRAMIDE 5 MG: 5 INJECTION, SOLUTION INTRAMUSCULAR; INTRAVENOUS at 10:21

## 2019-10-26 RX ADMIN — SODIUM CHLORIDE 1000 ML: 9 INJECTION, SOLUTION INTRAVENOUS at 10:19

## 2019-10-26 ASSESSMENT — ENCOUNTER SYMPTOMS
NAUSEA: 1
ARTHRALGIAS: 0
COLOR CHANGE: 0
NECK STIFFNESS: 0
HEADACHES: 1
CHILLS: 1
DIAPHORESIS: 1
EYE REDNESS: 0
UNEXPECTED WEIGHT CHANGE: 1
CONFUSION: 1
PALPITATIONS: 1
DIFFICULTY URINATING: 0
BACK PAIN: 1
ROS SKIN COMMENTS: POSITIVE FOR ITCHING.
ABDOMINAL PAIN: 0
VOMITING: 1
TREMORS: 1
DIZZINESS: 1
SHORTNESS OF BREATH: 0
FEVER: 1
TROUBLE SWALLOWING: 1

## 2019-10-26 NOTE — ED TRIAGE NOTES
"Patient presents via EMS for c/o N/V. Patient reports dizziness,that is worse when she wakes up in the morning, nausea, sore throat, \"shaking\", and pain in eyes. Denies blood in vomit. Patient states she was seen at [facility] and told to follow-up with endocrinology.   "

## 2019-10-26 NOTE — ED AVS SNAPSHOT
[facility], [place], Emergency Department  [address] [place] [id number]  Phone:  [phone]                                    Cristine Caballero   MRN: [id number]    Department:  [facility], Emergency Department   Date of Visit:  10/26/2019           After Visit Summary Signature Page    I have received my discharge instructions, and my questions have been answered. I have discussed any challenges I see with this plan with the nurse or doctor.    ..........................................................................................................................................  Patient/Patient Representative Signature      ..........................................................................................................................................  Patient Representative Print Name and Relationship to Patient    ..................................................               ................................................  Date                                   Time    ..........................................................................................................................................  Reviewed by Signature/Title    ...................................................              ..............................................  Date                                               Time          22EPIC Rev 08/18

## 2019-10-26 NOTE — LETTER
October 26, 2019      To Whom It May Concern:      Cristine Caballero was seen in our Emergency Department today, 10/26/19.  I expect her condition to improve over the next 1 days.  She may return to work/school when improved.    Sincerely,        Dannielle Ojeda MD, MD

## 2019-10-26 NOTE — ED NOTES
Bed: ED06  Expected date:   Expected time:   Means of arrival:   Comments:  H428 29F N/V yellow in 7 min

## 2019-10-26 NOTE — DISCHARGE INSTRUCTIONS
Please make an appointment to follow up with Endocrine clinic and Your Primary Care Provider as soon as possible even if entirely better.

## 2019-11-04 ENCOUNTER — HEALTH MAINTENANCE LETTER (OUTPATIENT)
Age: 29
End: 2019-11-04

## 2019-11-06 ENCOUNTER — TELEPHONE (OUTPATIENT)
Dept: OTHER | Facility: OUTPATIENT CENTER | Age: 29
End: 2019-11-06
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
## 2019-11-06 NOTE — TELEPHONE ENCOUNTER
.Per: BERENICE torres/ LAUREN/MEDICARE/MEDICAID  Copay$ 0   Ded$ 0; Met$ 0   Coins 0%    Out of Pocket Max$ 0 ; Met$ 0     Psych testing require auth (92447/06626)? no  Extended therapy require auth (45935)?  no    Exclusions:   Family Therapy (92065/64152)  NO    Marriage couple counseling  YES   Transgender/Gender Dysphoria no   CSB no   Sexual dysfunction no    Patient Contacted about benefits:  LEFT VOICEMAIL  Date contacted: 11/06/2019
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
## 2019-11-13 ENCOUNTER — OFFICE VISIT (OUTPATIENT)
Dept: OTHER | Facility: OUTPATIENT CENTER | Age: 29
End: 2019-11-13
Payer: COMMERCIAL

## 2019-11-13 DIAGNOSIS — F52.6 DYSPAREUNIA, PSYCHOGENIC: Primary | ICD-10-CM

## 2019-11-13 DIAGNOSIS — F60.9: ICD-10-CM

## 2019-11-13 DIAGNOSIS — F45.9 SOMATOFORM DISORDER, UNSPECIFIED: ICD-10-CM

## 2019-11-15 ASSESSMENT — PATIENT HEALTH QUESTIONNAIRE - PHQ9
SUM OF ALL RESPONSES TO PHQ QUESTIONS 1-9: 11
5. POOR APPETITE OR OVEREATING: SEVERAL DAYS

## 2019-11-15 ASSESSMENT — ANXIETY QUESTIONNAIRES
6. BECOMING EASILY ANNOYED OR IRRITABLE: MORE THAN HALF THE DAYS
GAD7 TOTAL SCORE: 8
7. FEELING AFRAID AS IF SOMETHING AWFUL MIGHT HAPPEN: MORE THAN HALF THE DAYS
5. BEING SO RESTLESS THAT IT IS HARD TO SIT STILL: NOT AT ALL
3. WORRYING TOO MUCH ABOUT DIFFERENT THINGS: SEVERAL DAYS
2. NOT BEING ABLE TO STOP OR CONTROL WORRYING: SEVERAL DAYS
1. FEELING NERVOUS, ANXIOUS, OR ON EDGE: SEVERAL DAYS

## 2019-11-16 ASSESSMENT — ANXIETY QUESTIONNAIRES: GAD7 TOTAL SCORE: 8

## 2019-11-17 ENCOUNTER — TEAM CONFERENCE (OUTPATIENT)
Dept: OTHER | Facility: OUTPATIENT CENTER | Age: 29
End: 2019-11-17
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
## 2019-11-26 ENCOUNTER — TELEPHONE (OUTPATIENT)
Dept: OTHER | Facility: OUTPATIENT CENTER | Age: 29
End: 2019-11-26

## 2019-11-26 NOTE — TELEPHONE ENCOUNTER
"Called patient when she didn't show on time for her appointment. Patient apologized and said she meant to call yesterday and cancel as she was down at the [facility]. Cancelled her next appointment next week as she thought she still might be working with the [facility]. Informed patient about our no show policy. Also, discussed her no show yesterday for Dr. Zaida MD; patient indicated she had called to cancel but there was no record of her doing so. Confirmed that patient would attend her next appointment mid-December.     Patient's medical record indicated a high no show rate of 18% (excluding cancellations and late cancellations).       Jolynn \"Silverio\" DUANE Cuellar, Ph.D., Professor  [place] Psychologist  [place] Licensed Marriage & Family Therapist & State Approved Supervisor  "

## 2019-12-16 ENCOUNTER — OFFICE VISIT (OUTPATIENT)
Dept: OTHER | Facility: OUTPATIENT CENTER | Age: 29
End: 2019-12-16
Payer: COMMERCIAL

## 2019-12-16 DIAGNOSIS — N94.10 DYSPAREUNIA IN FEMALE: ICD-10-CM

## 2019-12-16 NOTE — Clinical Note
I think her dyspareunia is part of her chronic musculoskeletal pain, not particularly related to intercourse/genitals themselves; she has chronic functional abdominal pain as well. She really was more interested in asking me about 10 other different symptoms at the same time, as much as as the sexual pain

## 2019-12-18 ENCOUNTER — OFFICE VISIT (OUTPATIENT)
Dept: OTHER | Facility: OUTPATIENT CENTER | Age: 29
End: 2019-12-18
Payer: COMMERCIAL

## 2019-12-18 DIAGNOSIS — F60.9: ICD-10-CM

## 2019-12-18 DIAGNOSIS — F45.9 SOMATOFORM DISORDER, UNSPECIFIED: ICD-10-CM

## 2019-12-18 DIAGNOSIS — F52.6 DYSPAREUNIA, PSYCHOGENIC: Primary | ICD-10-CM
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
## 2020-01-02 PROBLEM — N94.10 DYSPAREUNIA IN FEMALE: Status: ACTIVE | Noted: 2020-01-02

## 2020-01-02 NOTE — PROGRESS NOTES
This is a Sexual Medicine consultation at the request of Dr. Carlo Cuellar.      IDENTIFICATION:  A 29-year-old cisgender female.      CHIEF CONCERN:  Pain with intercourse since 07/2019.      HISTORY OF PRESENT ILLNESS:  Patient states that prior to being sexually active with partner, she was able to use tampons with some pain or discomfort mainly in the right hip flexor area.  She underwent her first pelvic exam in 2011.  She was not sexually active at the time.  She does not remember if this was painful or not.  She first became sexually active with a partner in 12/2018.  It was a consensual event and she denies having any pain, any significant discomfort with sex at that time.  She had a progestin IUD placed in late 2018 after this first partnered sexual activity and had a severe syncopal reaction which was determined to be a vasovagal response.  She then began having a long distance relationship with a different partner in 2019.  In the spring of 2019 she experienced difficulty urinating, mainly in terms of starting her stream of urine, difficulty emptying her bladder fully.  She saw her partner in July for a visit.  At the time she did not feel well.  She had flu-like symptoms but no pelvic symptoms.  During a sexual episode she was on top of her partner at the time of penetration, she felt an immediate stabbing pain and stopped sex.  The next day they tried intercourse again with the patient on the bottom, the same symptom occurred and she experienced some bleeding afterwards.  Described the pain as starting at penetration with the penis barely inside the opening, describes the pain, however, not at the opening but deep in the pelvis, deep stabbing pain more on the right versus the left and lasted just a few minutes.  This was on 07/04.  On 07/05 the same thing occurred; however, the pain was more stabbing and aching with the penis group home to full penetration.  Described her entire lower part of her body  aching and stabbing down to her toes, lasting for weeks after the episode, stating she could not walk for 3 weeks due to the pain.  She went to the emergency room.  Nothing was found.  Except for gas she was diagnosed with trochanteric bursitis and hip flexor strain.  She then went to her primary care provider, who prescribed pelvic floor physical therapy.  At this time she was away from her partner and was stepping back from this relationship and started dating someone new in 09/2019.  She attended pelvic physical therapy included dilators, exams and massage, experienced both external and internal pain.  They were mainly focused on external pain and external work.  With her new partner she has had pain but now is able to work through it.  It is not the same kind of pain that she had in July.  She is now describing lower back pain and occasional stabbing pain with certain types of entry.  She notes that she also has chronic intermittent musculoskeletal pain on the lower half of her body that occurs without sexual activity and it can be difficult to distinguish whether her pain with sex is different than her nonsexual pain.      With masturbation she usually does external masturbation over the vulva or clitoris, occasionally will use a finger internally.  She also expressed concern that she felt her cervix, which is normally not something she is capable of doing; however, at this time she is lying on her back with pillows under her hips and she felt pain and discomfort and a sense of feeling faint.  Patient has multiple other concerns regarding hormones, possible PCOS, questions about menstrual cycle, which there is insufficient time to address at this visit.      CURRENT MEDICAL PROBLEMS:  Depression, irritable bowel syndrome, pelvic floor dysfunction, back pain, chronic musculoskeletal pain, anxiety.      MEDICATIONS:  Only passion flower, valerian, raspberry and throat coat.  No prescribed medications.         ALLERGIES:  Patient has the following allergies or intolerances:  Betadex, codeine, penicillin, sulfa and fentanyl.      PAST MEDICAL HISTORY:  Remarkable for tonsillectomy, ear tubes, left wrist surgery, exploratory laparoscopy, wisdom teeth.      FAMILY HISTORY:  Notable for mother with depression, AML, obesity, blood clot, thyroid.  Father with mental health problems.  Sister with mental health problems, asthma, stomach problems, thyroid, seizure disorder.  Maternal grandfather with heart disease, mental health.  Grandmother with breast cancer.  Aunt with  problems.      SOCIAL HISTORY:  Patient eats a standard diet.  History of periodic binging from teenager and some history of overexercise, again during teenage years.  Currently limited exercise 2-3 times a week.  Alcohol use, none currently.  In the past had struggled with alcohol problems.  Nonsmoker.      SEXUAL HISTORY:  The patient has regular periods, currently lasting 6-8 days.  She had her IUD removed around 08/2018 due to prolonged menstrual bleeding, and this resolved after IUD removal.  She is not currently sexually active with a partner.  HIV negative in 08/2019.  No history of STIs.      REVIEW OF SYSTEMS:  Extensively positive in almost every major organ system.  Patient only does not endorse runny nose, sinus problems, leakage of urine, kidney stones, wheezing, seizures, stroke or paralysis, breast lumps, transfusions or blood clots or hepatitis or immune deficiency.  She endorses every other symptom currently listed on the review of systems and has in fact added other symptoms that she is experiencing.  Reviewed with patient previous medical records regarding her pelvic pain.  CT of the abdomen and pelvis in 04/2019 was essentially normal.  Ultrasound of the pelvis in 07/2019 was also normal.  We reviewed multiple labs including punch biopsy of the vestibule from 2010 which showed mild dermal chronic inflammation but otherwise unremarkable,  and reviewed the following recent labs with the patient:  17-hydroxyprogesterone normal, DHEAS elevated at 531, testosterone which was total testosterone which was 6 points above normal with a normal free testosterone, cortisol levels which were normal, TSH normal, basic metabolic panel normal, magnesium also normal.      PHYSICAL EXAMINATION:   GENERAL:  The patient is alert, in no apparent distress.   VITAL SIGNS:  No vitals were taken.     Counseled patient that she is experiencing different types of pain during sexual activity and similar pain that is not sexually related.  Her pain appears to have improved with pelvic physical therapy and she is not currently in a sexual relationship and has less concern at the moment regarding pain with intercourse.  Counseled patient that penile penetration either just within the vaginal canal or even part way or fully within the vaginal canal is anatomically highly unlikely to cause pain of the entire lower half of the body lasting for weeks afterwards or even certainly pain that is deep and stabbing in nature with only fair penetration.  It is more likely related to overall positioning of the back and hips and not related to penile penetration at all, but rather orthopedic positioning during sexual activity and consistent with her chronic musculoskeletal pain outside of sexual activity.  Certainly pelvic physical therapy would help with total pelvic floor and lower back musculoskeletal function and will help improve this pain regardless of whether it is related to penetration or not, and would certainly recommend continuing  these exercises as needed.  In final analysis believe that her sexual pain is actually more likely chronic pain that is exacerbated by sexual activity and unrelated specifically to penetrative intercourse in and of itself.  In addition, discussed the normal sensations that one can experience with any object touching the cervix and that it would not be  unusual to have the cervix be palpable given the patient felt it was in during masturbation at that visit, that a vasovagal response would not be unusual given the patient's hypersensitivity.  Talked about normalizing these experiences.  Recommend using a vibrator or toy with a handle to limit over-penetration and not touching the cervix during masturbation if needed.  Recommend working with Orthopedics to address overall orthopedic health or with Sports Medicine or other physical pain management.      Recommend maximizing her overall physical and mental health and treating her chronic pain in order to maximize her sexual function.  Discussed that given her labs as reviewed above, PCOS may be a possible diagnosis but would need to take a further history to discuss these issues.  Follow up as needed if sexual- specific symptoms persist or if she wishes to discuss hormones, PCOS or other menstrual symptoms.      Total time spent with patient was 45 minutes, greater than 50% related to counseling on the issues above.

## 2020-02-16 ENCOUNTER — HEALTH MAINTENANCE LETTER (OUTPATIENT)
Age: 30
End: 2020-02-16

## 2020-02-18 ENCOUNTER — TELEPHONE (OUTPATIENT)
Dept: OTHER | Facility: OUTPATIENT CENTER | Age: 30
End: 2020-02-18

## 2020-02-18 ENCOUNTER — TELEPHONE (OUTPATIENT)
Dept: PEDIATRICS | Facility: CLINIC | Age: 30
End: 2020-02-18

## 2020-02-18 NOTE — TELEPHONE ENCOUNTER
Called back. Requested these be emailed to her at [email or web address]. This was done.    Ramya Harris RN

## 2020-02-18 NOTE — TELEPHONE ENCOUNTER
Spoke to Cristine. Saw Dr. Mina for primary care about 10 years ago. She is requesting her immunization records before an appt tomorrow to establish a new PCP. She reached out to health records and they are not able to get these for her by tomorrow. The chart immunization record is consistent with what [facility] has as well for patient.     I LMOM notifying her of this and offered for her to call back and I can email or she can  the [facility] report.     Rmaya Harris RN

## 2020-02-18 NOTE — TELEPHONE ENCOUNTER
Reason for Call:  Other     Detailed comments:  Patient is calling and has some questions in regards to her immunizations.  Please call her.    Phone Number Patient can be reached at: Home number on file [phone] (home)    Best Time: any    Can we leave a detailed message on this number? YES    Call taken on 2/18/2020 at 11:47 AM by Hellen Stephens

## 2020-02-18 NOTE — TELEPHONE ENCOUNTER
Patient called inquiring about obtaining a copy of her intake forms for Zaida. Was told she could come in and fill out an NIESHA and we could provide her with request.

## 2020-04-14 ENCOUNTER — HOSPITAL ENCOUNTER (EMERGENCY)
Facility: CLINIC | Age: 30
Discharge: HOME | End: 2020-04-14
Attending: EMERGENCY MEDICINE | Admitting: EMERGENCY MEDICINE
Payer: MEDICARE

## 2020-04-14 ENCOUNTER — APPOINTMENT (OUTPATIENT)
Dept: CT IMAGING | Facility: CLINIC | Age: 30
End: 2020-04-14
Attending: EMERGENCY MEDICINE
Payer: MEDICARE

## 2020-04-14 ENCOUNTER — APPOINTMENT (OUTPATIENT)
Dept: GENERAL RADIOLOGY | Facility: CLINIC | Age: 30
End: 2020-04-14
Attending: EMERGENCY MEDICINE
Payer: MEDICARE

## 2020-04-14 VITALS
OXYGEN SATURATION: 98 % | WEIGHT: 157.5 LBS | BODY MASS INDEX: 25.42 KG/M2 | DIASTOLIC BLOOD PRESSURE: 87 MMHG | TEMPERATURE: 98.5 F | RESPIRATION RATE: 18 BRPM | HEART RATE: 83 BPM | SYSTOLIC BLOOD PRESSURE: 117 MMHG

## 2020-04-14 DIAGNOSIS — R33.9 URINARY RETENTION: ICD-10-CM

## 2020-04-14 DIAGNOSIS — R68.89 MULTIPLE SOMATIC COMPLAINTS: ICD-10-CM

## 2020-04-14 LAB
ALBUMIN SERPL-MCNC: 3.8 G/DL (ref 3.4–5)
ALBUMIN UR-MCNC: NEGATIVE MG/DL
ALP SERPL-CCNC: 63 U/L (ref 40–150)
ALT SERPL W P-5'-P-CCNC: 19 U/L (ref 0–50)
ANION GAP SERPL CALCULATED.3IONS-SCNC: 5 MMOL/L (ref 3–14)
APPEARANCE UR: ABNORMAL
AST SERPL W P-5'-P-CCNC: 9 U/L (ref 0–45)
BACTERIA #/AREA URNS HPF: ABNORMAL /HPF
BASOPHILS # BLD AUTO: 0 10E9/L (ref 0–0.2)
BASOPHILS NFR BLD AUTO: 0.6 %
BILIRUB SERPL-MCNC: 0.2 MG/DL (ref 0.2–1.3)
BILIRUB UR QL STRIP: NEGATIVE
BUN SERPL-MCNC: 12 MG/DL (ref 7–30)
CALCIUM SERPL-MCNC: 8.9 MG/DL (ref 8.5–10.1)
CHLORIDE SERPL-SCNC: 107 MMOL/L (ref 94–109)
CO2 SERPL-SCNC: 29 MMOL/L (ref 20–32)
COLOR UR AUTO: ABNORMAL
CREAT SERPL-MCNC: 0.59 MG/DL (ref 0.52–1.04)
DIFFERENTIAL METHOD BLD: NORMAL
EOSINOPHIL # BLD AUTO: 0.1 10E9/L (ref 0–0.7)
EOSINOPHIL NFR BLD AUTO: 1.2 %
ERYTHROCYTE [DISTWIDTH] IN BLOOD BY AUTOMATED COUNT: 12.9 % (ref 10–15)
GFR SERPL CREATININE-BSD FRML MDRD: >90 ML/MIN/{1.73_M2}
GLUCOSE SERPL-MCNC: 82 MG/DL (ref 70–99)
GLUCOSE UR STRIP-MCNC: NEGATIVE MG/DL
HCG UR QL: NEGATIVE
HCT VFR BLD AUTO: 38.9 % (ref 35–47)
HGB BLD-MCNC: 13 G/DL (ref 11.7–15.7)
HGB UR QL STRIP: NEGATIVE
IMM GRANULOCYTES # BLD: 0 10E9/L (ref 0–0.4)
IMM GRANULOCYTES NFR BLD: 0 %
INTERNAL QC OK POCT: YES
KETONES UR STRIP-MCNC: NEGATIVE MG/DL
LEUKOCYTE ESTERASE UR QL STRIP: ABNORMAL
LIPASE SERPL-CCNC: 106 U/L (ref 73–393)
LYMPHOCYTES # BLD AUTO: 1.4 10E9/L (ref 0.8–5.3)
LYMPHOCYTES NFR BLD AUTO: 28.2 %
MCH RBC QN AUTO: 30.4 PG (ref 26.5–33)
MCHC RBC AUTO-ENTMCNC: 33.4 G/DL (ref 31.5–36.5)
MCV RBC AUTO: 91 FL (ref 78–100)
MONOCYTES # BLD AUTO: 0.6 10E9/L (ref 0–1.3)
MONOCYTES NFR BLD AUTO: 11.8 %
NEUTROPHILS # BLD AUTO: 3 10E9/L (ref 1.6–8.3)
NEUTROPHILS NFR BLD AUTO: 58.2 %
NITRATE UR QL: NEGATIVE
NRBC # BLD AUTO: 0 10*3/UL
NRBC BLD AUTO-RTO: 0 /100
PH UR STRIP: 8 PH (ref 5–7)
PLATELET # BLD AUTO: 359 10E9/L (ref 150–450)
POTASSIUM SERPL-SCNC: 4.2 MMOL/L (ref 3.4–5.3)
PROT SERPL-MCNC: 6.8 G/DL (ref 6.8–8.8)
RBC # BLD AUTO: 4.27 10E12/L (ref 3.8–5.2)
RBC #/AREA URNS AUTO: <1 /HPF (ref 0–2)
SODIUM SERPL-SCNC: 141 MMOL/L (ref 133–144)
SOURCE: ABNORMAL
SP GR UR STRIP: 1.01 (ref 1–1.03)
SQUAMOUS #/AREA URNS AUTO: 20 /HPF (ref 0–1)
UROBILINOGEN UR STRIP-MCNC: NORMAL MG/DL (ref 0–2)
WBC # BLD AUTO: 5.1 10E9/L (ref 4–11)
WBC #/AREA URNS AUTO: 10 /HPF (ref 0–5)

## 2020-04-14 PROCEDURE — 81025 URINE PREGNANCY TEST: CPT | Performed by: EMERGENCY MEDICINE

## 2020-04-14 PROCEDURE — 99285 EMERGENCY DEPT VISIT HI MDM: CPT | Mod: 25 | Performed by: EMERGENCY MEDICINE

## 2020-04-14 PROCEDURE — 70450 CT HEAD/BRAIN W/O DYE: CPT

## 2020-04-14 PROCEDURE — 85025 COMPLETE CBC W/AUTO DIFF WBC: CPT | Performed by: EMERGENCY MEDICINE

## 2020-04-14 PROCEDURE — 93010 ELECTROCARDIOGRAM REPORT: CPT | Mod: Z6 | Performed by: EMERGENCY MEDICINE

## 2020-04-14 PROCEDURE — 80053 COMPREHEN METABOLIC PANEL: CPT | Performed by: EMERGENCY MEDICINE

## 2020-04-14 PROCEDURE — 83690 ASSAY OF LIPASE: CPT | Performed by: EMERGENCY MEDICINE

## 2020-04-14 PROCEDURE — 71045 X-RAY EXAM CHEST 1 VIEW: CPT

## 2020-04-14 PROCEDURE — 81001 URINALYSIS AUTO W/SCOPE: CPT | Performed by: EMERGENCY MEDICINE

## 2020-04-14 PROCEDURE — 93005 ELECTROCARDIOGRAM TRACING: CPT | Performed by: EMERGENCY MEDICINE

## 2020-04-14 PROCEDURE — 51798 US URINE CAPACITY MEASURE: CPT | Performed by: EMERGENCY MEDICINE

## 2020-04-14 ASSESSMENT — ENCOUNTER SYMPTOMS
CONFUSION: 1
TROUBLE SWALLOWING: 1
ABDOMINAL PAIN: 1
NAUSEA: 1
SHORTNESS OF BREATH: 1
CHILLS: 1
DIAPHORESIS: 1
CONSTIPATION: 1
DYSURIA: 1
COUGH: 1
SORE THROAT: 1
TREMORS: 1
VOMITING: 0
UNEXPECTED WEIGHT CHANGE: 1
FEVER: 0
DIFFICULTY URINATING: 1
FATIGUE: 1

## 2020-04-14 NOTE — ED NOTES
04/14/20 1836   Urine Assessment   Urine Characteristics clear   Bladder Scan Volume (mL) 173 ml  (173 cc)   Urine: Other Bladder Scan

## 2020-04-14 NOTE — ED AVS SNAPSHOT
[facility], [place], Emergency Department  [address]  [place] [address]  Phone:  [phone]  Fax:  [phone]                                    Cristine Caballero   MRN: [id number]    Department:  [facility], Emergency Department   Date of Visit:  4/14/2020           After Visit Summary Signature Page    I have received my discharge instructions, and my questions have been answered. I have discussed any challenges I see with this plan with the nurse or doctor.    ..........................................................................................................................................  Patient/Patient Representative Signature      ..........................................................................................................................................  Patient Representative Print Name and Relationship to Patient    ..................................................               ................................................  Date                                   Time    ..........................................................................................................................................  Reviewed by Signature/Title    ...................................................              ..............................................  Date                                               Time          22EPIC Rev 08/18

## 2020-04-14 NOTE — ED PROVIDER NOTES
[facility] EMERGENCY DEPARTMENT ([facility])    4/14/20        History     Chief Complaint   Patient presents with     Shortness of Breath     Pt reported SOB with pain below lt rib cage for past 2 days     The history is provided by the patient and medical records.     Cristine Caballero is a 29 year old female with a past medical history significant for bipolar disorder, borderline personality disorder, DENAE, MDD, OCD, IBS and eating disorder who presents here to the Emergency Department with a multitude of complaints. Patient reports starting last Thursday/Friday (4/9-4/10) she began getting shortness of breath and chest pain after standing. Reports that it is difficult for her to take a deep breath due to this pain. Since that time she has also noted increased tremors, fatigue and confusion after eating (whether she left the oven on or where she put things). Patient has also noted to be waking up in the middle of the night to sweats, chills and shortness of breath. States that these are not associated with nightmares. She is noticing that the right side of her body appears to be hot and cold. States that she has not checked her temperature at home because she has been unable to find a thermometer. Patient also reports rhinorrhea after eating and drinking, right sided abdominal pain (chronic per chart review0, nausea, and left lower rib pain. Reports that since onset of her symptoms they have been worsening.  She also notes that when she eats she immediately becomes altered and confused.  Claims she cannot tolerate water as this gives her a headache.  Notes that on Thursday (4/9) patient had a sore throat and hemoptysis that has since resolved. She also reports some dysuria on Thursday, and since reports increased urgency with troubles voiding. Denies new rashes or vomiting. Notes that over the past 2-3 weeks she has began feeling constipated and used generic Miralax last night. Reports that over the past  2-3 hours she has been unable to swallow. Notes that she called her PCP who advised her to come to the ED. Patients LMP was 1 week ago and denies chance of pregnancy. Reports she has lost 40 pounds over the past 1 year and is following up with her clinic for this. Has been eating and drinking. She has not recent hit her head. Has a history of exploratory laparoscopy. Patients friend drove her here to the ED because she reports that she can barely walk. Patient had her compazine dosage increased yesterday as well as being started on hydroxyzine.    I have reviewed the Medications, Allergies, Past Medical and Surgical History, and Social History in the [employer] system.  PAST MEDICAL HISTORY:   Past Medical History:   Diagnosis Date     Anxiety disorder      Depressive disorder      Environmental allergies      Gastroesophageal reflux disease      Hx of eating disorder      Hypertension     elevated since propanolol discontinued     IBS (irritable bowel syndrome)      OCD (obsessive compulsive disorder)     stable with CBT      Small intestinal bacterial overgrowth 2018    pt reported.      Suicide attempt by multiple drug overdose 4.21.2016     Vitamin D deficiency        PAST SURGICAL HISTORY:   Past Surgical History:   Procedure Laterality Date     ABDOMEN SURGERY  01/2012    Exploratory Laparoscopy     biopsy  8-    vulvar     BIOPSY      GI, vulva- OK     COLONOSCOPY  2011     COLONOSCOPY N/A 5/15/2019    Procedure: Colonoscopy, With Polypectomy And Biopsy;  Surgeon: Perico Meek MD;  Location:  OR     COLONOSCOPY WITH CO2 INSUFFLATION N/A 5/15/2019    Procedure: COLONOSCOPY, WITH CO2 INSUFFLATION;  Surgeon: Perico Meek MD;  Location:  OR     COMBINED ESOPHAGOSCOPY, GASTROSCOPY, DUODENOSCOPY (EGD) WITH CO2 INSUFFLATION N/A 5/15/2019    Procedure: ESOPHAGOGASTRODUODENOSCOPY, WITH CO2 INSUFFLATION;  Surgeon: Perico Meek MD;  Location:  OR     DENTAL SURGERY  2011     ENT SURGERY  2014     intubation & NG tube     ESOPHAGOSCOPY, GASTROSCOPY, DUODENOSCOPY (EGD), COMBINED  10/11/2011    Procedure:COMBINED ESOPHAGOSCOPY, GASTROSCOPY, DUODENOSCOPY (EGD), BIOPSY SINGLE OR MULTIPLE; Surgeon:TALA MONTEMAYOR; Location:[facility]     ESOPHAGOSCOPY, GASTROSCOPY, DUODENOSCOPY (EGD), COMBINED N/A 5/15/2019    Procedure: Esophagogastroduodenoscopy, With Biopsy;  Surgeon: Perico Meek MD;  Location: [facility] OR     HC BREATH HYDROGEN TEST  10/4/2011    Procedure:HYDROGEN BREATH TEST; Surgeon:RADHA EPSTEIN; Location:[facility]     LAPAROSCOPY       ORTHOPEDIC SURGERY      left wrist- screw in left scaphiod     TONSILLECTOMY       WRIST SURGERY  2004    screw placed L wrist after fracture       Past medical history, past surgical history, medications, and allergies were reviewed with the patient. Additional pertinent items: None    FAMILY HISTORY:   Family History   Problem Relation Age of Onset     Depression Mother      Allergies Mother      Asthma Mother      Thyroid Disease Mother      Eye Disorder Mother      Blood Disease Mother          essential thrombocytosis     Cancer Mother         AML, recent BMT     Anxiety Disorder Mother      Mental Illness Mother         Hoarding- OCD     Depression Sister      Anxiety Disorder Sister      Mental Illness Sister         OCD_ HOarding     Genitourinary Problems Sister      Asthma Sister      Eye Disorder Sister      Allergies Sister      Allergies Sister      Mental Illness Sister         ADHD     Depression Maternal Grandmother      Heart Disease Maternal Grandmother      Thyroid Disease Maternal Grandmother      Depression Other         aunt     Anxiety Disorder Other      Genitourinary Problems Other         aunt     Suicide Other      Heart Disease Maternal Grandfather      C.A.D. Maternal Grandfather      C.A.D. Paternal Grandfather      Cancer Paternal Grandfather      Eye Disorder Father      Mental Illness Father         OCPD?     Allergies Other         uncles        SOCIAL HISTORY:   Social History     Tobacco Use     Smoking status: Never Smoker     Smokeless tobacco: Never Used   Substance Use Topics     Alcohol use: Yes     Alcohol/week: 3.0 - 6.0 standard drinks     Types: 1 - 2 Glasses of wine, 1 - 2 Cans of beer, 1 - 2 Shots of liquor per week     Frequency: Monthly or less     Drinks per session: 1 or 2     Binge frequency: Monthly     Comment: 1-2 EtOH drinks 2 days per week     Social history was reviewed with the patient. Additional pertinent items: None             Allergies   Allergen Reactions     Betadex Nausea and Vomiting     Codeine Other (See Comments)     Other reaction(s): Tremors     Penicillin G      Sulfa Drugs      Fentanyl Rash     Shauna oral rash after fentanyl during egd in 2011        Review of Systems   Constitutional: Positive for chills, diaphoresis, fatigue and unexpected weight change. Negative for fever.   HENT: Positive for sore throat (Resolved) and trouble swallowing.    Respiratory: Positive for cough (Resolved) and shortness of breath (While standing).         Positive for hemoptysis (resolved)   Cardiovascular: Positive for chest pain (While standing).   Gastrointestinal: Positive for abdominal pain (Right sided), constipation and nausea. Negative for vomiting.   Genitourinary: Positive for difficulty urinating, dysuria and urgency.   Musculoskeletal:        Positive for left lower rib pain   Skin: Negative for rash.   Neurological: Positive for tremors.   Psychiatric/Behavioral: Positive for confusion.   All other systems reviewed and are negative.    Physical Exam   BP: 117/87  Pulse: 83  Temp: 99.2  F (37.3  C)  Resp: 18  Weight: 71.4 kg (157 lb 8 oz)  SpO2: 99 %      Physical Exam  Vitals signs and nursing note reviewed.   Constitutional:       General: She is not in acute distress.     Appearance: She is well-developed. She is not diaphoretic.      Comments: Adult female, incredibly somatically preoccupied, alert, cooperative,  NAD   HENT:      Head: Normocephalic and atraumatic.      Mouth/Throat:      Comments: Normal posterior oropharynx.  No pooling of secretions.  No sign of erythema. No exudate. Patient was noted to swallow spontaneously several times during visit.   Eyes:      Conjunctiva/sclera: Conjunctivae normal.      Pupils: Pupils are equal, round, and reactive to light.   Neck:      Musculoskeletal: Normal range of motion.   Cardiovascular:      Rate and Rhythm: Normal rate and regular rhythm.      Heart sounds: Normal heart sounds.   Pulmonary:      Effort: Pulmonary effort is normal. No respiratory distress.      Breath sounds: Normal breath sounds. No wheezing or rales.   Abdominal:      General: Bowel sounds are normal. There is no distension.      Palpations: Abdomen is soft.      Tenderness: There is no abdominal tenderness.   Skin:     General: Skin is warm and dry.   Neurological:      Mental Status: She is alert and oriented to person, place, and time.      GCS: GCS eye subscore is 4. GCS verbal subscore is 5. GCS motor subscore is 6.      Cranial Nerves: Cranial nerves are intact.      Sensory: Sensation is intact.      Motor: Motor function is intact.      Coordination: Coordination is intact.      Comments: Able to sit up from lying without assistance.  Neuro exam WNL   Psychiatric:      Comments: Very poor insight.  Hyperfocused on somatic complaints, convinced she cannot swallow despite evidence to the contrary.  Frequently interrupts.           ED Course   5:57 PM  The patient was seen and examined by Idania Byrne MD in Room ED04.        Procedures             EKG Interpretation:      Interpreted by Idania Byrne MD  Time reviewed: 1745  Symptoms at time of EKG: chest pain   Rhythm: normal sinus   Rate: normal  Axis: normal  Ectopy: none  Conduction: normal  ST Segments/ T Waves: No ST-T wave changes  Q Waves: none  Comparison to prior: Unchanged    Clinical Impression: normal EKG                 Results for orders placed or performed during the hospital encounter of 04/14/20 (from the past 24 hour(s))   EKG 12 lead   Result Value Ref Range    Interpretation ECG Click View Image link to view waveform and result    CBC with platelets differential   Result Value Ref Range    WBC 5.1 4.0 - 11.0 10e9/L    RBC Count 4.27 3.8 - 5.2 10e12/L    Hemoglobin 13.0 11.7 - 15.7 g/dL    Hematocrit 38.9 35.0 - 47.0 %    MCV 91 78 - 100 fl    MCH 30.4 26.5 - 33.0 pg    MCHC 33.4 31.5 - 36.5 g/dL    RDW 12.9 10.0 - 15.0 %    Platelet Count 359 150 - 450 10e9/L    Diff Method Automated Method     % Neutrophils 58.2 %    % Lymphocytes 28.2 %    % Monocytes 11.8 %    % Eosinophils 1.2 %    % Basophils 0.6 %    % Immature Granulocytes 0.0 %    Nucleated RBCs 0 0 /100    Absolute Neutrophil 3.0 1.6 - 8.3 10e9/L    Absolute Lymphocytes 1.4 0.8 - 5.3 10e9/L    Absolute Monocytes 0.6 0.0 - 1.3 10e9/L    Absolute Eosinophils 0.1 0.0 - 0.7 10e9/L    Absolute Basophils 0.0 0.0 - 0.2 10e9/L    Abs Immature Granulocytes 0.0 0 - 0.4 10e9/L    Absolute Nucleated RBC 0.0    Lipase   Result Value Ref Range    Lipase 106 73 - 393 U/L   Comprehensive metabolic panel   Result Value Ref Range    Sodium 141 133 - 144 mmol/L    Potassium 4.2 3.4 - 5.3 mmol/L    Chloride 107 94 - 109 mmol/L    Carbon Dioxide 29 20 - 32 mmol/L    Anion Gap 5 3 - 14 mmol/L    Glucose 82 70 - 99 mg/dL    Urea Nitrogen 12 7 - 30 mg/dL    Creatinine 0.59 0.52 - 1.04 mg/dL    GFR Estimate >90 >60 mL/min/[1.73_m2]    GFR Estimate If Black >90 >60 mL/min/[1.73_m2]    Calcium 8.9 8.5 - 10.1 mg/dL    Bilirubin Total 0.2 0.2 - 1.3 mg/dL    Albumin 3.8 3.4 - 5.0 g/dL    Protein Total 6.8 6.8 - 8.8 g/dL    Alkaline Phosphatase 63 40 - 150 U/L    ALT 19 0 - 50 U/L    AST 9 0 - 45 U/L   UA with Microscopic reflex to Culture    Specimen: Urine clean catch; Midstream Urine   Result Value Ref Range    Color Urine Straw     Appearance Urine Slightly Cloudy     Glucose Urine  Negative NEG^Negative mg/dL    Bilirubin Urine Negative NEG^Negative    Ketones Urine Negative NEG^Negative mg/dL    Specific Gravity Urine 1.007 1.003 - 1.035    Blood Urine Negative NEG^Negative    pH Urine 8.0 (H) 5.0 - 7.0 pH    Protein Albumin Urine Negative NEG^Negative mg/dL    Urobilinogen mg/dL Normal 0.0 - 2.0 mg/dL    Nitrite Urine Negative NEG^Negative    Leukocyte Esterase Urine Small (A) NEG^Negative    Source Midstream Urine     WBC Urine 10 (H) 0 - 5 /HPF    RBC Urine <1 0 - 2 /HPF    Bacteria Urine Many (A) NEG^Negative /HPF    Squamous Epithelial /HPF Urine 20 (H) 0 - 1 /HPF   hCG qual urine POCT   Result Value Ref Range    HCG Qual Urine Negative neg    Internal QC OK Yes    XR Chest Port 1 View    Narrative    CHEST PORTable ONE VIEW    4/14/2020 7:51 PM     HISTORY: Chest pain.    COMPARISON: 10/26/2019.      Impression    IMPRESSION: Cardiomediastinal silhouette is normal. Lungs are clear.  No pleural fluid. Pulmonary vasculature is normal. Negative study.     VERONICA MELO MD   Head CT w/o contrast    Narrative    CT SCAN OF THE HEAD WITHOUT CONTRAST   4/14/2020 8:08 PM     HISTORY: Reported difficulty swallowing, evaluate for acute  abnormality.    TECHNIQUE:  Axial images of the head and coronal reformations without  IV contrast material. Radiation dose for this scan was reduced using  automated exposure control, adjustment of the mA and/or kV according  to patient size, or iterative reconstruction technique.    COMPARISON: None.    FINDINGS:  The cerebral hemispheres, brainstem, and cerebellum  demonstrate normal morphology and attenuation. No evidence of acute  ischemia, hemorrhage, mass, mass effect or hydrocephalus. The  visualized calvarium, tympanic cavities, mastoid cavities, and  paranasal sinuses are unremarkable.      Impression    IMPRESSION:  No acute intracranial abnormality.    JAE CHAPPELL MD     Medications - No data to display          Assessments & Plan (with Medical  Decision Making)   Patient presents to the emergency department today for multiple somatic complaints as detailed above.  Record review shows that she has had multiple ER and clinic visits in the past few months largely for similar symptoms.  In fact, it appears that some of the symptoms that she reported as acute today (such as sudden difficulty swallowing) actually did occur and she has reported this to her clinic months ago.  It is not clear to me that all of her conglomeration of symptoms has an overall unifying diagnosis.  I do have strong concerns about the effect that mental health may be playing on her multiple physical complaints, this is also been noted by previous providers.      Vital signs are within normal limits, temperature here in the ED is 99.2.  Though she reports she has severe weakness and is too weak to eat, she was able to ambulate from triage back to the exam room.  She was able to sit up on her own so that I was able to auscultate her posterior thorax.  I do not see any objective evidence of weakness.  Specifically, she is not having any pooling of secretions and several times during my evaluation she did spontaneously swallow despite her reports that she is unable to do so.    I did tell the patient we would do a medical work-up to evaluate for acute complaints today.  EKG was done which demonstrates normal sinus rhythm with a rate of 82, I do not see any sign of ectopy or ischemia.  CBC WNL, CMP WNL, lipase WNL.  UPT negative, UA appears contaminated with 20 squamous cells, 10 WBC with small LE.  CXR clear, head CT WNL.    At this point, I do believe that there is a component of urinary retention given the fact that she was able to void about 600 cc.  Repeat bladder scan shows approximately 150 cc postvoid residual.  Record review shows that her clinic increased her Compazine yesterday and she is taking Compazine up to 4 times daily as well as hydroxyzine at night.  It is possible that  Compazine is causing increased anticholinergic symptoms and urinary retention.  I have advised her to discontinue her Compazine.    The patient continues to believe that she is unable to swallow.  She reports that every time she has any oral intake she becomes altered and confused.  This seems unlikely.  The patient expresses severe frustration with regard to her ability to access care in the clinics.  Certainly now with the coronavirus pandemic, it is more difficult to get in to see a clinic personally though virtual visits are the norm.  However, it is still possible to get in for evaluation for urgent complaints or even for referrals to specialist if needed.  Given the fact that her symptoms have been intermittent for months I do not believe that there is an acute problem today.      I did speak to the patient about her symptoms and she immediately got on the phone with her friend/advocate so she could hear everything that we were saying.  The patient became very frustrated and frequently was interrupting during the conversation.  She feels that she is too weak and is too unsafe for discharge, says she has no food or water at her home and lives alone.  However, she was able to speak to a her friend who said they would be able to bring her to a grocery store.  I do not agree with her assessment that she is unsafe for discharge.  I see no evidence on clinical or laboratory evaluation of unusual weakness, dehydration or severe malnutrition.  I have very strong concerns about an overlying psychiatric component.  I do see that her clinic has repeatedly referred her for counseling.  I recommend that she follow-up.    I have reviewed the nursing notes.    I have reviewed the findings, diagnosis, plan and need for follow up with the patient.    Discharge Medication List as of 4/14/2020  9:06 PM          Final diagnoses:   Urinary retention   Multiple somatic complaints     I, Jeffy Mendoza, am serving as a trained medical  scribe to document services personally performed by Idania Byrne MD, based on the provider's statements to me.   I,  Idania Byrne MD, was physically present and have reviewed and verified the accuracy of this note documented by Jeffy Mendoza.    4/14/2020   [facility], [place], EMERGENCY DEPARTMENT     Idania Byrne MD  04/14/20 [id number]

## 2020-04-15 LAB — INTERPRETATION ECG - MUSE: NORMAL

## 2020-04-15 NOTE — ED TRIAGE NOTES
Pt complained of inability to swallow, SOB and pain below Lt rib cage for past 2 days. Pt was able to speak full sentences and does not appear to be in any respiratory distress, swallowing saliva with no drooling or notable swallowing difficulty. Pt said pain below lt rib cage is constant but stops at time when she reposition.

## 2020-04-15 NOTE — DISCHARGE INSTRUCTIONS
You have been seen in the ER today for urinary retention.  This is where the bladder does not empty completely.  This is likely due to your increased dose of compazine. We advise that you reduce or even completely discontinue your compazine to help with urinary retention issues.      As far as the other symptoms you are reporting, many of these are chronic and ongoing.  We have done an evaluation today for acute problems.     Your blood work, EKG, chest X ray and head CT are all normal.  We see no sign of heart problems or issues with the kidneys or liver or pancreas.  There is no sign of malnutrition or dehydration.      We strongly advise you to follow up with your established providers.      If you are still feeling like you cannot empty your bladder when you stop the compazine, and would like a catheter to empty your bladder, go to your clinic or come to the ER.

## 2020-04-15 NOTE — ED NOTES
04/14/20 1920   Urine Assessment   Urine Characteristics clear   Bladder Scan Volume (mL) 193 ml  (Pt voided 600cc clear urine, no foul odor with PVR of 193)   Pt said she had to press on her bladder before she voided

## 2020-05-12 ENCOUNTER — TRANSCRIBE ORDERS (OUTPATIENT)
Dept: OTHER | Age: 30
End: 2020-05-12

## 2020-05-12 DIAGNOSIS — R00.2 PALPITATIONS: Primary | ICD-10-CM

## 2020-05-12 DIAGNOSIS — R00.0 RACING HEART BEAT: ICD-10-CM

## 2020-05-16 ENCOUNTER — HOSPITAL ENCOUNTER (EMERGENCY)
Facility: CLINIC | Age: 30
Discharge: HOME | End: 2020-05-16
Attending: FAMILY MEDICINE | Admitting: FAMILY MEDICINE
Payer: MEDICARE

## 2020-05-16 VITALS
SYSTOLIC BLOOD PRESSURE: 136 MMHG | RESPIRATION RATE: 16 BRPM | TEMPERATURE: 98.4 F | DIASTOLIC BLOOD PRESSURE: 84 MMHG | OXYGEN SATURATION: 100 % | HEART RATE: 85 BPM

## 2020-05-16 DIAGNOSIS — S05.01XA ABRASION OF RIGHT CORNEA, INITIAL ENCOUNTER: ICD-10-CM

## 2020-05-16 PROCEDURE — 99283 EMERGENCY DEPT VISIT LOW MDM: CPT | Performed by: FAMILY MEDICINE

## 2020-05-16 PROCEDURE — 99283 EMERGENCY DEPT VISIT LOW MDM: CPT | Mod: Z6 | Performed by: FAMILY MEDICINE

## 2020-05-16 RX ORDER — POLYVINYL ALCOHOL 14 MG/ML
1 SOLUTION/ DROPS OPHTHALMIC PRN
Qty: 15 ML | Refills: 0 | Status: SHIPPED | OUTPATIENT
Start: 2020-05-16 | End: 2020-12-29

## 2020-05-16 RX ORDER — TOBRAMYCIN 3 MG/ML
1-2 SOLUTION/ DROPS OPHTHALMIC EVERY 4 HOURS
Qty: 1 BOTTLE | Refills: 0 | Status: SHIPPED | OUTPATIENT
Start: 2020-05-16 | End: 2020-05-19

## 2020-05-16 ASSESSMENT — ENCOUNTER SYMPTOMS
NERVOUS/ANXIOUS: 1
EYE DISCHARGE: 0
EYE PAIN: 1
EYE ITCHING: 0
VOMITING: 0
BRUISES/BLEEDS EASILY: 0
NAUSEA: 0
APPETITE CHANGE: 0
EYE REDNESS: 0
NUMBNESS: 0
PHOTOPHOBIA: 1
COUGH: 0
SHORTNESS OF BREATH: 0
ABDOMINAL DISTENTION: 1
WOUND: 1
WEAKNESS: 0
ACTIVITY CHANGE: 0
CONFUSION: 0

## 2020-05-16 NOTE — ED TRIAGE NOTES
Presented with right eye pain. Reports wiping her face yesterday and accidentally scratching the right eye with grocery paper bag. Reports pain and increased drainage from the eye.

## 2020-05-16 NOTE — ED PROVIDER NOTES
[facility] EMERGENCY DEPARTMENT ([facility])  May 16, 2020  History     Chief Complaint   Patient presents with     Eye Injury     The history is provided by medical records and the patient.     Cristine Caballero is a 30 year old female with a medical history significant for DENAE, major depression, suicide attempt, IBS, HTN, and OCD who presents to the ED today complaining of an eye injury.  Patient reports she was carrying grocery bag when it slid up her nose and cut her right eye.  Patient reports she has no visual disturbances other than her baseline floaters, however the eye pain is exacerbated by looking upwards.  Patient is also reporting trouble eating and drinking along with abdominal distention.  Patient notes a lot of ongoing medical issues that she deals with her physician for.  Patient states she has problems with her medications even when in her eye and also eye exams tend to create neurocognitive type deficits.  Patient does not wear contact lenses etc.      I have reviewed the Medications, Allergies, Past Medical and Surgical History, and Social History in the [employer] system.  PAST MEDICAL HISTORY:   Past Medical History:   Diagnosis Date     Anxiety disorder      Depressive disorder      Environmental allergies      Gastroesophageal reflux disease      Hx of eating disorder      Hypertension     elevated since propanolol discontinued     IBS (irritable bowel syndrome)      OCD (obsessive compulsive disorder)     stable with CBT      Small intestinal bacterial overgrowth 2018    pt reported.      Suicide attempt by multiple drug overdose 4.21.2016     Vitamin D deficiency        PAST SURGICAL HISTORY:   Past Surgical History:   Procedure Laterality Date     ABDOMEN SURGERY  01/2012    Exploratory Laparoscopy     biopsy  8-    vulvar     BIOPSY      GI, vulva- OK     COLONOSCOPY  2011     COLONOSCOPY N/A 5/15/2019    Procedure: Colonoscopy, With Polypectomy And Biopsy;  Surgeon: Fantasma  Perico RANDHAWA MD;  Location: [facility] OR     COLONOSCOPY WITH CO2 INSUFFLATION N/A 5/15/2019    Procedure: COLONOSCOPY, WITH CO2 INSUFFLATION;  Surgeon: Perico Meek MD;  Location: [facility] OR     COMBINED ESOPHAGOSCOPY, GASTROSCOPY, DUODENOSCOPY (EGD) WITH CO2 INSUFFLATION N/A 5/15/2019    Procedure: ESOPHAGOGASTRODUODENOSCOPY, WITH CO2 INSUFFLATION;  Surgeon: Perico Meek MD;  Location: [facility] OR     DENTAL SURGERY  2011     ENT SURGERY  2014    intubation & NG tube     ESOPHAGOSCOPY, GASTROSCOPY, DUODENOSCOPY (EGD), COMBINED  10/11/2011    Procedure:COMBINED ESOPHAGOSCOPY, GASTROSCOPY, DUODENOSCOPY (EGD), BIOPSY SINGLE OR MULTIPLE; Surgeon:TALA MONTEMAYOR; Location:[facility]     ESOPHAGOSCOPY, GASTROSCOPY, DUODENOSCOPY (EGD), COMBINED N/A 5/15/2019    Procedure: Esophagogastroduodenoscopy, With Biopsy;  Surgeon: Perico Meek MD;  Location: [facility] OR     HC BREATH HYDROGEN TEST  10/4/2011    Procedure:HYDROGEN BREATH TEST; Surgeon:RADHA EPSTEIN; Location:[facility]     LAPAROSCOPY       ORTHOPEDIC SURGERY      left wrist- screw in left scaphiod     TONSILLECTOMY       WRIST SURGERY  2004    screw placed L wrist after fracture       Past medical history, past surgical history, medications, and allergies were reviewed with the patient. Additional pertinent items: None    FAMILY HISTORY:   Family History   Problem Relation Age of Onset     Depression Mother      Allergies Mother      Asthma Mother      Thyroid Disease Mother      Eye Disorder Mother      Blood Disease Mother          essential thrombocytosis     Cancer Mother         AML, recent BMT     Anxiety Disorder Mother      Mental Illness Mother         Hoarding- OCD     Depression Sister      Anxiety Disorder Sister      Mental Illness Sister         OCD_ HOarding     Genitourinary Problems Sister      Asthma Sister      Eye Disorder Sister      Allergies Sister      Allergies Sister      Mental Illness Sister         ADHD     Depression Maternal Grandmother      Heart Disease  Maternal Grandmother      Thyroid Disease Maternal Grandmother      Depression Other         aunt     Anxiety Disorder Other      Genitourinary Problems Other         aunt     Suicide Other      Heart Disease Maternal Grandfather      KELIABERTO. Maternal Grandfather      SHANNON. Paternal Grandfather      Cancer Paternal Grandfather      Eye Disorder Father      Mental Illness Father         OCPD?     Allergies Other         uncles       SOCIAL HISTORY:   Social History     Tobacco Use     Smoking status: Never Smoker     Smokeless tobacco: Never Used   Substance Use Topics     Alcohol use: Yes     Alcohol/week: 3.0 - 6.0 standard drinks     Types: 1 - 2 Glasses of wine, 1 - 2 Cans of beer, 1 - 2 Shots of liquor per week     Frequency: Monthly or less     Drinks per session: 1 or 2     Binge frequency: Monthly     Comment: 1-2 EtOH drinks 2 days per week     Social history was reviewed with the patient. Additional pertinent items: None      Discharge Medication List as of 5/16/2020  9:50 AM      START taking these medications    Details   polyvinyl alcohol (LIQUIFILM TEARS) 1.4 % ophthalmic solution Place 1 drop into the right eye as needed for dry eyes or other (irritation), Disp-15 mL,R-0, Local Print      tobramycin (TOBREX) 0.3 % ophthalmic solution Place 1-2 drops into the right eye every 4 hours for 3 days, Disp-1 Bottle,R-0, Local Print         CONTINUE these medications which have NOT CHANGED    Details   metoclopramide (REGLAN) 5 MG tablet Take 1 tablet (5 mg) by mouth 4 times daily as needed (nausea vomiting), Disp-10 tablet, R-0, Local Print                Allergies   Allergen Reactions     Betadex Nausea and Vomiting     Codeine Other (See Comments)     Other reaction(s): Tremors     Lavender Oil Nausea     Penicillin G      Sulfa Drugs      Fentanyl Rash     Shauna oral rash after fentanyl during egd in 2011        Review of Systems   Constitutional: Negative for activity change and appetite change.   HENT:  Negative for hearing loss.    Eyes: Positive for photophobia and pain (right eye). Negative for discharge, redness, itching and visual disturbance.        Describes for about a sensation in the upper lateral portion of the right eye   Respiratory: Negative for cough and shortness of breath.    Cardiovascular: Negative for chest pain.   Gastrointestinal: Positive for abdominal distention. Negative for nausea and vomiting.   Skin: Positive for wound (Cut to right eye). Negative for rash.   Allergic/Immunologic: Negative for immunocompromised state.   Neurological: Negative for weakness and numbness.   Hematological: Does not bruise/bleed easily.   Psychiatric/Behavioral: Negative for confusion. The patient is nervous/anxious.    All other systems reviewed and are negative.    A complete review of systems was performed with pertinent positives and negatives noted in the HPI, and all other systems negative.         Physical Exam   BP: (!) 140/91  Pulse: 84  Temp: 98.4  F (36.9  C)  Resp: 16  SpO2: 100 %      Physical Exam  Vitals signs and nursing note reviewed.   Constitutional:       General: She is in acute distress.      Appearance: She is well-developed. She is not ill-appearing or diaphoretic.      Comments: Patient is wearing a facial mask as noted.  Is very hesitant with anything we do here as it could create a type of neurocognitive or other type of reaction that she has.  Patient describes multiple physical symptoms she deals with with her physician ongoing.  Primarily concerned of the right eye pain.   HENT:      Head: Normocephalic and atraumatic.      Nose: Nose normal.   Eyes:      General: No scleral icterus.     Extraocular Movements: Extraocular movements intact.      Pupils: Pupils are equal, round, and reactive to light.      Comments: Minimal injection of the right eye with some tearing noted.  No periorbital swelling.  Extraoculars are intact   Neck:      Musculoskeletal: Normal range of motion and  neck supple.   Cardiovascular:      Rate and Rhythm: Normal rate.   Pulmonary:      Effort: No respiratory distress.      Breath sounds: No stridor. No wheezing.   Abdominal:      Tenderness: There is no guarding.   Musculoskeletal:         General: No swelling.   Skin:     General: Skin is warm and dry.      Capillary Refill: Capillary refill takes less than 2 seconds.      Coloration: Skin is not jaundiced or pale.      Findings: No erythema or rash.   Neurological:      General: No focal deficit present.      Mental Status: She is alert and oriented to person, place, and time. Mental status is at baseline.   Psychiatric:      Comments: Patient somewhat apprehensive anxious at times somewhat guarded with medications causing some other potential side effects that could cause her some neurocognitive or emotional type distress         ED Course   9:26 AM  The patient was seen and examined by Gomez Farooq MD in Room ED17.        Procedures        In the ER did respect patient's concerns we did not use Alcaine as she was concerned this could cause some reaction.  Therefore we used only fluorescein with saline with one small drop in the right eye.  Initial evaluation of the eye prior did not reveal any gross trauma no gross swelling no chemosis pupils are both equal no foreign body was seen.  Fluorescein staining reviewed revealed just nonspecific uptake in the right upper portion of the conjunctiva corneal area.  It does not involve the visual field.  No other significant findings noted at this point.    Discussed findings with patient with her symptoms etc. most likely seem consistent with a corneal abrasion which treated appropriately with placed on Tobrex eyedrops along with artificial tears to use Tylenol and it should improve in 3 days if any worsening symptoms etc. patient should return or follow-up with MD for recheck.                  No results found for this or any previous visit (from the past 24  hour(s)).  Medications - No data to display          Assessments & Plan (with Medical Decision Making)  30-year-old female presents with right eye injury yesterday.  Patient noted to grocery bag hit her in the right eye sustaining a foreign body sensation although no visual changes findings more consistent with corneal abrasion not significant not affecting visual fields etc.  Minimal findings on fluorescein staining with Woods lamp.  Unable to do a slit-lamp at this point as patient has marked sensitivities of the eye etc.  In the ER we elected to treat her with tobramycin eyedrops along with artificial tears Tylenol follow-up with MD if not better in 3 days return if worsening symptoms agrees with plan discharged with prescriptions.         I have reviewed the nursing notes.    I have reviewed the findings, diagnosis, plan and need for follow up with the patient.    Discharge Medication List as of 5/16/2020  9:50 AM      START taking these medications    Details   polyvinyl alcohol (LIQUIFILM TEARS) 1.4 % ophthalmic solution Place 1 drop into the right eye as needed for dry eyes or other (irritation), Disp-15 mL,R-0, Local Print      tobramycin (TOBREX) 0.3 % ophthalmic solution Place 1-2 drops into the right eye every 4 hours for 3 days, Disp-1 Bottle,R-0, Local Print             Final diagnoses:   Abrasion of right cornea, initial encounter   IJhonatan, am serving as a trained medical scribe to document services personally performed by Gomez Farooq MD, based on the provider's statements to me.     I, Gomez Farooq MD, was physically present and have reviewed and verified the accuracy of this note documented by Jhonatan Gong.      5/16/2020   [facility] EMERGENCY DEPARTMENT    This note was created at least in part by the use of dragon voice dictation system. Inadvertent typographical errors may still exist.  Gomez Farooq MD.    Patient evaluated in the emergency department during the COVID-19  pandemic period. Careful attention to patients safety was addressed throughout the evaluation. Evaluation and treatment management was initiated with disposition made efficiently and appropriate as possible to minimize any risk of potential exposure to patient during this evaluation.       Gomez Farooq MD  05/16/20 6821

## 2020-05-16 NOTE — PROGRESS NOTES
Emergency Social Work Services Note    Date of  Intervention: 20  Last Emergency Department Visit:  20  Care Plan:  No  Collaborated with:  Hal RN; EMR    Data:  Pt presents to the ED with concerns of right eye pain.  Per chart, pt is now ready for discharge.    Intervention:  Auto-case finding due to chart review.  Notes indicate that pt left her apartment and was staying in an Airbnb but that is set to  today on 20.  On 20, pt called and spoke with the triage person at [facility].  This note indicates that pt has a  she is working with to find housing and there is a potential opportunity but turned down for some options due to income.  Pt's name was added to the High Risk shelter waiting list at that time.    SW called and spoke with pt's nurse, Hal.  Pt has been discharged and left with prescription; reported no concerns with discharge destination.    Assessment:  SW chart review and check-in with nurse.    Plan:    Anticipated Disposition:  Home    Barriers to d/c plan:  None.    Follow Up:  SW available as needed.    NOEL Barriga  Social Work Services  Emergency Department   [phone] phone  [phone] pager  9:00am-9:30pm Mon-Sat    On-call pager, [phone], 4:00pm to midnight

## 2020-05-16 NOTE — ED AVS SNAPSHOT
[facility], [place], Emergency Department  [address] [place] [id number]  Phone:  [phone]                                    Cristine Caballero   MRN: [id number]    Department:  [facility], Emergency Department   Date of Visit:  5/16/2020           After Visit Summary Signature Page    I have received my discharge instructions, and my questions have been answered. I have discussed any challenges I see with this plan with the nurse or doctor.    ..........................................................................................................................................  Patient/Patient Representative Signature      ..........................................................................................................................................  Patient Representative Print Name and Relationship to Patient    ..................................................               ................................................  Date                                   Time    ..........................................................................................................................................  Reviewed by Signature/Title    ...................................................              ..............................................  Date                                               Time          22EPIC Rev 08/18

## 2020-05-16 NOTE — DISCHARGE INSTRUCTIONS
Home.  Use the tobrex eye drops to allow good healing and prevent infection.  Artificial tears for lubrication and comfort.  Wear sunglasses as needed.  Tylenol for pain.  See MD for follow up if not better in 3 days.  Return if any concerns.

## 2020-05-21 ENCOUNTER — TRANSFERRED RECORDS (OUTPATIENT)
Dept: HEALTH INFORMATION MANAGEMENT | Facility: CLINIC | Age: 30
End: 2020-05-21

## 2020-07-08 ENCOUNTER — AMBULATORY - HEALTHEAST (OUTPATIENT)
Dept: LAB | Facility: HOSPITAL | Age: 30
End: 2020-07-08

## 2020-07-08 DIAGNOSIS — F31.12 BIPOLAR I DISORDER, MOST RECENT EPISODE (OR CURRENT) MANIC, MODERATE (H): ICD-10-CM

## 2020-07-08 LAB — LITHIUM SERPL-SCNC: 0.5 MMOL/L (ref 0.6–1.2)

## 2020-11-22 ENCOUNTER — HEALTH MAINTENANCE LETTER (OUTPATIENT)
Age: 30
End: 2020-11-22

## 2020-12-25 ENCOUNTER — ANCILLARY PROCEDURE (OUTPATIENT)
Dept: GENERAL RADIOLOGY | Facility: CLINIC | Age: 30
End: 2020-12-25
Attending: PHYSICIAN ASSISTANT
Payer: MEDICARE

## 2020-12-25 ENCOUNTER — HOSPITAL ENCOUNTER (EMERGENCY)
Facility: CLINIC | Age: 30
Discharge: HOME | End: 2020-12-25
Attending: EMERGENCY MEDICINE | Admitting: EMERGENCY MEDICINE
Payer: MEDICARE

## 2020-12-25 ENCOUNTER — NURSE TRIAGE (OUTPATIENT)
Dept: NURSING | Facility: CLINIC | Age: 30
End: 2020-12-25

## 2020-12-25 ENCOUNTER — OFFICE VISIT (OUTPATIENT)
Dept: URGENT CARE | Facility: URGENT CARE | Age: 30
End: 2020-12-25
Payer: MEDICARE

## 2020-12-25 ENCOUNTER — APPOINTMENT (OUTPATIENT)
Dept: ULTRASOUND IMAGING | Facility: CLINIC | Age: 30
End: 2020-12-25
Attending: EMERGENCY MEDICINE
Payer: MEDICARE

## 2020-12-25 VITALS
HEART RATE: 64 BPM | DIASTOLIC BLOOD PRESSURE: 81 MMHG | RESPIRATION RATE: 16 BRPM | TEMPERATURE: 97.8 F | SYSTOLIC BLOOD PRESSURE: 115 MMHG | OXYGEN SATURATION: 96 % | BODY MASS INDEX: 33.66 KG/M2 | WEIGHT: 202 LBS | HEIGHT: 65 IN

## 2020-12-25 VITALS
BODY MASS INDEX: 32.67 KG/M2 | WEIGHT: 202.4 LBS | TEMPERATURE: 97.6 F | HEART RATE: 76 BPM | OXYGEN SATURATION: 97 % | DIASTOLIC BLOOD PRESSURE: 68 MMHG | SYSTOLIC BLOOD PRESSURE: 114 MMHG

## 2020-12-25 DIAGNOSIS — K59.00 CONSTIPATION, UNSPECIFIED CONSTIPATION TYPE: ICD-10-CM

## 2020-12-25 DIAGNOSIS — K29.00 ACUTE GASTRITIS WITHOUT HEMORRHAGE, UNSPECIFIED GASTRITIS TYPE: Primary | ICD-10-CM

## 2020-12-25 DIAGNOSIS — R73.9 HYPERGLYCEMIA: ICD-10-CM

## 2020-12-25 DIAGNOSIS — K80.50 BILIARY COLIC: ICD-10-CM

## 2020-12-25 LAB
ALBUMIN SERPL-MCNC: 4 G/DL (ref 3.4–5)
ALBUMIN UR-MCNC: NEGATIVE MG/DL
ALP SERPL-CCNC: 58 U/L (ref 40–150)
ALT SERPL W P-5'-P-CCNC: 20 U/L (ref 0–50)
AMORPH CRY #/AREA URNS HPF: ABNORMAL /HPF
AMYLASE SERPL-CCNC: 36 U/L (ref 30–110)
ANION GAP SERPL CALCULATED.3IONS-SCNC: 5 MMOL/L (ref 3–14)
APPEARANCE UR: CLEAR
AST SERPL W P-5'-P-CCNC: 11 U/L (ref 0–45)
BASOPHILS # BLD AUTO: 0 10E9/L (ref 0–0.2)
BASOPHILS NFR BLD AUTO: 0.2 %
BILIRUB SERPL-MCNC: 0.2 MG/DL (ref 0.2–1.3)
BILIRUB UR QL STRIP: NEGATIVE
BUN SERPL-MCNC: 22 MG/DL (ref 7–30)
CALCIUM SERPL-MCNC: 8.8 MG/DL (ref 8.5–10.1)
CHLORIDE SERPL-SCNC: 106 MMOL/L (ref 94–109)
CO2 SERPL-SCNC: 27 MMOL/L (ref 20–32)
COLOR UR AUTO: YELLOW
CREAT SERPL-MCNC: 0.73 MG/DL (ref 0.52–1.04)
DIFFERENTIAL METHOD BLD: NORMAL
EOSINOPHIL # BLD AUTO: 0.1 10E9/L (ref 0–0.7)
EOSINOPHIL NFR BLD AUTO: 1.2 %
ERYTHROCYTE [DISTWIDTH] IN BLOOD BY AUTOMATED COUNT: 12.7 % (ref 10–15)
GFR SERPL CREATININE-BSD FRML MDRD: >90 ML/MIN/{1.73_M2}
GLUCOSE SERPL-MCNC: 143 MG/DL (ref 70–99)
GLUCOSE UR STRIP-MCNC: NEGATIVE MG/DL
HBA1C MFR BLD: 5.7 % (ref 0–5.6)
HCG SERPL QL: NEGATIVE
HCT VFR BLD AUTO: 42.5 % (ref 35–47)
HGB BLD-MCNC: 14.3 G/DL (ref 11.7–15.7)
HGB UR QL STRIP: NEGATIVE
KETONES UR STRIP-MCNC: NEGATIVE MG/DL
LEUKOCYTE ESTERASE UR QL STRIP: ABNORMAL
LIPASE SERPL-CCNC: 89 U/L (ref 73–393)
LYMPHOCYTES # BLD AUTO: 1.3 10E9/L (ref 0.8–5.3)
LYMPHOCYTES NFR BLD AUTO: 22.2 %
MCH RBC QN AUTO: 30.6 PG (ref 26.5–33)
MCHC RBC AUTO-ENTMCNC: 33.6 G/DL (ref 31.5–36.5)
MCV RBC AUTO: 91 FL (ref 78–100)
MONOCYTES # BLD AUTO: 0.6 10E9/L (ref 0–1.3)
MONOCYTES NFR BLD AUTO: 10.1 %
MUCOUS THREADS #/AREA URNS LPF: PRESENT /LPF
NEUTROPHILS # BLD AUTO: 3.9 10E9/L (ref 1.6–8.3)
NEUTROPHILS NFR BLD AUTO: 66.3 %
NITRATE UR QL: NEGATIVE
PH UR STRIP: 8 PH (ref 5–7)
PLATELET # BLD AUTO: 237 10E9/L (ref 150–450)
POTASSIUM SERPL-SCNC: 4 MMOL/L (ref 3.4–5.3)
PROT SERPL-MCNC: 7.3 G/DL (ref 6.8–8.8)
RBC # BLD AUTO: 4.67 10E12/L (ref 3.8–5.2)
RBC #/AREA URNS AUTO: 0 /HPF (ref 0–2)
SODIUM SERPL-SCNC: 138 MMOL/L (ref 133–144)
SOURCE: ABNORMAL
SP GR UR STRIP: 1.01 (ref 1–1.03)
SQUAMOUS #/AREA URNS AUTO: <1 /HPF (ref 0–1)
UROBILINOGEN UR STRIP-MCNC: 0.2 MG/DL (ref 0–2)
WBC # BLD AUTO: 5.9 10E9/L (ref 4–11)
WBC #/AREA URNS AUTO: <1 /HPF (ref 0–5)

## 2020-12-25 PROCEDURE — 258N000003 HC RX IP 258 OP 636: Performed by: EMERGENCY MEDICINE

## 2020-12-25 PROCEDURE — 82150 ASSAY OF AMYLASE: CPT | Performed by: PHYSICIAN ASSISTANT

## 2020-12-25 PROCEDURE — 96376 TX/PRO/DX INJ SAME DRUG ADON: CPT

## 2020-12-25 PROCEDURE — 83036 HEMOGLOBIN GLYCOSYLATED A1C: CPT | Performed by: PHYSICIAN ASSISTANT

## 2020-12-25 PROCEDURE — 85025 COMPLETE CBC W/AUTO DIFF WBC: CPT | Performed by: PHYSICIAN ASSISTANT

## 2020-12-25 PROCEDURE — 83690 ASSAY OF LIPASE: CPT | Performed by: EMERGENCY MEDICINE

## 2020-12-25 PROCEDURE — 250N000009 HC RX 250: Performed by: EMERGENCY MEDICINE

## 2020-12-25 PROCEDURE — 80053 COMPREHEN METABOLIC PANEL: CPT | Performed by: PHYSICIAN ASSISTANT

## 2020-12-25 PROCEDURE — 84703 CHORIONIC GONADOTROPIN ASSAY: CPT | Performed by: EMERGENCY MEDICINE

## 2020-12-25 PROCEDURE — 99214 OFFICE O/P EST MOD 30 MIN: CPT | Performed by: PHYSICIAN ASSISTANT

## 2020-12-25 PROCEDURE — 96374 THER/PROPH/DIAG INJ IV PUSH: CPT

## 2020-12-25 PROCEDURE — 250N000011 HC RX IP 250 OP 636: Performed by: EMERGENCY MEDICINE

## 2020-12-25 PROCEDURE — 96375 TX/PRO/DX INJ NEW DRUG ADDON: CPT

## 2020-12-25 PROCEDURE — 36415 COLL VENOUS BLD VENIPUNCTURE: CPT | Performed by: PHYSICIAN ASSISTANT

## 2020-12-25 PROCEDURE — 99285 EMERGENCY DEPT VISIT HI MDM: CPT | Mod: 25

## 2020-12-25 PROCEDURE — 96361 HYDRATE IV INFUSION ADD-ON: CPT

## 2020-12-25 PROCEDURE — 81001 URINALYSIS AUTO W/SCOPE: CPT | Performed by: EMERGENCY MEDICINE

## 2020-12-25 PROCEDURE — 74019 RADEX ABDOMEN 2 VIEWS: CPT | Performed by: RADIOLOGY

## 2020-12-25 PROCEDURE — 83690 ASSAY OF LIPASE: CPT | Performed by: PHYSICIAN ASSISTANT

## 2020-12-25 PROCEDURE — 76705 ECHO EXAM OF ABDOMEN: CPT

## 2020-12-25 RX ORDER — METHYLDOPA 250 MG
1 TABLET ORAL
COMMUNITY
Start: 2020-06-12 | End: 2020-12-29

## 2020-12-25 RX ORDER — SODIUM CHLORIDE 9 MG/ML
INJECTION, SOLUTION INTRAVENOUS CONTINUOUS
Status: DISCONTINUED | OUTPATIENT
Start: 2020-12-25 | End: 2020-12-25 | Stop reason: HOSPADM

## 2020-12-25 RX ORDER — TRAZODONE HYDROCHLORIDE 50 MG/1
50 TABLET, FILM COATED ORAL AT BEDTIME
COMMUNITY
Start: 2020-11-16 | End: 2021-01-01

## 2020-12-25 RX ORDER — CLONAZEPAM 1 MG/1
TABLET ORAL
COMMUNITY
Start: 2020-11-12 | End: 2021-01-01

## 2020-12-25 RX ORDER — RISPERIDONE 2 MG/1
2 TABLET, ORALLY DISINTEGRATING ORAL EVERY MORNING
COMMUNITY
Start: 2020-11-12 | End: 2021-01-01

## 2020-12-25 RX ORDER — ONDANSETRON 4 MG/1
8 TABLET, ORALLY DISINTEGRATING ORAL ONCE
Status: COMPLETED | OUTPATIENT
Start: 2020-12-25 | End: 2020-12-25

## 2020-12-25 RX ORDER — RISPERIDONE 3 MG/1
3 TABLET, ORALLY DISINTEGRATING ORAL AT BEDTIME
COMMUNITY
Start: 2020-11-12 | End: 2021-01-01

## 2020-12-25 RX ORDER — OMEPRAZOLE 40 MG/1
40 CAPSULE, DELAYED RELEASE ORAL DAILY
Qty: 20 CAPSULE | Refills: 0 | Status: SHIPPED | OUTPATIENT
Start: 2020-12-25 | End: 2021-01-01

## 2020-12-25 RX ORDER — FERROUS SULFATE 325(65) MG
325 TABLET ORAL
COMMUNITY
Start: 2020-11-13 | End: 2021-01-01

## 2020-12-25 RX ORDER — OXYCODONE AND ACETAMINOPHEN 5; 325 MG/1; MG/1
1-2 TABLET ORAL EVERY 6 HOURS PRN
Qty: 12 TABLET | Refills: 0 | Status: ON HOLD | OUTPATIENT
Start: 2020-12-25 | End: 2021-01-13

## 2020-12-25 RX ORDER — ONDANSETRON 2 MG/ML
4 INJECTION INTRAMUSCULAR; INTRAVENOUS EVERY 30 MIN PRN
Status: DISCONTINUED | OUTPATIENT
Start: 2020-12-25 | End: 2020-12-25 | Stop reason: HOSPADM

## 2020-12-25 RX ORDER — LANOLIN ALCOHOL/MO/W.PET/CERES
6 CREAM (GRAM) TOPICAL AT BEDTIME
COMMUNITY
Start: 2020-06-10 | End: 2021-01-01

## 2020-12-25 RX ORDER — HYDROMORPHONE HYDROCHLORIDE 1 MG/ML
0.5 INJECTION, SOLUTION INTRAMUSCULAR; INTRAVENOUS; SUBCUTANEOUS ONCE
Status: COMPLETED | OUTPATIENT
Start: 2020-12-25 | End: 2020-12-25

## 2020-12-25 RX ORDER — ONDANSETRON 8 MG/1
8 TABLET, ORALLY DISINTEGRATING ORAL EVERY 8 HOURS PRN
Qty: 30 TABLET | Refills: 0 | Status: SHIPPED | OUTPATIENT
Start: 2020-12-25 | End: 2021-01-01

## 2020-12-25 RX ORDER — FLUVOXAMINE MALEATE 100 MG
TABLET ORAL
COMMUNITY
Start: 2020-08-14 | End: 2021-01-01

## 2020-12-25 RX ORDER — KETOROLAC TROMETHAMINE 15 MG/ML
15 INJECTION, SOLUTION INTRAMUSCULAR; INTRAVENOUS ONCE
Status: COMPLETED | OUTPATIENT
Start: 2020-12-25 | End: 2020-12-25

## 2020-12-25 RX ADMIN — ONDANSETRON 8 MG: 4 TABLET, ORALLY DISINTEGRATING ORAL at 10:15

## 2020-12-25 RX ADMIN — HYDROMORPHONE HYDROCHLORIDE 0.5 MG: 1 INJECTION, SOLUTION INTRAMUSCULAR; INTRAVENOUS; SUBCUTANEOUS at 18:25

## 2020-12-25 RX ADMIN — SODIUM CHLORIDE 1000 ML: 9 INJECTION, SOLUTION INTRAVENOUS at 17:08

## 2020-12-25 RX ADMIN — KETOROLAC TROMETHAMINE 15 MG: 15 INJECTION, SOLUTION INTRAMUSCULAR; INTRAVENOUS at 17:13

## 2020-12-25 RX ADMIN — ONDANSETRON 4 MG: 2 INJECTION INTRAMUSCULAR; INTRAVENOUS at 18:48

## 2020-12-25 RX ADMIN — ONDANSETRON 4 MG: 2 INJECTION INTRAMUSCULAR; INTRAVENOUS at 17:13

## 2020-12-25 RX ADMIN — FAMOTIDINE 20 MG: 10 INJECTION, SOLUTION INTRAVENOUS at 17:13

## 2020-12-25 ASSESSMENT — ENCOUNTER SYMPTOMS
RECTAL PAIN: 0
NEUROLOGICAL NEGATIVE: 1
BLOOD IN STOOL: 0
NAUSEA: 1
ABDOMINAL PAIN: 1
FREQUENCY: 0
CARDIOVASCULAR NEGATIVE: 1
ENDOCRINE NEGATIVE: 1
EYES NEGATIVE: 1
CONSTITUTIONAL NEGATIVE: 1
PSYCHIATRIC NEGATIVE: 1
FLANK PAIN: 1
DYSURIA: 0
VOMITING: 0
DIARRHEA: 0
RESPIRATORY NEGATIVE: 1
HEMATURIA: 0
ABDOMINAL DISTENTION: 1
CONSTIPATION: 1
DIFFICULTY URINATING: 0
ANAL BLEEDING: 0

## 2020-12-25 ASSESSMENT — MIFFLIN-ST. JEOR: SCORE: 1632.15

## 2020-12-25 NOTE — TELEPHONE ENCOUNTER
Ivonne gave me her permission to speak to SARA Mcg[facility], [phone].    Ivonne was seen in  earlier today for acute gastritis, vomiting and constipation.    [facility] Her symptoms have not improved.She wasn't able to give a urine specimen.  Since back at [facility] she has vomited and urinated x 1.    Zofran has been given. Continues to feel nauseated. Pain persists.  Patient and RN given option of Ivonne going into an ER or calling pcp for advice.  Ivonne will go to an ER, either [facility] or [place].    COVID 19 Nurse Triage Plan/Patient Instructions    Please be aware that novel coronavirus (COVID-19) may be circulating in the community. If you develop symptoms such as fever, cough, or SOB or if you have concerns about the presence of another infection including coronavirus (COVID-19), please contact your health care provider or visit [email or web address].     Disposition/Instructions    ED Visit recommended. Follow protocol based instructions.     Bring Your Own Device:  Please also bring your smart device(s) (smart phones, tablets, laptops) and their charging cables for your personal use and to communicate with your care team during your visit.    Thank you for taking steps to prevent the spread of this virus.  o Limit your contact with others.  o Wear a simple mask to cover your cough.  o Wash your hands well and often.    Resources    [facility]: About COVID-19: [email or web address]    CDC: What to Do If You're Sick: www.cdc.gov/coronavirus/2019-ncov/about/steps-when-sick.html    CDC: Ending Home Isolation: www.cdc.gov/coronavirus/2019-ncov/hcp/disposition-in-home-patients.html     CDC: Caring for Someone: www.cdc.gov/coronavirus/2019-ncov/if-you-are-sick/care-for-someone.html     [facility]: Interim Guidance for Hospital Discharge to Home: www.health.[facility].[place].us/diseases/coronavirus/hcp/hospdischarge.pdf    [facility]  trials (COVID-19 research studies): [email or web address]     Below are the COVID-19 hotlines at the [place] Department of Health ([facility]). Interpreters are available.   o For health questions: Call 828-523-8075 or 1-616.673.7363 (7 a.m. to 7 p.m.)  o For questions about schools and childcare: Call 253-465-6469 or 1-614.809.7396 (7 a.m. to 7 p.m.)     Additional Information    Negative: Severe difficulty breathing (e.g., struggling for each breath, speaks in single words)    Negative: Shock suspected (e.g., cold/pale/clammy skin, too weak to stand, low BP, rapid pulse)    Negative: Difficult to awaken or acting confused (e.g., disoriented, slurred speech)    Negative: Passed out (i.e., lost consciousness, collapsed and was not responding)    Negative: Visible sweat on face or sweat dripping down face    Negative: Sounds like a life-threatening emergency to the triager    [1] MILD-MODERATE pain AND [2] constant AND [3] present > 2 hours    Protocols used: ABDOMINAL PAIN - UPPER-A-    Lindy FRANCO RN [facility]

## 2020-12-25 NOTE — ED TRIAGE NOTES
Seen at  today and still unable to tolerate fluids. Sent here from [facility] for further eval and treatment. Upper abdominal pain started at 0300 this AM, nausea and vomiting at 0900

## 2020-12-25 NOTE — ED PROVIDER NOTES
History     Chief Complaint:    Abdominal Pain and Nausea & Vomiting      HPI   Cristine Caballero is a 30 year old female who presents for evaluation of upper abdominal pain, right flank pain, nausea and vomiting.  Patient states she had a lot to eat last night and around 3 AM awoke with upper abdominal pain and vomiting.  She did have an episode of streaks of blood in her emesis.  Patient endorses recent hard stools but no diarrhea.  No fever or cough or dysuria or hematuria.  Patient denies prior abdominal surgeries.  She went to urgent care prior to arrival and had labs per below.  She was apparently discharged with antacid and antiemetics but those were not helpful and so she presented the ER.  Patient is currently in residential treatment for underlying bipolar disorder.     labs from 12/25/20:    CBC:    WBC 4.0 - 11.0 10e9/L 5.9      RBC Count 3.8 - 5.2 10e12/L 4.67     Hemoglobin 11.7 - 15.7 g/dL 14.3     Hematocrit 35.0 - 47.0 % 42.5     MCV 78 - 100 fl 91     MCH 26.5 - 33.0 pg 30.6     MCHC 31.5 - 36.5 g/dL 33.6     RDW 10.0 - 15.0 % 12.7     Platelet Count 150 - 450 10e9/L 237     % Neutrophils % 66.3     % Lymphocytes % 22.2     % Monocytes % 10.1     % Eosinophils % 1.2     % Basophils % 0.2     Absolute Neutrophil 1.6 - 8.3 10e9/L 3.9     Absolute Lymphocytes 0.8 - 5.3 10e9/L 1.3     Absolute Monocytes 0.0 - 1.3 10e9/L 0.6     Absolute Eosinophils 0.0 - 0.7 10e9/L 0.1     Absolute Basophils 0.0 - 0.2 10e9/L 0.0     Diff Method  Automated Method      CMP       Ref Range & Units 10:07 AM Resulting Agency    Sodium 133 - 144 mmol/L 138  [facility]    Potassium 3.4 - 5.3 mmol/L 4.0  [facility]    Chloride 94 - 109 mmol/L 106  [facility]    Carbon Dioxide 20 - 32 mmol/L 27  [facility]    Anion Gap 3 - 14 mmol/L 5  [facility]    Glucose 70 - 99 mg/dL 143High   [facility]  [facility]    Urea Nitrogen 7 - 30 mg/dL 22  [facility]    Creatinine 0.52 - 1.04 mg/dL 0.73  [facility]    GFR Estimate >60 mL/min/(1.73_m2) >90           Allergies:    Allergies   Allergen Reactions     Betadex Nausea and Vomiting     Codeine Other (See Comments)     Other reaction(s): Tremors     Lavender Oil Nausea     Penicillin G      Sulfa Drugs      Fentanyl Rash     Shauna oral rash after fentanyl during egd in 2011        Medications:           oxyCODONE-acetaminophen (PERCOCET) 5-325 MG tablet       Bioflavonoid Products (STEPHANIE-C) TABS       clonazePAM (KLONOPIN) 1 MG tablet       ferrous sulfate (FEROSUL) 325 (65 Fe) MG tablet       fluvoxaMINE (LUVOX) 100 MG tablet       magnesium hydroxide (MILK OF MAGNESIA) 400 MG/5ML suspension       melatonin 3 MG tablet       metoclopramide (REGLAN) 5 MG tablet       omeprazole (PRILOSEC) 40 MG DR capsule       ondansetron (ZOFRAN-ODT) 8 MG ODT tab       polyvinyl alcohol (LIQUIFILM TEARS) 1.4 % ophthalmic solution       risperiDONE (RISPERDAL M-TABS) 2 MG ODT       risperiDONE (RISPERDAL M-TABS) 3 MG ODT       traZODone (DESYREL) 50 MG tablet        Problem List:      Patient Active Problem List    Diagnosis Date Noted     Dyspareunia in female 01/02/2020     Priority: Medium     Pelvic floor dysfunction 08/12/2019     Priority: Medium     History of hidradenitis suppurativa 08/12/2019     Priority: Medium     OCD (obsessive compulsive disorder)      Priority: Medium     stable with CBT        Abdominal pain 04/13/2019     Priority: Medium     GBS (group B streptococcus) infection 04/04/2019     Priority: Medium     Final urine culture on 4/4/19 grew out <10,000 colonies/mL Streptococcus agalactiae sero group B        Small intestinal bacterial overgrowth 10/03/2017     Priority: Medium     Irritable bowel syndrome 02/14/2014     Priority: Medium     Environmental allergies 01/14/2014     Priority: Medium      Moderate recurrent major depression (H) 02/15/2013     Priority: Medium     Generalized anxiety disorder 08/28/2012     Priority: Medium        Past Medical History:      Past Medical History:   Diagnosis Date     Anxiety disorder      Bipolar disorder (H)      Depressive disorder      Bernadine-Danlos syndrome      Environmental allergies      Gastroesophageal reflux disease      Hx of eating disorder      Hypertension      IBS (irritable bowel syndrome)      OCD (obsessive compulsive disorder)      Small intestinal bacterial overgrowth 2018     Suicide attempt by multiple drug overdose (H) 04/21/2016     Vitamin D deficiency        Past Surgical History:      Past Surgical History:   Procedure Laterality Date     ABDOMEN SURGERY  01/2012    Exploratory Laparoscopy     biopsy  8-    vulvar     BIOPSY      GI, vulva- OK     COLONOSCOPY  2011     COLONOSCOPY N/A 5/15/2019    Procedure: Colonoscopy, With Polypectomy And Biopsy;  Surgeon: Perico Meek MD;  Location: [facility]     COLONOSCOPY WITH CO2 INSUFFLATION N/A 5/15/2019    Procedure: COLONOSCOPY, WITH CO2 INSUFFLATION;  Surgeon: Perico Meek MD;  Location: [facility]     COMBINED ESOPHAGOSCOPY, GASTROSCOPY, DUODENOSCOPY (EGD) WITH CO2 INSUFFLATION N/A 5/15/2019    Procedure: ESOPHAGOGASTRODUODENOSCOPY, WITH CO2 INSUFFLATION;  Surgeon: Perico Meek MD;  Location: [facility] OR     DENTAL SURGERY  2011     ENT SURGERY  2014    intubation & NG tube     ESOPHAGOSCOPY, GASTROSCOPY, DUODENOSCOPY (EGD), COMBINED  10/11/2011    Procedure:COMBINED ESOPHAGOSCOPY, GASTROSCOPY, DUODENOSCOPY (EGD), BIOPSY SINGLE OR MULTIPLE; Surgeon:TALA MONTEMAYOR; Location:[facility]     ESOPHAGOSCOPY, GASTROSCOPY, DUODENOSCOPY (EGD), COMBINED N/A 5/15/2019    Procedure: Esophagogastroduodenoscopy, With Biopsy;  Surgeon: Perico Meek MD;  Location: [facility]     HC BREATH HYDROGEN TEST  10/4/2011    Procedure:HYDROGEN BREATH TEST; Surgeon:RADHA EPSTEIN; Location:[facility]     LAPAROSCOPY        ORTHOPEDIC SURGERY      left wrist- screw in left scaphiod     TONSILLECTOMY       WRIST SURGERY  2004    screw placed L wrist after fracture       Family History:      Family History   Problem Relation Age of Onset     Depression Mother      Allergies Mother      Asthma Mother      Thyroid Disease Mother      Eye Disorder Mother      Blood Disease Mother          essential thrombocytosis     Cancer Mother         AML, recent BMT     Anxiety Disorder Mother      Mental Illness Mother         Hoarding- OCD     Depression Sister      Anxiety Disorder Sister      Mental Illness Sister         OCD_ HOarding     Genitourinary Problems Sister      Asthma Sister      Eye Disorder Sister      Allergies Sister      Allergies Sister      Mental Illness Sister         ADHD     Depression Maternal Grandmother      Heart Disease Maternal Grandmother      Thyroid Disease Maternal Grandmother      Depression Other         aunt     Anxiety Disorder Other      Genitourinary Problems Other         aunt     Suicide Other      Heart Disease Maternal Grandfather      C.A.D. Maternal Grandfather      C.A.D. Paternal Grandfather      Cancer Paternal Grandfather      Eye Disorder Father      Mental Illness Father         OCPD?     Allergies Other         uncles       Social History:    Marital Status:  Single [1]  Social History     Tobacco Use     Smoking status: Never Smoker     Smokeless tobacco: Never Used   Substance Use Topics     Alcohol use: Yes     Alcohol/week: 3.0 - 6.0 standard drinks     Types: 1 - 2 Glasses of wine, 1 - 2 Cans of beer, 1 - 2 Shots of liquor per week     Frequency: Monthly or less     Drinks per session: 1 or 2     Binge frequency: Monthly     Comment: 1-2 EtOH drinks 2 days per week     Drug use: No        Review of Systems  A 10 point ROS was obtained and negative except as noted here and in HPI      Physical Exam   First Vitals:  BP: 123/78  Pulse: 72  Temp: 97.8  F (36.6  C)  Resp: 16  Height: 165.1 cm (5'  "5\")  Weight: 91.6 kg (202 lb)  SpO2: 99 %      Physical Exam  VS: Reviewed per above  HENT: Mucous membranes moist  EYES: sclera anicteric  CV: Rate as noted, regular rhythm.   RESP: Effort normal. Breath sounds are normal bilaterally.  GI: mild RUQ tenderness without rebound/guarding, not distended.  NEURO: Alert, moving all extremities  MSK: No deformity of the extremities  SKIN: Warm and dry    Emergency Department Course   Imaging:  Abdomen US, limited (RUQ only)   Final Result   IMPRESSION:   1.  Cholelithiasis without cholecystitis.   2.  Fatty liver.      CORINE BECKER MD          Laboratory:  Labs Ordered and Resulted from Time of ED Arrival Up to the Time of Departure from the ED   LIPASE   HCG QUALITATIVE   ROUTINE UA WITH MICROSCOPIC       Interventions:  Medications   0.9% sodium chloride BOLUS (1,000 mLs Intravenous New Bag 12/25/20 1708)     Followed by   sodium chloride 0.9% infusion (has no administration in time range)   ondansetron (ZOFRAN) injection 4 mg (4 mg Intravenous Given 12/25/20 1713)   HYDROmorphone (PF) (DILAUDID) injection 0.5 mg (has no administration in time range)   ketorolac (TORADOL) injection 15 mg (15 mg Intravenous Given 12/25/20 1713)   famotidine (PEPCID) injection 20 mg (20 mg Intravenous Given 12/25/20 1713)         Emergency Department Course:  I assessed the patient  Recheck the patient and updated patient on the results       Impression & Plan           Medical Decision Making:  Patient presents to the ER for evaluation of right upper quadrant pain, flank pain, nausea and vomiting since around 3 AM this morning.  This was in the setting of eating a large meal last night.  On arrival vital signs are reassuring.  On exam she has tenderness of the right upper quadrant, albeit mild.  There are no peritoneal signs to suggest sinister/surgical intraabdominal process.  Labs reviewed from urgent care today show evidence of no leukocytosis, normal LFTs, no evidence of YAN, stable " hemoglobin.  Based on history I have low suspicion for brisk GI bleed.  Pregnancy testing here is negative, no evidence of pancreatitis.  Right upper quadrant ultrasound shows evidence of cholelithiasis without cholecystitis.  CBD was reported to be enlarged but there is no obstructive LFT pattern or signs of cholangitis at this time.  Patient was given antiemetics and Toradol and IV fluids here.  On recheck, she is feeling better but requested a dose of pain medication prior to discharge due to lingering pain, which she tolerated.  Urinalysis without signs UTI.  We discussed that the likely etiology of her symptoms to be biliary colic.  We discussed dietary modifications and importance of following up with general surgery.  She already has a prescription for Zofran and was given a prescription for Percocet as well.    Diagnosis:    ICD-10-CM    1. Biliary colic  K80.50        Disposition:  discharged to home    Discharge Medications:  New Prescriptions    OXYCODONE-ACETAMINOPHEN (PERCOCET) 5-325 MG TABLET    Take 1-2 tablets by mouth every 6 hours as needed for severe pain       Ankit Herrera MD  12/25/2020   [facility] EMERGENCY DEPT       Ankit Herrera MD  12/25/20 6584

## 2020-12-25 NOTE — PROGRESS NOTES
Clinic Administered Medication Documentation    Oral Medication Documentation    Patient was given maalox and lidocaine . Prior to medication administration, verified patients identity using patient s name and date of birth. Please see MAR and medication order for additional information.     Was entire amount of medication used? Yes  Expiration Date:  lidocaine 02/25/2022. Maalox 04/25/2022

## 2020-12-25 NOTE — LETTER
December 25, 2020      To Whom It May Concern:      Cristine Caballero was seen in our Emergency Department today, 12/25/20.  She was diagnosed with gallstones.  Because of this, she should adhere to a low-fat diet.  Fatty foods will make her more likely to have gall stone attacks and abdominal pain.  She should follow-up with a general surgeon to discuss having her gallbladder removed.  If she has uncontrolled pain despite the pain medications or develops fevers or cannot keep down liquids, she should return to the ER.    Sincerely,        Ankit Herrera MD

## 2020-12-25 NOTE — ED AVS SNAPSHOT
[facility] Emergency Dept  [address] [address]  Phone: [phone]  Fax: [phone]                                    Cristine Caballero   MRN: [id number]    Department: [facility] Emergency Dept   Date of Visit: 12/25/2020           After Visit Summary Signature Page    I have received my discharge instructions, and my questions have been answered. I have discussed any challenges I see with this plan with the nurse or doctor.    ..........................................................................................................................................  Patient/Patient Representative Signature      ..........................................................................................................................................  Patient Representative Print Name and Relationship to Patient    ..................................................               ................................................  Date                                   Time    ..........................................................................................................................................  Reviewed by Signature/Title    ...................................................              ..............................................  Date                                               Time          22EPIC Rev 08/18

## 2020-12-25 NOTE — TELEPHONE ENCOUNTER
Reason for call;  Pt called - Rx from [facility] sent to a closed pharmacy . \   FNA  conferenced to [facility] staff  RICK Pimentel, who agrees to resent RXs to new pharmacy that is open : [facility] on [address] and Pt declines triage or other help .         Caller Verbalizes understanding and denies further questions and will call back if  symptoms to triage or questions  .  Debora Enriquez RN  - [facility] Nurse Advisor

## 2020-12-25 NOTE — PATIENT INSTRUCTIONS
Patient Education     Gastritis (Adult)    Gastritis is inflammation and irritation of the stomach lining. You can have it for a short time (acute) or be long lasting (chronic). Infection with bacteria called H pylori most often causes gastritis. More than a third of people in the [place] have these bacteria in their bodies. In many cases, H pylori causes no problems or symptoms. In some people, though, the infection irritates the stomach lining and causes gastritis. H. pylori may be diagnosed through blood, stool, or breath tests, we well as through biopsy during an endoscopy. Other causes of stomach irritation include drinking alcohol, smoking or chewing tobacco, or taking pain-relieving medicines called NSAIDs (such as aspirin or ibuprofen). Certain drugs (such as cocaine) and immune conditions can also cause gastritis.  Symptoms of gastritis can include:    Belly pain or bloating    Feeling full quickly    Loss of appetite    Nausea or vomiting    Vomiting blood or having black stools    Feeling more tired than usual  An inflamed and irritated stomach lining is more likely to develop a sore called an ulcer. To help prevent this, gastritis should be treated.  Home care  If needed, our healthcare provider may prescribe medicines. If you have H pylori infection, treating it will likely relieve your symptoms. Other changes can help reduce stomach irritation and help it heal.    If you have been prescribed medicines for H pylori infection, take them as directed. Take all of the medicine until it is finished or your healthcare provider tells you to stop, even if you feel better.    Your healthcare provider may advise you not to take NSAIDs. If you take daily aspirin for your heart or other medical reasons, do not stop without talking to your healthcare provider first.    Don't drink alcohol.    Stop smoking. Smoking can irritate the stomach and delay healing. As much as possible, stay away from second hand  smoke.  Follow-up care  Follow up with your healthcare provider, or as advised by our staff. You may need testing to check for inflammation or an ulcer.  When to seek medical advice  Call your healthcare provider for any of the following:    Stomach pain that gets worse or moves to the lower right belly (appendix area)    Chest pain that appears or gets worse, or spreads to the back, neck, shoulder, or arm    Frequent vomiting (can t keep down liquids)    Blood in the stool or vomit (red or black in color)    Feeling weak or dizzy    Shortness of breath    Unexplained weight loss    Fever of 100.4 F (38 C) or higher, or as directed by your healthcare provider  Tani last reviewed this educational content on 3/1/2018    8504-9175 The [facility], [employer]. [address] [place] [address], [place] [address]. All rights reserved. This information is not intended as a substitute for professional medical care. Always follow your healthcare professional's instructions.

## 2020-12-26 LAB — LIPASE SERPL-CCNC: 104 U/L (ref 73–393)

## 2020-12-29 ENCOUNTER — VIRTUAL VISIT (OUTPATIENT)
Dept: SURGERY | Facility: CLINIC | Age: 30
End: 2020-12-29
Payer: MEDICARE

## 2020-12-29 DIAGNOSIS — K80.20 SYMPTOMATIC CHOLELITHIASIS: Primary | ICD-10-CM

## 2020-12-29 PROCEDURE — 99442 PR PHYSICIAN TELEPHONE EVALUATION 11-20 MIN: CPT | Mod: 95 | Performed by: SURGERY

## 2020-12-29 NOTE — PROGRESS NOTES
"Cristine Caballero is a 30 year old female who is being evaluated via a billable telephone visit.      The patient has been notified of following:     \"This telephone visit will be conducted via a call between you and your physician/provider. We have found that certain health care needs can be provided without the need for a physical exam.  This service lets us provide the care you need with a short phone conversation.  If a prescription is necessary we can send it directly to your pharmacy.  If lab work is needed we can place an order for that and you can then stop by our lab to have the test done at a later time.    Telephone visits are billed at different rates depending on your insurance coverage. During this emergency period, for some insurers they may be billed the same as an in-person visit.  Please reach out to your insurance provider with any questions.    If during the course of the call the physician/provider feels a telephone visit is not appropriate, you will not be charged for this service.\"    Patient has given verbal consent for Telephone visit?  Yes    What phone number would you like to be contacted at? [phone]    How would you like to obtain your AVS? Anum    [facility]  Surgery Consultation      HPI: Patient is a 30 year old female who is here for consultation requested by Johana Khanna [phone] for evaluation of Symptomatic cholelithiasis.The patient describes having symptoms for the last 1 weeks.  She had a severe episode prompting her to go to the emergency room.  It was provoked by eating a large Calvin meal.  The pain is mainly located in the RUQ area. The patient rates the pain a 10 out of 10.The pain is exacerbated by oral intake. The pain is relieved by rest.  Since the attack subsided after 1 day she only complains of intermittent right upper quadrant pain.  She rates these at 2 out of 3 and occur with oral intake.  Patient denies fevers, chills, " nausea, vomiting, jaundice, changes in stool or urine, headache, SOB, chest pain.    PMH:   has a past medical history of Anxiety disorder, Bipolar disorder (H), Depressive disorder, Bernadine-Danlos syndrome, Environmental allergies, Gastroesophageal reflux disease, eating disorder, Hypertension, IBS (irritable bowel syndrome), OCD (obsessive compulsive disorder), Small intestinal bacterial overgrowth (2018), Suicide attempt by multiple drug overdose (H) (04/21/2016), and Vitamin D deficiency.  PSH:    has a past surgical history that includes laparoscopy; Wrist surgery (2004); BREATH HYDROGEN TEST (10/4/2011); Dental surgery (2011); biopsy (8-); biopsy; Esophagoscopy, gastroscopy, duodenoscopy (EGD), combined (10/11/2011); colonoscopy (2011); Tonsillectomy; ENT surgery (2014); orthopedic surgery; Abdomen surgery (01/2012); Combined Esophagoscopy, Gastroscopy, Duodenoscopy (Egd) With Co2 Insufflation (N/A, 5/15/2019); Colonoscopy with CO2 insufflation (N/A, 5/15/2019); Esophagoscopy, gastroscopy, duodenoscopy (EGD), combined (N/A, 5/15/2019); and Colonoscopy (N/A, 5/15/2019).  Social History:   reports that she has never smoked. She has never used smokeless tobacco. She reports current alcohol use of about 3.0 - 6.0 standard drinks of alcohol per week. She reports that she does not use drugs.  Family History:  family history includes Allergies in her mother, sister, sister, and another family member; Anxiety Disorder in her mother, sister, and another family member; Asthma in her mother and sister; Blood Disease in her mother; C.A.D. in her maternal grandfather and paternal grandfather; Cancer in her mother and paternal grandfather; Depression in her maternal grandmother, mother, sister, and another family member; Eye Disorder in her father, mother, and sister; Genitourinary Problems in her sister and another family member; Heart Disease in her maternal grandfather and maternal grandmother; Mental Illness in  her father, mother, sister, and sister; Suicide in an other family member; Thyroid Disease in her maternal grandmother and mother.  Medications/Allergies: Home medications and allergies reviewed.    ROS:  The 10 point Review of Systems is negative other than noted in the HPI.      All new lab and imaging data was reviewed.     Impression and Plan:  Patient is a 30 year old female with Symptomatic cholelithiasis.    PLAN:   I discussed the pathophysiology of gallbladder disease and complications of cholecystitis and choledocholithiasis with the patient.  Patient has ongoing symptoms compatible with symptomatic cholelithiasis.  She would like to move forward with laparoscopic cholecystectomy in the near future.  She will schedule at her convenience.  I also discussed the risks associated with the procedure including, but not limited to infection, bleeding, conversion to open, bile leak, bile duct injury, retained gallstones, pneumonia, MI, and anesthesia complications with the patient.  I also discussed if a complication did occur it may require further surgical intervention during or after the procedure. The patient indicated understanding of the discussion, asked appropriate questions, and provided consent. I provided the patient an information pamphlet. I have recommended a low fat diet and instructed the patient to go to ER if they developed persistent pain, persistent nausea and vomiting, or yellowness of skin.      Thank you very much for this consult.    Frank Christy M.D.  [facility]  [phone]    Please route or send letter to:  Primary Care Provider (PCP) and Referring Provider

## 2020-12-30 ENCOUNTER — TELEPHONE (OUTPATIENT)
Dept: SURGERY | Facility: CLINIC | Age: 30
End: 2020-12-30

## 2020-12-30 NOTE — TELEPHONE ENCOUNTER
Orders received for Dawit felize with Dr. Frank Christy.    Left message for patient to call me at her convenience to schedule surgery.     Radha ALEXANDRE    Surgery Coordinator  [facility]  [facility]  [phone]

## 2020-12-31 DIAGNOSIS — Z11.59 ENCOUNTER FOR SCREENING FOR OTHER VIRAL DISEASES: Primary | ICD-10-CM

## 2020-12-31 PROBLEM — K80.20 SYMPTOMATIC CHOLELITHIASIS: Status: ACTIVE | Noted: 2020-12-31

## 2021-01-01 ENCOUNTER — HEALTH MAINTENANCE LETTER (OUTPATIENT)
Age: 31
End: 2021-01-01

## 2021-01-01 ENCOUNTER — TRANSCRIBE ORDERS (OUTPATIENT)
Dept: OTHER | Age: 31
End: 2021-01-01

## 2021-01-01 ENCOUNTER — OFFICE VISIT (OUTPATIENT)
Dept: OBGYN | Facility: CLINIC | Age: 31
End: 2021-01-01
Attending: NURSE PRACTITIONER
Payer: MEDICARE

## 2021-01-01 ENCOUNTER — LAB (OUTPATIENT)
Dept: LAB | Facility: CLINIC | Age: 31
End: 2021-01-01
Attending: NURSE PRACTITIONER
Payer: MEDICARE

## 2021-01-01 ENCOUNTER — MYC MEDICAL ADVICE (OUTPATIENT)
Dept: OBGYN | Facility: CLINIC | Age: 31
End: 2021-01-01

## 2021-01-01 VITALS
SYSTOLIC BLOOD PRESSURE: 115 MMHG | WEIGHT: 199.7 LBS | HEART RATE: 93 BPM | DIASTOLIC BLOOD PRESSURE: 81 MMHG | BODY MASS INDEX: 33.23 KG/M2

## 2021-01-01 DIAGNOSIS — N91.2 AMENORRHEA: ICD-10-CM

## 2021-01-01 DIAGNOSIS — N91.2 AMENORRHEA: Primary | ICD-10-CM

## 2021-01-01 DIAGNOSIS — R73.09 ELEVATED HEMOGLOBIN A1C: ICD-10-CM

## 2021-01-01 DIAGNOSIS — E28.2 PCOS (POLYCYSTIC OVARIAN SYNDROME): Primary | ICD-10-CM

## 2021-01-01 LAB
17OHP SERPL-MCNC: 23 NG/DL
FSH SERPL-ACNC: 7 IU/L
PROLACTIN SERPL-MCNC: 70 UG/L (ref 3–27)
TESTOST SERPL-MCNC: 13 NG/DL (ref 8–60)

## 2021-01-01 PROCEDURE — 99203 OFFICE O/P NEW LOW 30 MIN: CPT | Mod: GC | Performed by: OBSTETRICS & GYNECOLOGY

## 2021-01-01 PROCEDURE — 83498 ASY HYDROXYPROGESTERONE 17-D: CPT

## 2021-01-01 PROCEDURE — 84403 ASSAY OF TOTAL TESTOSTERONE: CPT

## 2021-01-01 PROCEDURE — 83001 ASSAY OF GONADOTROPIN (FSH): CPT

## 2021-01-01 PROCEDURE — 84146 ASSAY OF PROLACTIN: CPT

## 2021-01-01 PROCEDURE — G0463 HOSPITAL OUTPT CLINIC VISIT: HCPCS

## 2021-01-01 PROCEDURE — 36415 COLL VENOUS BLD VENIPUNCTURE: CPT

## 2021-01-01 RX ORDER — CLONAZEPAM 1 MG/1
1 TABLET ORAL DAILY
COMMUNITY

## 2021-01-01 RX ORDER — CHOLESTYRAMINE LIGHT 4 G/5.7G
4 POWDER, FOR SUSPENSION ORAL 2 TIMES DAILY
COMMUNITY

## 2021-01-01 RX ORDER — CLOMIPRAMINE HYDROCHLORIDE 50 MG/1
50 CAPSULE ORAL AT BEDTIME
COMMUNITY

## 2021-01-01 RX ORDER — CLONAZEPAM 0.5 MG/1
0.75 TABLET ORAL 2 TIMES DAILY
COMMUNITY

## 2021-01-01 RX ORDER — RISPERIDONE 1 MG/1
1 TABLET ORAL DAILY
COMMUNITY

## 2021-01-01 RX ORDER — POLYETHYLENE GLYCOL 3350 17 G/17G
1 POWDER, FOR SOLUTION ORAL DAILY PRN
COMMUNITY

## 2021-01-01 RX ORDER — RISPERIDONE 4 MG/1
4 TABLET ORAL DAILY
COMMUNITY

## 2021-01-01 RX ORDER — BENZTROPINE MESYLATE 1 MG/1
1 TABLET ORAL DAILY
COMMUNITY

## 2021-01-01 RX ORDER — OMEPRAZOLE 40 MG/1
40 CAPSULE, DELAYED RELEASE ORAL DAILY PRN
COMMUNITY

## 2021-01-01 RX ORDER — ACETAMINOPHEN 325 MG/1
325-650 TABLET ORAL EVERY 4 HOURS PRN
COMMUNITY

## 2021-01-01 RX ORDER — RISPERIDONE 0.5 MG/1
0.5 TABLET ORAL 2 TIMES DAILY
COMMUNITY

## 2021-01-01 RX ORDER — CALCIUM CARBONATE 750 MG/1
500 TABLET, CHEWABLE ORAL 2 TIMES DAILY PRN
COMMUNITY

## 2021-01-01 RX ORDER — LANOLIN ALCOHOL/MO/W.PET/CERES
1 CREAM (GRAM) TOPICAL
COMMUNITY

## 2021-01-01 RX ORDER — ONDANSETRON 8 MG/1
TABLET, FILM COATED ORAL EVERY 8 HOURS PRN
COMMUNITY

## 2021-01-01 ASSESSMENT — ANXIETY QUESTIONNAIRES
5. BEING SO RESTLESS THAT IT IS HARD TO SIT STILL: NEARLY EVERY DAY
3. WORRYING TOO MUCH ABOUT DIFFERENT THINGS: NEARLY EVERY DAY
1. FEELING NERVOUS, ANXIOUS, OR ON EDGE: NEARLY EVERY DAY
GAD7 TOTAL SCORE: 21
7. FEELING AFRAID AS IF SOMETHING AWFUL MIGHT HAPPEN: NEARLY EVERY DAY
6. BECOMING EASILY ANNOYED OR IRRITABLE: NEARLY EVERY DAY
GAD7 TOTAL SCORE: 21
2. NOT BEING ABLE TO STOP OR CONTROL WORRYING: NEARLY EVERY DAY

## 2021-01-01 ASSESSMENT — PATIENT HEALTH QUESTIONNAIRE - PHQ9
5. POOR APPETITE OR OVEREATING: NEARLY EVERY DAY
SUM OF ALL RESPONSES TO PHQ QUESTIONS 1-9: 23

## 2021-01-04 ENCOUNTER — TELEPHONE (OUTPATIENT)
Dept: SURGERY | Facility: CLINIC | Age: 31
End: 2021-01-04

## 2021-01-06 ENCOUNTER — TELEPHONE (OUTPATIENT)
Dept: SURGERY | Facility: CLINIC | Age: 31
End: 2021-01-06

## 2021-01-06 NOTE — TELEPHONE ENCOUNTER
Name of caller: Patient    Reason for Call:  Scheduled for Lap ede 1/13 and has a couple of questions about what to expect before and after surgery    Surgeon:  Dr. Christy    Recent Surgery:  No    If yes, when & what type:  N/A      Best phone number to reach pt at is: [phone]  Ok to leave a message with medical info? Yes.    Pharmacy preferred (if calling for a refill): n/a

## 2021-01-06 NOTE — TELEPHONE ENCOUNTER
Patient has questions about upcoming surgery with JDS 1/13/21 Dawit mera     Please call    Phone: [phone]    Message ok

## 2021-01-06 NOTE — TELEPHONE ENCOUNTER
Patient wondering if there is any other way to treat cholelithiasis, other than surgery.    Informed her that low fat diet can help control inflammation to the gallbladder, but that surgery is the only treatment option for the stones.  Because she is symptomatic, surgery is the best option.    She verbalized understanding    Sharona Lopez RN-BSN

## 2021-01-11 DIAGNOSIS — Z11.59 ENCOUNTER FOR SCREENING FOR OTHER VIRAL DISEASES: ICD-10-CM

## 2021-01-11 LAB
SARS-COV-2 RNA RESP QL NAA+PROBE: NORMAL
SPECIMEN SOURCE: NORMAL

## 2021-01-11 PROCEDURE — 87635 SARS-COV-2 COVID-19 AMP PRB: CPT | Performed by: SURGERY

## 2021-01-12 ENCOUNTER — ANESTHESIA EVENT (OUTPATIENT)
Dept: SURGERY | Facility: CLINIC | Age: 31
End: 2021-01-12
Payer: MEDICARE

## 2021-01-12 LAB
LABORATORY COMMENT REPORT: NORMAL
SARS-COV-2 RNA RESP QL NAA+PROBE: NEGATIVE
SPECIMEN SOURCE: NORMAL

## 2021-01-13 ENCOUNTER — HOSPITAL ENCOUNTER (OUTPATIENT)
Facility: CLINIC | Age: 31
Discharge: HOME | End: 2021-01-14
Attending: SURGERY | Admitting: SURGERY
Payer: MEDICARE

## 2021-01-13 ENCOUNTER — APPOINTMENT (OUTPATIENT)
Dept: SURGERY | Facility: PHYSICIAN GROUP | Age: 31
End: 2021-01-13
Payer: MEDICARE

## 2021-01-13 ENCOUNTER — ANESTHESIA (OUTPATIENT)
Dept: SURGERY | Facility: CLINIC | Age: 31
End: 2021-01-13
Payer: MEDICARE

## 2021-01-13 ENCOUNTER — SURGERY (OUTPATIENT)
Age: 31
End: 2021-01-13
Payer: MEDICARE

## 2021-01-13 DIAGNOSIS — G89.18 POSTOPERATIVE PAIN: Primary | ICD-10-CM

## 2021-01-13 DIAGNOSIS — K80.20 SYMPTOMATIC CHOLELITHIASIS: ICD-10-CM

## 2021-01-13 LAB — HCG SERPL QL: NEGATIVE

## 2021-01-13 PROCEDURE — 88304 TISSUE EXAM BY PATHOLOGIST: CPT | Mod: 26 | Performed by: PATHOLOGY

## 2021-01-13 PROCEDURE — 36415 COLL VENOUS BLD VENIPUNCTURE: CPT | Performed by: SURGERY

## 2021-01-13 PROCEDURE — 250N000009 HC RX 250: Performed by: SURGERY

## 2021-01-13 PROCEDURE — 258N000003 HC RX IP 258 OP 636: Performed by: ANESTHESIOLOGY

## 2021-01-13 PROCEDURE — 250N000011 HC RX IP 250 OP 636: Performed by: ANESTHESIOLOGY

## 2021-01-13 PROCEDURE — 258N000003 HC RX IP 258 OP 636

## 2021-01-13 PROCEDURE — 370N000017 HC ANESTHESIA TECHNICAL FEE, PER MIN: Performed by: SURGERY

## 2021-01-13 PROCEDURE — 47562 LAPAROSCOPIC CHOLECYSTECTOMY: CPT | Mod: AS | Performed by: PHYSICIAN ASSISTANT

## 2021-01-13 PROCEDURE — 710N000009 HC RECOVERY PHASE 1, LEVEL 1, PER MIN: Performed by: SURGERY

## 2021-01-13 PROCEDURE — 250N000011 HC RX IP 250 OP 636

## 2021-01-13 PROCEDURE — 88304 TISSUE EXAM BY PATHOLOGIST: CPT | Mod: TC | Performed by: SURGERY

## 2021-01-13 PROCEDURE — 360N000076 HC SURGERY LEVEL 3, PER MIN: Performed by: SURGERY

## 2021-01-13 PROCEDURE — 250N000009 HC RX 250

## 2021-01-13 PROCEDURE — 272N000001 HC OR GENERAL SUPPLY STERILE: Performed by: SURGERY

## 2021-01-13 PROCEDURE — 250N000013 HC RX MED GY IP 250 OP 250 PS 637: Mod: GY | Performed by: PHYSICIAN ASSISTANT

## 2021-01-13 PROCEDURE — 84703 CHORIONIC GONADOTROPIN ASSAY: CPT | Performed by: SURGERY

## 2021-01-13 PROCEDURE — 999N000141 HC STATISTIC PRE-PROCEDURE NURSING ASSESSMENT: Performed by: SURGERY

## 2021-01-13 PROCEDURE — 258N000003 HC RX IP 258 OP 636: Performed by: PHYSICIAN ASSISTANT

## 2021-01-13 PROCEDURE — 47562 LAPAROSCOPIC CHOLECYSTECTOMY: CPT | Performed by: SURGERY

## 2021-01-13 PROCEDURE — 250N000025 HC SEVOFLURANE, PER MIN: Performed by: SURGERY

## 2021-01-13 RX ORDER — OXYCODONE AND ACETAMINOPHEN 5; 325 MG/1; MG/1
1-2 TABLET ORAL EVERY 4 HOURS PRN
Status: DISCONTINUED | OUTPATIENT
Start: 2021-01-13 | End: 2021-01-14 | Stop reason: HOSPADM

## 2021-01-13 RX ORDER — TRAZODONE HYDROCHLORIDE 50 MG/1
50 TABLET, FILM COATED ORAL AT BEDTIME
Status: DISCONTINUED | OUTPATIENT
Start: 2021-01-13 | End: 2021-01-14 | Stop reason: HOSPADM

## 2021-01-13 RX ORDER — ONDANSETRON 4 MG/1
4 TABLET, ORALLY DISINTEGRATING ORAL EVERY 30 MIN PRN
Status: DISCONTINUED | OUTPATIENT
Start: 2021-01-13 | End: 2021-01-13 | Stop reason: HOSPADM

## 2021-01-13 RX ORDER — LIDOCAINE 40 MG/G
CREAM TOPICAL
Status: DISCONTINUED | OUTPATIENT
Start: 2021-01-13 | End: 2021-01-13

## 2021-01-13 RX ORDER — PROPOFOL 10 MG/ML
INJECTION, EMULSION INTRAVENOUS PRN
Status: DISCONTINUED | OUTPATIENT
Start: 2021-01-13 | End: 2021-01-13

## 2021-01-13 RX ORDER — LIDOCAINE HYDROCHLORIDE 20 MG/ML
INJECTION, SOLUTION INFILTRATION; PERINEURAL PRN
Status: DISCONTINUED | OUTPATIENT
Start: 2021-01-13 | End: 2021-01-13

## 2021-01-13 RX ORDER — GLYCOPYRROLATE 0.2 MG/ML
INJECTION, SOLUTION INTRAMUSCULAR; INTRAVENOUS PRN
Status: DISCONTINUED | OUTPATIENT
Start: 2021-01-13 | End: 2021-01-13

## 2021-01-13 RX ORDER — MEPERIDINE HYDROCHLORIDE 25 MG/ML
12.5 INJECTION INTRAMUSCULAR; INTRAVENOUS; SUBCUTANEOUS
Status: DISCONTINUED | OUTPATIENT
Start: 2021-01-13 | End: 2021-01-13 | Stop reason: HOSPADM

## 2021-01-13 RX ORDER — CLONAZEPAM 0.5 MG/1
1 TABLET ORAL DAILY
Status: DISCONTINUED | OUTPATIENT
Start: 2021-01-13 | End: 2021-01-14 | Stop reason: HOSPADM

## 2021-01-13 RX ORDER — NALOXONE HYDROCHLORIDE 0.4 MG/ML
0.2 INJECTION, SOLUTION INTRAMUSCULAR; INTRAVENOUS; SUBCUTANEOUS
Status: DISCONTINUED | OUTPATIENT
Start: 2021-01-13 | End: 2021-01-13 | Stop reason: HOSPADM

## 2021-01-13 RX ORDER — EPHEDRINE SULFATE 50 MG/ML
INJECTION, SOLUTION INTRAMUSCULAR; INTRAVENOUS; SUBCUTANEOUS PRN
Status: DISCONTINUED | OUTPATIENT
Start: 2021-01-13 | End: 2021-01-13

## 2021-01-13 RX ORDER — FLUVOXAMINE MALEATE 100 MG
100 TABLET ORAL EVERY MORNING
Status: DISCONTINUED | OUTPATIENT
Start: 2021-01-14 | End: 2021-01-14 | Stop reason: HOSPADM

## 2021-01-13 RX ORDER — SODIUM CHLORIDE, SODIUM LACTATE, POTASSIUM CHLORIDE, CALCIUM CHLORIDE 600; 310; 30; 20 MG/100ML; MG/100ML; MG/100ML; MG/100ML
INJECTION, SOLUTION INTRAVENOUS CONTINUOUS
Status: DISCONTINUED | OUTPATIENT
Start: 2021-01-13 | End: 2021-01-14 | Stop reason: HOSPADM

## 2021-01-13 RX ORDER — SODIUM CHLORIDE, SODIUM LACTATE, POTASSIUM CHLORIDE, CALCIUM CHLORIDE 600; 310; 30; 20 MG/100ML; MG/100ML; MG/100ML; MG/100ML
INJECTION, SOLUTION INTRAVENOUS CONTINUOUS
Status: DISCONTINUED | OUTPATIENT
Start: 2021-01-13 | End: 2021-01-13 | Stop reason: HOSPADM

## 2021-01-13 RX ORDER — LIDOCAINE 40 MG/G
CREAM TOPICAL
Status: DISCONTINUED | OUTPATIENT
Start: 2021-01-13 | End: 2021-01-14 | Stop reason: HOSPADM

## 2021-01-13 RX ORDER — ONDANSETRON 2 MG/ML
4 INJECTION INTRAMUSCULAR; INTRAVENOUS EVERY 30 MIN PRN
Status: DISCONTINUED | OUTPATIENT
Start: 2021-01-13 | End: 2021-01-13 | Stop reason: HOSPADM

## 2021-01-13 RX ORDER — NEOSTIGMINE METHYLSULFATE 1 MG/ML
VIAL (ML) INJECTION PRN
Status: DISCONTINUED | OUTPATIENT
Start: 2021-01-13 | End: 2021-01-13

## 2021-01-13 RX ORDER — RISPERIDONE 2 MG/1
2 TABLET, ORALLY DISINTEGRATING ORAL EVERY MORNING
Status: DISCONTINUED | OUTPATIENT
Start: 2021-01-14 | End: 2021-01-14 | Stop reason: HOSPADM

## 2021-01-13 RX ORDER — NALOXONE HYDROCHLORIDE 0.4 MG/ML
0.4 INJECTION, SOLUTION INTRAMUSCULAR; INTRAVENOUS; SUBCUTANEOUS
Status: DISCONTINUED | OUTPATIENT
Start: 2021-01-13 | End: 2021-01-14 | Stop reason: HOSPADM

## 2021-01-13 RX ORDER — AMOXICILLIN 250 MG
1-2 CAPSULE ORAL 2 TIMES DAILY
Status: DISCONTINUED | OUTPATIENT
Start: 2021-01-13 | End: 2021-01-14 | Stop reason: HOSPADM

## 2021-01-13 RX ORDER — NALOXONE HYDROCHLORIDE 0.4 MG/ML
0.2 INJECTION, SOLUTION INTRAMUSCULAR; INTRAVENOUS; SUBCUTANEOUS
Status: DISCONTINUED | OUTPATIENT
Start: 2021-01-13 | End: 2021-01-14 | Stop reason: HOSPADM

## 2021-01-13 RX ORDER — FLUVOXAMINE MALEATE 100 MG
200 TABLET ORAL AT BEDTIME
Status: DISCONTINUED | OUTPATIENT
Start: 2021-01-13 | End: 2021-01-14 | Stop reason: HOSPADM

## 2021-01-13 RX ORDER — ONDANSETRON 2 MG/ML
4 INJECTION INTRAMUSCULAR; INTRAVENOUS EVERY 6 HOURS PRN
Status: DISCONTINUED | OUTPATIENT
Start: 2021-01-13 | End: 2021-01-14 | Stop reason: HOSPADM

## 2021-01-13 RX ORDER — HYDROMORPHONE HYDROCHLORIDE 1 MG/ML
.3-.5 INJECTION, SOLUTION INTRAMUSCULAR; INTRAVENOUS; SUBCUTANEOUS EVERY 5 MIN PRN
Status: DISCONTINUED | OUTPATIENT
Start: 2021-01-13 | End: 2021-01-13 | Stop reason: HOSPADM

## 2021-01-13 RX ORDER — SODIUM CHLORIDE, SODIUM LACTATE, POTASSIUM CHLORIDE, CALCIUM CHLORIDE 600; 310; 30; 20 MG/100ML; MG/100ML; MG/100ML; MG/100ML
INJECTION, SOLUTION INTRAVENOUS CONTINUOUS PRN
Status: DISCONTINUED | OUTPATIENT
Start: 2021-01-13 | End: 2021-01-13

## 2021-01-13 RX ORDER — ONDANSETRON 2 MG/ML
INJECTION INTRAMUSCULAR; INTRAVENOUS PRN
Status: DISCONTINUED | OUTPATIENT
Start: 2021-01-13 | End: 2021-01-13

## 2021-01-13 RX ORDER — RISPERIDONE 1 MG/1
3 TABLET, ORALLY DISINTEGRATING ORAL AT BEDTIME
Status: DISCONTINUED | OUTPATIENT
Start: 2021-01-14 | End: 2021-01-14 | Stop reason: HOSPADM

## 2021-01-13 RX ORDER — ONDANSETRON 4 MG/1
4 TABLET, ORALLY DISINTEGRATING ORAL EVERY 6 HOURS PRN
Status: DISCONTINUED | OUTPATIENT
Start: 2021-01-13 | End: 2021-01-14 | Stop reason: HOSPADM

## 2021-01-13 RX ORDER — OXYCODONE AND ACETAMINOPHEN 5; 325 MG/1; MG/1
1-2 TABLET ORAL EVERY 4 HOURS PRN
Qty: 12 TABLET | Refills: 0 | Status: SHIPPED | OUTPATIENT
Start: 2021-01-13 | End: 2021-01-01

## 2021-01-13 RX ORDER — HYDROMORPHONE HYDROCHLORIDE 1 MG/ML
.3-.5 INJECTION, SOLUTION INTRAMUSCULAR; INTRAVENOUS; SUBCUTANEOUS EVERY 10 MIN PRN
Status: DISCONTINUED | OUTPATIENT
Start: 2021-01-13 | End: 2021-01-13 | Stop reason: HOSPADM

## 2021-01-13 RX ORDER — CLINDAMYCIN PHOSPHATE 900 MG/50ML
900 INJECTION, SOLUTION INTRAVENOUS
Status: COMPLETED | OUTPATIENT
Start: 2021-01-13 | End: 2021-01-13

## 2021-01-13 RX ORDER — CLINDAMYCIN PHOSPHATE 900 MG/50ML
900 INJECTION, SOLUTION INTRAVENOUS SEE ADMIN INSTRUCTIONS
Status: DISCONTINUED | OUTPATIENT
Start: 2021-01-13 | End: 2021-01-13 | Stop reason: HOSPADM

## 2021-01-13 RX ORDER — DEXAMETHASONE SODIUM PHOSPHATE 4 MG/ML
INJECTION, SOLUTION INTRA-ARTICULAR; INTRALESIONAL; INTRAMUSCULAR; INTRAVENOUS; SOFT TISSUE PRN
Status: DISCONTINUED | OUTPATIENT
Start: 2021-01-13 | End: 2021-01-13

## 2021-01-13 RX ORDER — MAGNESIUM HYDROXIDE 1200 MG/15ML
LIQUID ORAL PRN
Status: DISCONTINUED | OUTPATIENT
Start: 2021-01-13 | End: 2021-01-13 | Stop reason: HOSPADM

## 2021-01-13 RX ORDER — NALOXONE HYDROCHLORIDE 0.4 MG/ML
0.4 INJECTION, SOLUTION INTRAMUSCULAR; INTRAVENOUS; SUBCUTANEOUS
Status: DISCONTINUED | OUTPATIENT
Start: 2021-01-13 | End: 2021-01-13 | Stop reason: HOSPADM

## 2021-01-13 RX ADMIN — OXYCODONE HYDROCHLORIDE AND ACETAMINOPHEN 1 TABLET: 5; 325 TABLET ORAL at 16:22

## 2021-01-13 RX ADMIN — PHENYLEPHRINE HYDROCHLORIDE 100 MCG: 10 INJECTION INTRAVENOUS at 13:00

## 2021-01-13 RX ADMIN — SODIUM CHLORIDE, POTASSIUM CHLORIDE, SODIUM LACTATE AND CALCIUM CHLORIDE: 600; 310; 30; 20 INJECTION, SOLUTION INTRAVENOUS at 14:38

## 2021-01-13 RX ADMIN — ONDANSETRON 4 MG: 2 INJECTION INTRAMUSCULAR; INTRAVENOUS at 13:16

## 2021-01-13 RX ADMIN — LIDOCAINE HYDROCHLORIDE 100 MG: 20 INJECTION, SOLUTION INFILTRATION; PERINEURAL at 12:28

## 2021-01-13 RX ADMIN — TRAZODONE HYDROCHLORIDE 50 MG: 50 TABLET ORAL at 22:19

## 2021-01-13 RX ADMIN — ROCURONIUM BROMIDE 50 MG: 10 INJECTION INTRAVENOUS at 12:28

## 2021-01-13 RX ADMIN — GLYCOPYRROLATE 0.2 MG: 0.2 INJECTION, SOLUTION INTRAMUSCULAR; INTRAVENOUS at 12:50

## 2021-01-13 RX ADMIN — NEOSTIGMINE METHYLSULFATE 5 MG: 1 INJECTION, SOLUTION INTRAVENOUS at 13:20

## 2021-01-13 RX ADMIN — OXYCODONE HYDROCHLORIDE AND ACETAMINOPHEN 1 TABLET: 5; 325 TABLET ORAL at 20:46

## 2021-01-13 RX ADMIN — SODIUM CHLORIDE 1000 ML: 900 IRRIGANT IRRIGATION at 12:46

## 2021-01-13 RX ADMIN — Medication 10 MG: at 12:50

## 2021-01-13 RX ADMIN — MIDAZOLAM 2 MG: 1 INJECTION INTRAMUSCULAR; INTRAVENOUS at 12:22

## 2021-01-13 RX ADMIN — SODIUM CHLORIDE, POTASSIUM CHLORIDE, SODIUM LACTATE AND CALCIUM CHLORIDE: 600; 310; 30; 20 INJECTION, SOLUTION INTRAVENOUS at 13:47

## 2021-01-13 RX ADMIN — FLUVOXAMINE MALEATE 200 MG: 100 TABLET ORAL at 22:19

## 2021-01-13 RX ADMIN — Medication 10 MG: at 12:53

## 2021-01-13 RX ADMIN — PHENYLEPHRINE HYDROCHLORIDE 200 MCG: 10 INJECTION INTRAVENOUS at 13:11

## 2021-01-13 RX ADMIN — LIDOCAINE HYDROCHLORIDE 50 ML: 10; .005 INJECTION, SOLUTION EPIDURAL; INFILTRATION; INTRACAUDAL; PERINEURAL at 13:18

## 2021-01-13 RX ADMIN — CLONAZEPAM 1 MG: 0.5 TABLET ORAL at 16:23

## 2021-01-13 RX ADMIN — ROCURONIUM BROMIDE 20 MG: 10 INJECTION INTRAVENOUS at 12:40

## 2021-01-13 RX ADMIN — HYDROMORPHONE HYDROCHLORIDE 0.5 MG: 1 INJECTION, SOLUTION INTRAMUSCULAR; INTRAVENOUS; SUBCUTANEOUS at 14:01

## 2021-01-13 RX ADMIN — HYDROMORPHONE HYDROCHLORIDE 0.5 MG: 1 INJECTION, SOLUTION INTRAMUSCULAR; INTRAVENOUS; SUBCUTANEOUS at 12:28

## 2021-01-13 RX ADMIN — DEXAMETHASONE SODIUM PHOSPHATE 4 MG: 4 INJECTION, SOLUTION INTRA-ARTICULAR; INTRALESIONAL; INTRAMUSCULAR; INTRAVENOUS; SOFT TISSUE at 12:41

## 2021-01-13 RX ADMIN — SODIUM CHLORIDE, POTASSIUM CHLORIDE, SODIUM LACTATE AND CALCIUM CHLORIDE: 600; 310; 30; 20 INJECTION, SOLUTION INTRAVENOUS at 16:23

## 2021-01-13 RX ADMIN — GLYCOPYRROLATE 0.8 MG: 0.2 INJECTION, SOLUTION INTRAMUSCULAR; INTRAVENOUS at 13:20

## 2021-01-13 RX ADMIN — CLINDAMYCIN PHOSPHATE 900 MG: 900 INJECTION, SOLUTION INTRAVENOUS at 12:37

## 2021-01-13 RX ADMIN — SODIUM CHLORIDE, POTASSIUM CHLORIDE, SODIUM LACTATE AND CALCIUM CHLORIDE: 600; 310; 30; 20 INJECTION, SOLUTION INTRAVENOUS at 12:22

## 2021-01-13 RX ADMIN — PROPOFOL 200 MG: 10 INJECTION, EMULSION INTRAVENOUS at 12:28

## 2021-01-13 RX ADMIN — PHENYLEPHRINE HYDROCHLORIDE 200 MCG: 10 INJECTION INTRAVENOUS at 13:04

## 2021-01-13 ASSESSMENT — LIFESTYLE VARIABLES: TOBACCO_USE: 0

## 2021-01-13 ASSESSMENT — COPD QUESTIONNAIRES: COPD: 0

## 2021-01-13 ASSESSMENT — MIFFLIN-ST. JEOR: SCORE: 1662.53

## 2021-01-13 ASSESSMENT — ENCOUNTER SYMPTOMS: DYSRHYTHMIAS: 0

## 2021-01-13 NOTE — DISCHARGE INSTRUCTIONS
Same Day Surgery Discharge Instructions for  Sedation and General Anesthesia       It's not unusual to feel dizzy, light-headed or faint for up to 24 hours after surgery or while taking pain medication.  If you have these symptoms: sit for a few minutes before standing and have someone assist you when you get up to walk or use the bathroom.      You should rest and relax for the next 24 hours. We recommend you make arrangements to have an adult stay with you for at least 24 hours after your discharge.  Avoid hazardous and strenuous activity.      DO NOT DRIVE any vehicle or operate mechanical equipment for 24 hours following the end of your surgery.  Even though you may feel normal, your reactions may be affected by the medication you have received.      Do not drink alcoholic beverages for 24 hours following surgery.       Slowly progress to your regular diet as you feel able. It's not unusual to feel nauseated and/or vomit after receiving anesthesia.  If you develop these symptoms, drink clear liquids (apple juice, ginger ale, broth, 7-up, etc. ) until you feel better.  If your nausea and vomiting persists for 24 hours, please notify your surgeon.        All narcotic pain medications, along with inactivity and anesthesia, can cause constipation. Drinking plenty of liquids and increasing fiber intake will help.      For any questions of a medical nature, call your surgeon.      Do not make important decisions for 24 hours.      If you had general anesthesia, you may have a sore throat for a couple of days related to the breathing tube used during surgery.  You may use Cepacol lozenges to help with this discomfort.  If it worsens or if you develop a fever, contact your surgeon.       If you feel your pain is not well managed with the pain medications prescribed by your surgeon, please contact your surgeon's office to let them know so they can address your concerns.       CoVid 19 Information    We want to give you  information regarding Covid. Please consult your primary care provider with any questions you might have.     Patient who have symptoms (cough, fever, or shortness of breath), need to isolate for 7 days from when symptoms started OR 72 hours after fever resolves (without fever reducing medications) AND improvement of respiratory symptoms (whichever is longer).      Isolate yourself at home (in own room/own bathroom if possible)    Do Not allow any visitors    Do Not go to work or school    Do Not go to Denominational,  centers, shopping, or other public places.    Do Not shake hands.    Avoid close and intimate contact with others (hugging, kissing).    Follow CDC recommendations for household cleaning of frequently touched services.     After the initial 7 days, continue to isolate yourself from household members as much as possible. To continue decrease the risk of community spread and exposure, you and any members of your household should limit activities in public for 14 days after starting home isolation.     You can reference the following CDC link for helpful home isolation/care tips:  https://www.cdc.gov/coronavirus/2019-ncov/downloads/10Things.pdf    Protect Others:    Cover Your Mouth and Nose with a mask, disposable tissue or wash cloth to avoid spreading germs to others.    Wash your hands and face frequently with soap and water    Call Your Primary Doctor If: Breathing difficulty develops or you become worse.    For more information about COVID19 and options for caring for yourself at home, please visit the CDC website at https://www.cdc.gov/coronavirus/2019-ncov/about/steps-when-sick.html  For more options for care at [facility], please visit our website at [email or web address]        [facility]  Discharge Instructions: Post-Operative Laparoscopic Cholecystectomy    ACTIVITY    Expect to feel tired after your surgery.  This will  gradually resolve.      Take frequent, short walks and increase your activity gradually.      Avoid strenuous physical activity or heavy lifting greater than 15-20 lbs. for 2-3 weeks.  You may climb stairs.    You may drive without restrictions when you are not using any prescription pain medication and feel comfortable in a car.    You may return to work/school when you are comfortable without any prescription pain medication.    WOUND CARE    You may remove your outer dressing or Band-Aids and shower 48 hours after the surgery.  Pat your incisions dry and leave them open to air.  Re-apply dressing (Band-Aids or gauze/tape) as needed for comfort or drainage.    You may have steri-strips (looks like white tape) on your incision.  You may peel off the steri-strips 2 weeks after your surgery if they have not peeled off on their own.     Do not soak your incisions in a tub or pool for 2 weeks.     Do not apply any lotions, creams, or ointments to your incisions.    A ridge under your incisions is normal and will gradually resolve.    DIET    Start with liquids, then gradually resume your regular diet as tolerated.  Avoid heavy, spicy, and greasy meals for 2-3 days.    Drink plenty of fluids to stay hydrated.    It is not uncommon to experience some loose stools or diarrhea after surgery.  This is your body's way of adapting to the bile which will slowly drain into your intestine.  A low fat diet may help with this.  This should improve over 1-2 months.    PAIN    Expect some tenderness and discomfort at the incision sites.  Use the prescribed pain medication at your discretion.  Expect gradual resolution of your pain over several days.    You may take ibuprofen with food (unless you have been told not to) instead of or in addition to your prescribed pain medication.  If you are taking Percocet, do not take any additional acetaminophen/Tylenol.    Do not drink alcohol or drive while you are taking pain  medications.    You may apply ice to your incisions in 20 minute intervals as needed for the next 48 hours.  After that time, consider switching to heat if you prefer.    EXPECTATIONS    Pain medications can cause constipation.  Limit use when possible.  Take over the counter stool softener/stimulant, such as Colace or Senna, 1-2 times a day with plenty of water.  You may take a mild over the counter laxative, such as Miralax or a suppository, as needed.  You may discontinue these medications once you are having regular bowel movements and/or are no longer taking your narcotic pain medication.      You may have shoulder or upper back discomfort due to the gas used in surgery.  This is temporary and should resolve in 48-72 hours.  Short, frequent walks may help with this.    If you are unable to urinate, or feel as though you are not emptying your bladder adequately, we recommend you call our office and/or seek care at an ER or Urgent Care facility if after hours.    FOLLOW UP    Our office will contact you in approximately 2 weeks to check on your progress and answer any questions you may have.  If you are doing well, you will not need to return for a follow up appointment.  If any concerns are identified over the phone, we will help you make an appointment to see a provider.     If you have not received a phone call, have any questions or concerns, or would like to be seen, please call us at [phone] and ask to speak with our nurse.  We are located at [address] [place] [address].    CALL OUR OFFICE -[phone] IF YOU HAVE:     Chills or fever above 101 F.    Increased redness, warmth, or drainage at your incisions.    Significant bleeding.    Pain not relieved by your pain medication or rest.    Increasing pain after the first 48 hours.    Any other concerns or questions.                  Revised September 2020

## 2021-01-13 NOTE — OP NOTE
General Surgery Operative Note    PREOPERATIVE DIAGNOSIS:  Symptomatic cholelithiasis.    POSTOPERATIVE DIAGNOSIS:  Cholecystitis.    PROCEDURE:  Laparoscopic Cholecystectomy    SURGEON:  Frank Christy M.D.    ASSISTANT:  Sofía Carballo PA-C    ANESTHESIA:  General.    BLOOD LOSS: 5 cc    FINDINGS: Cholecystitis    INDICATIONS:   Mrs. Caballero presented with cholecystitis and is now presenting for laparoscopic cholecystectomy. I explained the risks, benefits, complications including but not limited to bleeding, infection, possible need to open, possible postop hematoma, seroma, bowel, bladder or bile duct injury, MI, PE, and if any of these occurred the patient would require additional procedures. The patient agreed and did sign consent.    DETAILS OF PROCEDURE: The patient was brought to the operating room per Anesthesia, placed in supine position, and intubated without difficulty. A Surgical timeout was then performed, verifying the correct surgeon, site, procedure, and patient and all in the room were in agreement. 1% lidocaine and 0.25% were injected into infraumbilical area. A skin incision was made and was carried down to the anterior fascia. The fascia was grasped with 2 Kocher's and sharply incised. An open Orlando technique was used to get intra-abdominal cavity. The camera was inserted and revealed no trocar injuries. Local was injected to the upper abdomen and 2  5's were placed in the subxiphoid and right upper quadrant under direct visualization. The gallbladder was retracted superiorly and laterally. The gallbladder was moderatly inflamed. Cautery was used to take down the peritoneal attachments. I was able to delineate the artery and duct and got under the undersurface of the gallbladder to help declinate the duct and artery further. This was taken up high to confirm the critical view on multiple views. Once I was comfortable that there was 1 duct and 1 artery going straight into the gallbladder, I  clipped the duct 2 times proximally and 1 distally. This was cut with scissors. The artery was done in a similar manner. There was a very small 1-2 mm posterior vein that was clipped and cut as well. The gallbladder was then taken off the liver bed with cautery. There was no bile spillage or significant bleeding. The gallbladder was then placed in an Endo catch bag and removed through the umbilical trocar site. The camera was reinserted which showed no bleeding or bile spillage. Copious irrigation was used until clear. The wound bed was inspected multiple times showing that it was completely dry and that the clips were in place. The omentum was packed into the perihepatic fossa. All gas was expelled and the trocars were taken out under direct visualization. The fascia closed with 0 Vicryl in figure-of-eight fashion. Marcaine 0.25% were injected to all the wounds. The skin was closed with a 4-0 Monocryl in a subcuticular fashion. Steri-strips and sterile dressings were applied. The patient tolerated the procedure well. There were no complications. They were awoken from anesthesia and transferred to PACU in stable condition. All sponge, instrument and needle counts were correct. The physician assistant was medically necessary to assist in prepping, positioning, camera operation, retraction/exposure, and closure of the port sites.     SAJI TUTTLE MD     Please route or send letter to:  Primary Care Provider (PCP) and Referring Provider

## 2021-01-13 NOTE — PHARMACY-ADMISSION MEDICATION HISTORY
Pharmacy Medication History  Admission medication history interview status for the 1/13/2021  admission is complete. See EPIC admission navigator for prior to admission medications     Location of Interview: Phone  Medication history sources: Patient, Surescripts, Care Everywhere  Medication history source reliability: Good  Adherence assessment: Good    Significant changes made to the medication list:  Fluvoxamine is 100 mg in the morning and 200 mg at bedtime.    In the past week, patient estimated taking medication this percent of the time: greater than 90%      Medication reconciliation completed by provider prior to medication history? Yes    Time spent in this activity: 20 minutes      Prior to Admission medications    Medication Sig Last Dose Taking? Auth Provider   clonazePAM (KLONOPIN) 1 MG tablet Take one tablet by mouth daily and may take one additional tablet daily as needed.  Hold after 9 am on the days prior to ECT 1/13/2021 at am Yes Reported, Patient   ferrous sulfate (FEROSUL) 325 (65 Fe) MG tablet Take 325 mg by mouth daily (with breakfast)  1/13/2021 at am Yes Reported, Patient   fluvoxaMINE (LUVOX) 100 MG tablet Take 1 tab by mouth daily and take 2 tablets by mouth at bedtime 1/13/2021 at am Yes Reported, Patient   melatonin 3 MG tablet Take 6 mg by mouth At Bedtime  1/12/2021 at Unknown time Yes Reported, Patient   omeprazole (PRILOSEC) 40 MG DR capsule Take 1 capsule (40 mg) by mouth daily 1/13/2021 at Unknown time Yes Tang Hollis PA-C   ondansetron (ZOFRAN-ODT) 8 MG ODT tab Take 1 tablet (8 mg) by mouth every 8 hours as needed for nausea Past Month at Unknown time Yes Tang Hollis PA-C   oxyCODONE-acetaminophen (PERCOCET) 5-325 MG tablet Take 1-2 tablets by mouth every 4 hours as needed for moderate to severe pain  Yes Sofía Carballo PA-C   risperiDONE (RISPERDAL M-TABS) 2 MG ODT Place 2 mg under the tongue every morning  1/13/2021 at Unknown time Yes Reported, Patient    risperiDONE (RISPERDAL M-TABS) 3 MG ODT Place 3 mg under the tongue At Bedtime  1/12/2021 at Unknown time Yes Reported, Patient   traZODone (DESYREL) 50 MG tablet Take 50 mg by mouth At Bedtime May take an extra 50 mg tablet as needed for sleep 1/12/2021 at Unknown time Yes Reported, Patient

## 2021-01-13 NOTE — ANESTHESIA POSTPROCEDURE EVALUATION
Patient: Cristine Caballero    Procedure(s):  LAPAROSCOPIC CHOLECYSTECTOMY    Diagnosis:Symptomatic cholelithiasis [K80.20]  Diagnosis Additional Information: No value filed.    Anesthesia Type:  General    Note:  Anesthesia Post Evaluation    Patient location during evaluation: PACU  Patient participation: Able to fully participate in evaluation  Level of consciousness: awake and alert  Pain management: adequate  Airway patency: patent  Cardiovascular status: acceptable  Respiratory status: acceptable  Hydration status: acceptable  PONV: none     Anesthetic complications: None          Last vitals:  Vitals:    01/13/21 1530 01/13/21 1538 01/13/21 1600   BP: 101/63  118/66   Pulse: 105  99   Resp: 13     Temp: 36.5  C (97.7  F)  35.1  C (95.2  F)   SpO2: 92% 92% 94%         Electronically Signed By: Benjy Guerra MD  January 13, 2021  4:27 PM

## 2021-01-13 NOTE — OR NURSING
Pt reports that she lives at [facility] in [place] for inpatient treatment. Pt has a friend to give her a ride home but no one can stay at the facility with her to monitor for 24 hours post surgery. Nursing staff checks on pt q6hrs. Pt open to overnight stay at the hospital. Dr Christy informed of situation.

## 2021-01-13 NOTE — ANESTHESIA PREPROCEDURE EVALUATION
Anesthesia Pre-Procedure Evaluation    Patient: Cristine Caballero   MRN: [id number] : 1990          Preoperative Diagnosis: Symptomatic cholelithiasis [K80.20]    Procedure(s):  LAPAROSCOPIC CHOLECYSTECTOMY    Past Medical History:   Diagnosis Date     Anxiety disorder      Bipolar disorder (H)     firs manic episode summer 2020     Depressive disorder      Bernadine-Danlos syndrome      Environmental allergies      Gastroesophageal reflux disease      Hx of eating disorder      Hypertension     elevated since propanolol discontinued     IBS (irritable bowel syndrome)      OCD (obsessive compulsive disorder)     stable with CBT      Small intestinal bacterial overgrowth     pt reported.      Suicide attempt by multiple drug overdose (H) 2016     Vitamin D deficiency      Past Surgical History:   Procedure Laterality Date     ABDOMEN SURGERY  2012    Exploratory Laparoscopy     biopsy  2010    vulvar     BIOPSY      GI, vulva- OK     COLONOSCOPY       COLONOSCOPY N/A 5/15/2019    Procedure: Colonoscopy, With Polypectomy And Biopsy;  Surgeon: Perico Meek MD;  Location: [facility] OR     COLONOSCOPY WITH CO2 INSUFFLATION N/A 5/15/2019    Procedure: COLONOSCOPY, WITH CO2 INSUFFLATION;  Surgeon: Perico Meek MD;  Location: [facility] OR     COMBINED ESOPHAGOSCOPY, GASTROSCOPY, DUODENOSCOPY (EGD) WITH CO2 INSUFFLATION N/A 5/15/2019    Procedure: ESOPHAGOGASTRODUODENOSCOPY, WITH CO2 INSUFFLATION;  Surgeon: Perico Meek MD;  Location:  OR     DENTAL SURGERY       ENT SURGERY      intubation & NG tube     ESOPHAGOSCOPY, GASTROSCOPY, DUODENOSCOPY (EGD), COMBINED  10/11/2011    Procedure:COMBINED ESOPHAGOSCOPY, GASTROSCOPY, DUODENOSCOPY (EGD), BIOPSY SINGLE OR MULTIPLE; Surgeon:TALA MONTEMAYOR; Location:[facility]     ESOPHAGOSCOPY, GASTROSCOPY, DUODENOSCOPY (EGD), COMBINED N/A 5/15/2019    Procedure: Esophagogastroduodenoscopy, With Biopsy;  Surgeon: Perico Meek MD;  Location:  OR      BREATH  HYDROGEN TEST  10/4/2011    Procedure:HYDROGEN BREATH TEST; Surgeon:RADHA EPSTEIN; Location:[facility]     LAPAROSCOPY       ORTHOPEDIC SURGERY      left wrist- screw in left scaphiod     TONSILLECTOMY       WRIST SURGERY  2004    screw placed L wrist after fracture       Anesthesia Evaluation     . Pt has had prior anesthetic. Type: General           ROS/MED HX    ENT/Pulmonary:      (-) tobacco use, asthma, COPD and sleep apnea   Neurologic:  - neg neurologic ROS     Cardiovascular:        (-) hypertension, CAD, arrhythmias and dyslipidemia   METS/Exercise Tolerance:     Hematologic:  - neg hematologic  ROS       Musculoskeletal: Comment: ehler's danlos syndrome         GI/Hepatic: Comment: Eating disorder  IBS    (+) GERD cholecystitis/cholelithiasis, Other GI/Hepatic IBS     (-) liver disease   Renal/Genitourinary:      (-) renal disease   Endo:     (+) Obesity, .   (-) Type I DM and Type II DM   Psychiatric: Comment: Suicide attempt in past  OCD    (+) psychiatric history anxiety, bipolar and depression      Infectious Disease:         Malignancy:         Other:                          Physical Exam  Normal systems: dental    Airway   Mallampati: III  TM distance: >3 FB  Neck ROM: full    Dental   Comment: The patient denies any loose, chipped, or missing teeth.    Cardiovascular   Rhythm and rate: regular and normal  (-) no murmur    Pulmonary    breath sounds clear to auscultation            Lab Results   Component Value Date    WBC 5.9 12/25/2020    HGB 14.3 12/25/2020    HCT 42.5 12/25/2020     12/25/2020    CRP <2.9 10/26/2019    SED 5 10/26/2019     12/25/2020    POTASSIUM 4.0 12/25/2020    CHLORIDE 106 12/25/2020    CO2 27 12/25/2020    BUN 22 12/25/2020    CR 0.73 12/25/2020     (H) 12/25/2020    MODESTO 8.8 12/25/2020    ALBUMIN 4.0 12/25/2020    PROTTOTAL 7.3 12/25/2020    ALT 20 12/25/2020    AST 11 12/25/2020    GGT 11 01/12/2009    ALKPHOS 58 12/25/2020    BILITOTAL 0.2 12/25/2020     "LIPASE 89 12/25/2020    AMYLASE 36 12/25/2020    TSH 1.93 04/05/2019    T4 1.23 11/27/2018    HCG Negative 04/14/2020    HCGS Negative 12/25/2020       Preop Vitals  BP Readings from Last 3 Encounters:   12/25/20 115/81   12/25/20 114/68   05/16/20 136/84    Pulse Readings from Last 3 Encounters:   12/25/20 64   12/25/20 76   05/16/20 85      Resp Readings from Last 3 Encounters:   12/25/20 16   05/16/20 16   04/14/20 18    SpO2 Readings from Last 3 Encounters:   12/25/20 96%   12/25/20 97%   05/16/20 100%      Temp Readings from Last 1 Encounters:   12/25/20 36.6  C (97.8  F) (Oral)    Ht Readings from Last 1 Encounters:   12/25/20 1.651 m (5' 5\")      Wt Readings from Last 1 Encounters:   12/25/20 91.6 kg (202 lb)    Estimated body mass index is 33.61 kg/m  as calculated from the following:    Height as of 12/25/20: 1.651 m (5' 5\").    Weight as of 12/25/20: 91.6 kg (202 lb).       Anesthesia Plan      History & Physical Review  History and physical reviewed and following examination; no interval change.    ASA Status:  2 .    NPO Status:  > 8 hours    Plan for General with Intravenous induction. Maintenance will be Balanced.    PONV prophylaxis:  Ondansetron (or other 5HT-3) and Dexamethasone or Solumedrol  Additional equipment: Videolaryngoscope        Postoperative Care  Postoperative pain management:  IV analgesics and Multi-modal analgesia.  Plan for postoperative opioid use.    Consents  Anesthetic plan, risks, benefits and alternatives discussed with:  Patient..                 Jacob Villagomez MD  "

## 2021-01-13 NOTE — ANESTHESIA CARE TRANSFER NOTE
Patient: Cristine Caballero    Procedure(s):  LAPAROSCOPIC CHOLECYSTECTOMY    Diagnosis: Symptomatic cholelithiasis [K80.20]  Diagnosis Additional Information: No value filed.    Anesthesia Type:   General     Note:  Airway :Face Mask  Patient transferred to:PACU  Comments: Neuromuscular blockade reversed after TOF 4/4, spontaneous respirations, adequate tidal volumes, followed commands to voice, oropharynx suctioned with soft flexible catheter, extubated atraumatically, extubated with suction, airway patent after extubation.  Oxygen via facemask at 8 liters per minute to PACU. Oxygen tubing connected to wall O2 in PACU, SpO2, NiBP, and EKG monitors and alarms on and functioning, George Hugger warmer connected to patient gown, report on patient's clinical status given to PACU RN, RN questions answered.   Handoff Report: Identifed the Patient, Identified the Reponsible Provider, Reviewed the pertinent medical history, Discussed the surgical course, Reviewed Intra-OP anesthesia mangement and issues during anesthesia, Set expectations for post-procedure period and Allowed opportunity for questions and acknowledgement of understanding      Vitals: (Last set prior to Anesthesia Care Transfer)    CRNA VITALS  1/13/2021 1312 - 1/13/2021 1347      1/13/2021             Resp Rate (set):  10                Electronically Signed By: LAURIE Allen CRNA  January 13, 2021  1:47 PM

## 2021-01-14 VITALS
BODY MASS INDEX: 32.98 KG/M2 | OXYGEN SATURATION: 92 % | RESPIRATION RATE: 17 BRPM | WEIGHT: 205.2 LBS | TEMPERATURE: 97.1 F | HEIGHT: 66 IN | SYSTOLIC BLOOD PRESSURE: 116 MMHG | HEART RATE: 93 BPM | DIASTOLIC BLOOD PRESSURE: 73 MMHG

## 2021-01-14 LAB
COPATH REPORT: NORMAL
GLUCOSE BLDC GLUCOMTR-MCNC: 119 MG/DL (ref 70–99)

## 2021-01-14 PROCEDURE — 999N001017 HC STATISTIC GLUCOSE BY METER IP

## 2021-01-14 PROCEDURE — 250N000013 HC RX MED GY IP 250 OP 250 PS 637: Mod: GY | Performed by: PHYSICIAN ASSISTANT

## 2021-01-14 PROCEDURE — 250N000013 HC RX MED GY IP 250 OP 250 PS 637: Performed by: SURGERY

## 2021-01-14 RX ADMIN — OMEPRAZOLE 40 MG: 20 CAPSULE, DELAYED RELEASE ORAL at 07:54

## 2021-01-14 RX ADMIN — CLONAZEPAM 1 MG: 0.5 TABLET ORAL at 07:54

## 2021-01-14 RX ADMIN — FLUVOXAMINE MALEATE 100 MG: 100 TABLET ORAL at 07:54

## 2021-01-14 RX ADMIN — RISPERIDONE 2 MG: 2 TABLET, ORALLY DISINTEGRATING ORAL at 07:54

## 2021-01-14 RX ADMIN — DOCUSATE SODIUM 50 MG AND SENNOSIDES 8.6 MG 1 TABLET: 8.6; 5 TABLET, FILM COATED ORAL at 07:54

## 2021-01-14 RX ADMIN — OXYCODONE HYDROCHLORIDE AND ACETAMINOPHEN 1 TABLET: 5; 325 TABLET ORAL at 05:47

## 2021-01-14 NOTE — PLAN OF CARE
Pt A/Ox4, VSS on 1L oxymizer. Up SBA , voiding adequately. Regular diet. BG @0600 . PRN PERCOCET 1 tablet given for abdominal pain @0547. Denied SOB/nausea. PIV SL. Possible discharge today. Continue to monitor.

## 2021-01-14 NOTE — PROGRESS NOTES
"General Surgery Progress Note    Admission Date: 1/13/2021  Today's Date: 1/14/2021         Assessment:      Cristine Caballero is a 30 year old female POD 1 s/p laparoscopic cholecystectomy         Plan:   - Discharge home today. Instructions reviewed with patient, questions answered. Follow-up via phone in ~2 weeks  - Percocet prn pain  - Bowel regimen as needed  - Regular diet        Interval History:   Afebrile overnight, VSS, O2 stable on room air. Feeling well, tolerating diet, no nausea. Has been up walking some, feels steady.          Physical Exam:   /73 (BP Location: Right arm)   Pulse 93   Temp 97.1  F (36.2  C) (Oral)   Resp 17   Ht 1.676 m (5' 6\")   Wt 93.1 kg (205 lb 3.2 oz)   SpO2 92%   BMI 33.12 kg/m    I/O last 3 completed shifts:  In: 1500 [I.V.:1500]  Out: 600 [Urine:600]  General: NAD, pleasant, alert and oriented x3  Respiratory: non-labored breathing  Abdomen: soft, appropriately tender around incisions, non distended  Incisions: clean, dry, and intact with steris and bandaids. No erythema or drainage. Slight bruising around umbilical incision  Extremities: no lower extremity edema, no calf tenderness    -------------------------------    Sofía Carballo PA-C  [facility]  [phone]      "

## 2021-01-14 NOTE — PLAN OF CARE
Pt A/Ox4, VSS RA. SBA to BR, voiding adequately. Tolerating full liquid diet. Lap sites x3 abd.  Pain controlled w/ PRN percocet. Plan to discharge to prior living arrangement tomorrow.

## 2021-01-14 NOTE — PROGRESS NOTES
Patient discharged back to her facility on 1/14/21. Discharge instruction given to patient. Verbalizes understanding of diet, activities, incision care, follow up appointments, pain management. No further questions at the moment. pain medication packet handed to patient. CC consulted for discharge planning. Patient's ride will be here around 10.30 am. Will transfer once her ride is down in door #2.

## 2021-01-14 NOTE — CONSULTS
Care Management Initial Consult    General Information  Assessment completed with: Patient, Other, Pt and Jagruti RN at Treatment center  Type of CM/SW Visit: Offer D/C Planning    Primary Care Provider verified and updated as needed: Yes   Readmission within the last 30 days:        Reason for Consult: care coordination/care conference  Advance Care Planning:            Communication Assessment  Patient's communication style: spoken language (English or Bilingual)             Cognitive  Cognitive/Neuro/Behavioral: WDL                      Living Environment:   People in home: other (see comments)     Current living Arrangements: residential facility  Name of Facility: [facility]   Able to return to prior arrangements: yes       Family/Social Support:  Care provided by: self  Provides care for:    Marital Status: Single             Description of Support System:           Current Resources:   Skilled Home Care Services:    Community Resources:    Equipment currently used at home: none  Supplies currently used at home:      Employment/Financial:  Employment Status:          Financial Concerns: No concerns identified           Lifestyle & Psychosocial Needs:        Socioeconomic History     Marital status: Single     Spouse name: Not on file     Number of children: Not on file     Years of education: Not on file     Highest education level: Not on file   Occupational History     Employer: UNEMPLOYED     Tobacco Use     Smoking status: Never Smoker     Smokeless tobacco: Never Used   Substance and Sexual Activity     Alcohol use: Not Currently     Alcohol/week: 3.0 - 6.0 standard drinks     Types: 1 - 2 Glasses of wine, 1 - 2 Cans of beer, 1 - 2 Shots of liquor per week     Frequency: Monthly or less     Drinks per session: 1 or 2     Binge frequency: Monthly     Comment: 1-2 EtOH drinks 2 days per week     Drug use: No     Sexual activity: Yes     Partners: Male     Birth control/protection: Condom        Functional Status:  Prior to admission patient needed assistance:              Mental Health Status:  Mental Health Status: Past Concern  Mental Health Management: Psychiatrist, Other (see comment)(ECT)    Chemical Dependency Status:  Chemical Dependency Status: No Current Concerns             Values/Beliefs:  Spiritual, Cultural Beliefs, Scientology Practices, Values that affect care: no               Additional Information:  Met with patient to discuss role in discharge planning. Pt lives at [facility] in [place].  Plans to return there.  RN on site during day.  They administer meds.  Pt is indep in cares.  Uses [employer] or friends to get to appointments.  Currently undergoing ECT.   Care Management Discharge Note    Discharge Date:         Discharge Disposition: Home    Discharge Services: None    Discharge DME: None    Discharge Transportation: family or friend will provide    Private pay costs discussed: Not applicable    PAS Confirmation Code:    Patient/family educated on Medicare website which has current facility and service quality ratings: (NA)    Education Provided on the Discharge Plan:    Persons Notified of Discharge Plans: Pt/SARA Chand at facility  Patient/Family in Agreement with the Plan: yes    Handoff Referral Completed: No    Additional Information:  Pt wanted CC to speak with [facility] regarding return there, food guidelines, dressing changes, etc.    Spoke with SARA Chand ( [phone]) who voices Pt can return there.  Reviewed Pt's concern regarding food and site care and Jagruti voiced understanding.  Jagruti aware Pt will be returning with medication (pain meds)  Orders Faxed to [phone]  Pt voices she has a friend picking her up at 10:30  Bedside to review discharge information which includes information on Pt's questions.   Surgery to call and  Follow-up her.  Pt voices no other needs for discharge.   Bedside/Charge updated.         Mandi Mendez,  RN

## 2021-01-14 NOTE — PROGRESS NOTES
General Surgery - Chart Check Note    Afebrile overnight, VSS. Patient doing well per RN notes, tolerating diet, no nausea.    Wean O2, encourage IS use, continue regular diet, continue ambulate. OK for discharge if following criteria are met:    Stable vital signs, no oxygen requirement    Oral temperature < 100 o F    Return to baseline mental status    No bleeding at incision site    Able to tolerate oral fluids    Minimal pain reported and/or controlled with oral analgesics    Minimal nausea    Ambulates with assistance appropriate to age and health status       Patient does not need to wait to see myself or Dr. Christy prior to discharge. I will be available to see her later this morning if she is still in hospital.      Sofía Carballo PA-C  [facility]  [phone]

## 2021-01-14 NOTE — PROVIDER NOTIFICATION
MD Notification    Notified Person: MD    Notified Person Name: BENJA DALLAS    Notification Date/Time: 9.22 am    Notification Interaction: paged    Purpose of Notification: Need discharge instruction    Orders Received:    Comments:

## 2021-01-18 ENCOUNTER — OFFICE VISIT (OUTPATIENT)
Dept: URGENT CARE | Facility: URGENT CARE | Age: 31
End: 2021-01-18
Payer: MEDICARE

## 2021-01-18 ENCOUNTER — APPOINTMENT (OUTPATIENT)
Dept: GENERAL RADIOLOGY | Facility: CLINIC | Age: 31
End: 2021-01-18
Attending: EMERGENCY MEDICINE
Payer: MEDICARE

## 2021-01-18 ENCOUNTER — HOSPITAL ENCOUNTER (EMERGENCY)
Facility: CLINIC | Age: 31
Discharge: HOME | End: 2021-01-18
Attending: EMERGENCY MEDICINE | Admitting: EMERGENCY MEDICINE
Payer: MEDICARE

## 2021-01-18 VITALS
HEART RATE: 88 BPM | WEIGHT: 204.6 LBS | RESPIRATION RATE: 16 BRPM | DIASTOLIC BLOOD PRESSURE: 80 MMHG | BODY MASS INDEX: 33.02 KG/M2 | SYSTOLIC BLOOD PRESSURE: 111 MMHG | OXYGEN SATURATION: 98 %

## 2021-01-18 VITALS
BODY MASS INDEX: 33.82 KG/M2 | DIASTOLIC BLOOD PRESSURE: 89 MMHG | TEMPERATURE: 97.1 F | HEART RATE: 98 BPM | RESPIRATION RATE: 18 BRPM | HEIGHT: 65 IN | SYSTOLIC BLOOD PRESSURE: 132 MMHG | WEIGHT: 203 LBS | OXYGEN SATURATION: 96 %

## 2021-01-18 DIAGNOSIS — K59.00 CONSTIPATION, UNSPECIFIED CONSTIPATION TYPE: ICD-10-CM

## 2021-01-18 DIAGNOSIS — R10.84 ABDOMINAL PAIN, GENERALIZED: Primary | ICD-10-CM

## 2021-01-18 DIAGNOSIS — N30.00 ACUTE CYSTITIS WITHOUT HEMATURIA: ICD-10-CM

## 2021-01-18 DIAGNOSIS — R33.9 URINARY RETENTION: ICD-10-CM

## 2021-01-18 PROCEDURE — 99283 EMERGENCY DEPT VISIT LOW MDM: CPT

## 2021-01-18 PROCEDURE — 74018 RADEX ABDOMEN 1 VIEW: CPT

## 2021-01-18 PROCEDURE — 250N000013 HC RX MED GY IP 250 OP 250 PS 637: Performed by: EMERGENCY MEDICINE

## 2021-01-18 PROCEDURE — 99213 OFFICE O/P EST LOW 20 MIN: CPT | Performed by: FAMILY MEDICINE

## 2021-01-18 RX ORDER — CEPHALEXIN 500 MG/1
500 CAPSULE ORAL 4 TIMES DAILY
Qty: 28 CAPSULE | Refills: 0 | Status: SHIPPED | OUTPATIENT
Start: 2021-01-18 | End: 2021-01-25

## 2021-01-18 RX ORDER — MAGNESIUM CARB/ALUMINUM HYDROX 105-160MG
296 TABLET,CHEWABLE ORAL ONCE
Status: COMPLETED | OUTPATIENT
Start: 2021-01-18 | End: 2021-01-18

## 2021-01-18 RX ORDER — SENNOSIDES 8.6 MG
1 TABLET ORAL DAILY
Qty: 30 TABLET | Refills: 1 | Status: SHIPPED | OUTPATIENT
Start: 2021-01-18 | End: 2021-01-01

## 2021-01-18 RX ORDER — DOCUSATE SODIUM 100 MG/1
100 CAPSULE, LIQUID FILLED ORAL 2 TIMES DAILY
Qty: 30 CAPSULE | Refills: 1 | Status: SHIPPED | OUTPATIENT
Start: 2021-01-18

## 2021-01-18 RX ADMIN — MAGNESIUM CITRATE 296 ML: 1.75 LIQUID ORAL at 20:57

## 2021-01-18 RX ADMIN — DOCUSATE SODIUM 286 ML: 50 LIQUID ORAL at 20:59

## 2021-01-18 ASSESSMENT — ENCOUNTER SYMPTOMS
HEADACHES: 0
DIFFICULTY URINATING: 1
CONSTIPATION: 1
BLOOD IN STOOL: 1
SHORTNESS OF BREATH: 0
FLANK PAIN: 0
BACK PAIN: 0
DYSURIA: 1
FEVER: 0
RHINORRHEA: 0
COUGH: 0
WEAKNESS: 0
LIGHT-HEADEDNESS: 0
ABDOMINAL PAIN: 1
NAUSEA: 1
VOMITING: 0

## 2021-01-18 ASSESSMENT — MIFFLIN-ST. JEOR: SCORE: 1636.68

## 2021-01-19 NOTE — ED TRIAGE NOTES
Pt had a gallbladder out on January 15th. Has been constipated x 1 wk, and today having a hard time urinating.

## 2021-01-19 NOTE — ED NOTES
Patient unable to urinate at this time.  States that she thinks she may have urinated while she was having bowel movement earlier.

## 2021-01-19 NOTE — ED PROVIDER NOTES
History   Chief Complaint:  Constipation     The history is provided by the patient.      Cristine Caballero is a 30 year old female with history of irritable bowel syndrome, hypertension, and symptomatic cholelithiasis, status post cholecystectomy 5 days ago, who presents with constipation for the last week. The patient explains that her stools are very hard, to the extent of needing to digitally evacuate the stool, with traces of blood. She has not tried taking magnesium citrate or using an enema.    She also explains that she is experiencing left upper quadrant abdominal pain with associated nausea. In addition, the patient reports having the urge to urinate but then has troubles going when making an attempt; these urinary troubles started post surgery, 5 days ago. The patient notes some burning with urination and feeling like she is not voiding fully; she also mentions that she has had a UTI in the past, but that the pain is not similar to the pain she is experiencing currently.      Here, she notes pain in her lower incision site that has worsened since the surgery. The patient denies fever, vomiting, shortness of breath, cough and headache.    Review of Systems   Constitutional: Negative for fever.   HENT: Negative for rhinorrhea.    Respiratory: Negative for cough and shortness of breath.    Gastrointestinal: Positive for abdominal pain, blood in stool, constipation and nausea. Negative for vomiting.   Genitourinary: Positive for difficulty urinating, dysuria and urgency. Negative for flank pain and pelvic pain.   Musculoskeletal: Negative for back pain.   Neurological: Negative for weakness, light-headedness and headaches.   All other systems reviewed and are negative.    Allergies:  Lorazepam  Betadex  Codeine  Diphenhydramine  Elderberry Flower  Lavender Oil  Penicillin G  Sulfa Drugs  Fentanyl    Medications:  Luvox  Cogentin  Klonopin  Ferrous  "Sulfate  Risperdal  Trazodone  Percocet  Prilosec  Senna    Past Medical History:    Anxiety  Depression  Irritable Bowel Syndrome  Axillary Hidradenitis Suppurativa  Bipolar Affective Disorder  Suicide Ideation  Pelvic Floor Dysfunction  Borderline Personality Disorder  Obsessive Compulsive Disorder  Anemia  Bruxism  Chronic Constipation  GERD  Hypertension  Eating Disorder  Bernadine-Danlos Syndrome  PTSD  Symptomatic Cholelithiasis    Past Surgical History:    Colon Endoscopy  Tonsillectomy  Exploratory Laparoscopy (x2)  Vulvar Biopsy  Colonoscopy  Combined Esophagoscopy, Gastroscopy, Duodenoscopy (x2)  Dental Surgery  ENT surgery  Laparoscopic Cholecystectomy  Orthopedic Surgery-Left Wrist Surgery    Family History:    Father: Alcoholism   Gout   Hypertension   Mental Illness   Eye Disorder   OCPD  Mother: Acute Myelogenous Leukemia   Deep Vein Thrombosis   Depression   Allergies   Asthma   Eye Disorder   Essential Thrombocytosis   Cancer   Anxiety Disorder   Hoarding   OCD   Thyroid Disease  Sister: Anxiety Disorder   Depression   ADD/ADHD   OCD    Hoarding   Genitourinary Problems   Asthma   Eye Disorder   Allergies    Social History:  Non-Smoker  Does use Alcohol occasionally  Presents alone    Physical Exam     Patient Vitals for the past 24 hrs:   BP Temp Temp src Pulse Resp SpO2 Height Weight   01/18/21 1857 132/89 97.1  F (36.2  C) Temporal 98 18 96 % 1.651 m (5' 5\") 92.1 kg (203 lb)       Physical Exam  General/Appearance: appears stated age, well-groomed, appears comfortable  Eyes: EOMI, no scleral injection, no icterus  ENT: MMM  Neck: supple, nl ROM, no stiffness  Cardiovascular: mild tachy but regular, nl S1S2, no m/r/g, 2+ pulses in all 4 extremities, cap refill <2sec  Respiratory: CTAB, good air movement throughout, no wheezes/rhonchi/rales, no increased WOB, no retractions  Back: no lesions  GI: abd soft, non-distended, some discomfort to palp,  no HSM, no rebound, no guarding, nl BS  MSK: CRESPO, good " tone, no bony abnormality  Skin: warm and well-perfused, no rash, no edema, no ecchymosis, nl turgor  Neuro: GCS 15, alert and oriented, no gross focal neuro deficits  Psych: interacts appropriately  Heme: no petechia, no purpura, no active bleeding    Emergency Department Course     Imaging:  Xray Abdomen, 1 Views:  FINDINGS: Nonobstructive bowel gas pattern. No definite evidence of  free air. Moderate fecal retention throughout the nondistended colon  consistent with constipation. No definite renal stone. Right upper  quadrant surgical clips. As per radiology.    Emergency Department Course:    Reviewed:  I reviewed nursing notes, vitals, past medical history and care everywhere    Assessments:  2022 I obtained history and examined the patient as noted above.   2020 I rechecked the patient and explained findings.     Interventions:  2057 Magnesium Citrate 296 mL PO  2059 Pink Lady Enema 286 mL PO    Disposition:  The patient was discharged to home.     Impression & Plan   Medical Decision Making:  This patient is a 30-year-old female who had a cholecystectomy several days ago who presents today with significant constipation as well as urinary symptoms.  KUB shows significant stool throughout.  She had good results with a pink lady enema however still has significant ways to go.  I will prescribe her Colace and senna to take for at least the next month if not 2 months.  As far as her urinalysis we were unable to get one as she urinated when she had her bowel movement following the enema.  Medically it is unclear if her urinary symptoms are due to compression on her bladder and her urethra from the stool versus true infection versus inflammatory/irritation cystitis from the recent Briones catheter that I am presuming she had during surgery.  I think is reasonable both to start her on antibiotics, do a wait-and-see approach, versus do no antibiotics based on all of those 3.  All of them were offered to the patient and  she ultimately opted to do the wait-and-see approach.  Given a prescription for Keflex.  She feels comfortable with the plan of treating the rest of this at home and will be discharged.  Diagnosis:    ICD-10-CM    1. Constipation, unspecified constipation type  K59.00    2. Acute cystitis without hematuria  N30.00        Discharge Medications:  New Prescriptions    CEPHALEXIN (KEFLEX) 500 MG CAPSULE    Take 1 capsule (500 mg) by mouth 4 times daily for 7 days    DOCUSATE SODIUM (COLACE) 100 MG CAPSULE    Take 1 capsule (100 mg) by mouth 2 times daily    SENNOSIDES (SENOKOT) 8.6 MG TABLET    Take 1 tablet by mouth daily       Scribe Disclosure:  I, Richelle Templeton, am serving as a scribe at 8:27 PM on 1/18/2021 to document services personally performed by Grisel Rojo MD, based on my observations and the provider's statements to me.     I, Swathi Oden, am serving as a scribe on 1/18/2021 at 10:27 PM to personally document services performed by Grisel Rojo MD, based on my observations and the provider's statements to me.        Grisel Rojo MD  01/18/21 2239

## 2021-01-19 NOTE — PROGRESS NOTES
SUBJECTIVE: Cristine Caballero is a 30 year old female presenting with a chief complaint of no BM for 2 weeks and now unable to urinate.  Onset of symptoms was 2 week(s) ago.  Course of illness is worsening.    Severity moderate  Current and Associated symptoms: abd pain  Treatment measures tried include OTC.  Predisposing factors include S/P sx.    Past Medical History:   Diagnosis Date     Anxiety disorder      Bipolar disorder (H)     firs manic episode summer 2020     Depressive disorder      Bernadine-Danlos syndrome      Environmental allergies      Gastroesophageal reflux disease      Hx of eating disorder      Hypertension     elevated since propanolol discontinued     IBS (irritable bowel syndrome)      OCD (obsessive compulsive disorder)     stable with CBT      Small intestinal bacterial overgrowth 2018    pt reported.      Suicide attempt by multiple drug overdose (H) 04/21/2016     Vitamin D deficiency      Allergies   Allergen Reactions     Betadex Nausea and Vomiting     Codeine Other (See Comments)     Other reaction(s): Tremors     Lavender Oil Nausea     Penicillin G      Sulfa Drugs      Fentanyl Rash     Shauna oral rash after fentanyl during egd in 2011     Social History     Tobacco Use     Smoking status: Never Smoker     Smokeless tobacco: Never Used   Substance Use Topics     Alcohol use: Not Currently     Alcohol/week: 3.0 - 6.0 standard drinks     Types: 1 - 2 Glasses of wine, 1 - 2 Cans of beer, 1 - 2 Shots of liquor per week     Frequency: Monthly or less     Drinks per session: 1 or 2     Binge frequency: Monthly     Comment: 1-2 EtOH drinks 2 days per week       ROS:  SKIN: no rash  GI: no vomiting    OBJECTIVE:  /80   Pulse 88   Resp 16   Wt 92.8 kg (204 lb 9.6 oz)   SpO2 98%   BMI 33.02 kg/m  GENERAL APPEARANCE: healthy, alert and no distress  ABDOMEN: tenderness throughout  SKIN: no suspicious lesions or rashes      ICD-10-CM    1. Abdominal pain, generalized  R10.84    2.  Constipation, unspecified constipation type  K59.00    3. Urinary retention  R33.9      Pt will go through ED for cont w/u and tx

## 2021-01-19 NOTE — ED NOTES
"Pt on call light stating that she was able to have a BM. Stated \"I feel like there was some formed stool\". Pt claims she has mild relief of discomfort but feels constipated. RN notified.   "

## 2021-01-24 NOTE — TELEPHONE ENCOUNTER
Abdominal pain, generalized; H/o SIBO and IBS, new/worsening abdominal pain, rectal bleeding; Referral & Records in [facility]; had Colonscopy at [facility] nine years ago or so; no other outside Records -       FUTURE VISIT INFORMATION      FUTURE VISIT INFORMATION:    Date: 08/12/19    Time: 1445 hours    Location: [facility]  REFERRAL INFORMATION:    Referring provider:  Frank Stack MD    Referring providers clinic:  [facility]    Reason for visit/diagnosis:  Abdominal Pain    NOTES STATUS DETAILS   OFFICE NOTE from referring provider  Internal 05/10/19 Dr. Stack   OFFICE NOTE from other specialist   Internal GASTRO 05/02/19 Dr. Mcclure   Most recent dx procedures/path Internal 05/15/19       
negative...

## 2021-01-25 ENCOUNTER — TELEPHONE (OUTPATIENT)
Dept: PSYCHIATRY | Facility: CLINIC | Age: 31
End: 2021-01-25

## 2021-01-25 NOTE — TELEPHONE ENCOUNTER
PSYCHIATRY CLINIC PHONE INTAKE     SERVICES REQUESTED / INTERESTED IN          Individual Psychotherapy     Presenting Problem and Brief History                              What would you like to be seen for? (brief description):  Pt is intersted in seeing someone to help with her OCD and Bi-polar tx. Pt was diagnosed with OCD 10-15 years ago and was diagnosed with Bi-polar this last year. She takes luvox, Klonipin 0.5mg twice a day and 1mg a night, trazadone, and risperidone 5mg. Pt obessess about things she's done int he past, and afraid of maing the same mistake in the future. Pt also has a fear of offending people. No concerns about appeitie. Hard to fall asleep. Pt also becomes obesses with making amends with people, Pt has history of tami.   Have you received a mental health diagnosis? Yes   Which one (s): OCD and Bi-polar  Is there any history of developmental delay?  No   Are you currently seeing a mental health provider?  Yes            Who / month last seen:  [facility]. She also sees Mary Knowles, Psychiatrist at [facility]  Do you have mental health records elsewhere?  Yes  Will you sign a release so we can obtain them?  Yes    Have you ever been hospitalized for psychiatric reasons?  Yes  Describe:  Pt was hospitalized at [facility] twice in the past year.    Do you have current thoughts of self-harm?  Yes  In the past month with plan.   Do you currently have thoughts of harming others?  No       Substance Use History     Do you have any history of alcohol / illicit drug use?  Yes  Describe:  Alcohol abuse. Nor currently a concern.   Have you ever received treatment for this?  No    Describe:  NA     Social History     Who is the patient's a guardian?  No    Name / number: NA  Have you had an ACT team in last 12 months?  No  Describe: NA   Do you have any current or past legal issues?  No  Describe: NA   OK to leave a detailed voicemail?  Yes    Medical/ Surgical History                                    Patient Active Problem List   Diagnosis     Generalized anxiety disorder     Moderate recurrent major depression (H)     Environmental allergies     Irritable bowel syndrome     Small intestinal bacterial overgrowth     GBS (group B streptococcus) infection     Abdominal pain     OCD (obsessive compulsive disorder)     Pelvic floor dysfunction     History of hidradenitis suppurativa     Dyspareunia in female     Symptomatic cholelithiasis          Medications             Current Outpatient Medications   Medication Sig Dispense Refill     cephALEXin (KEFLEX) 500 MG capsule Take 1 capsule (500 mg) by mouth 4 times daily for 7 days 28 capsule 0     clonazePAM (KLONOPIN) 1 MG tablet Take one tablet by mouth daily and may take one additional tablet daily as needed.  Hold after 9 am on the days prior to ECT       docusate sodium (COLACE) 100 MG capsule Take 1 capsule (100 mg) by mouth 2 times daily 30 capsule 1     ferrous sulfate (FEROSUL) 325 (65 Fe) MG tablet Take 325 mg by mouth daily (with breakfast)        fluvoxaMINE (LUVOX) 100 MG tablet Take 1 tab by mouth daily and take 2 tablets by mouth at bedtime       melatonin 3 MG tablet Take 6 mg by mouth At Bedtime        omeprazole (PRILOSEC) 40 MG DR capsule Take 1 capsule (40 mg) by mouth daily 20 capsule 0     ondansetron (ZOFRAN-ODT) 8 MG ODT tab Take 1 tablet (8 mg) by mouth every 8 hours as needed for nausea 30 tablet 0     oxyCODONE-acetaminophen (PERCOCET) 5-325 MG tablet Take 1-2 tablets by mouth every 4 hours as needed for moderate to severe pain 12 tablet 0     risperiDONE (RISPERDAL M-TABS) 2 MG ODT Place 2 mg under the tongue every morning        risperiDONE (RISPERDAL M-TABS) 3 MG ODT Place 3 mg under the tongue At Bedtime        sennosides (SENOKOT) 8.6 MG tablet Take 1 tablet by mouth daily 30 tablet 1     traZODone (DESYREL) 50 MG tablet Take 50 mg by mouth At Bedtime May take an extra 50 mg tablet as needed for sleep            DISPOSITION      1/25/21 Intake complete. Prefers female with provider with interest in social justice. Sending to  for review.     Carly Kendrick,

## 2021-01-26 ENCOUNTER — TELEPHONE (OUTPATIENT)
Dept: SURGERY | Facility: CLINIC | Age: 31
End: 2021-01-26

## 2021-01-26 NOTE — TELEPHONE ENCOUNTER
[facility]  Post op call note - LAPAROSCOPIC CHOLECYSTECTOMY  January 26, 2021       Cristine Caballero was called for an update regarding her recovery.  She underwent a laparoscopic cholecystectomy by Dr. Christy on 1/13/21.     Today she tells me she is doing well and denies any complaints.  She currently does not need any pain medications.  She is eating a normal diet and her bowels are regular, although she did have severe constipation initially after surgery requiring a visit in the ED and enema to help with this. Ali states she is slowly resuming normal activity.  She states her wounds are healing well and the steri strips are still in place. She still has some resolving bruising around her lower incision.    She was instructed to remove steri strips and continue to keep wounds clean.  She was advised to slowly advance her activity as tolerated over the next couple weeks.  The patient states all of her questions were answered and she understands our discussion.  She agrees to follow up as needed and to call our office with any concerns.      Sofía Carballo PA-C  [facility]  [phone]      Please route or send letter to:  Primary Care Provider (PCP)

## 2021-01-29 ENCOUNTER — TELEPHONE (OUTPATIENT)
Dept: SURGERY | Facility: CLINIC | Age: 31
End: 2021-01-29

## 2021-01-29 NOTE — TELEPHONE ENCOUNTER
Procedure:  Laparoscopic cholecystectomy    Date:  01/13/2021    Surgeon:  Jaelyn    Patient reporting RUQ pain, at gallbladder site. She reports she was feeling well, until yesterday. She did some yoga in the morning and did eat some ice cream later in the day.    Pain is subsiding after taking tylenol.    Informed her that it is likely a combination of increasing physical activity as well as potentially too much fat consumption with the ice cream this soon after surgery.    Recommend she continue to monitor and pay attention to what she is doing, or has recently done/eaten if symptoms return.    She agreed.  She will continue to monitor and she will call with any persisting, worsening, or new symptoms.    Sharona Lopez RN-BSN

## 2021-01-29 NOTE — TELEPHONE ENCOUNTER
Name of caller: Patient    Reason for Call:  Symptoms  Experiencing pain on the right side    Surgeon:  Dr. Christy    Recent Surgery:  Yes.    If yes, when & what type:  1/13/2021    LAPAROSCOPIC CHOLECYSTECTOMY      Best phone number to reach pt at is: [phone]    Ok to leave a message with medical info? Yes.

## 2021-02-01 NOTE — TELEPHONE ENCOUNTER
Patient called to check on status of therapy scheduling. Since patient has Medicare, she is unable to be seen by a learner. Patient was given the phone number for the Behavioral Health Service Line to look into other [facility] clinics that accept her insurance.

## 2021-02-08 ENCOUNTER — TELEPHONE (OUTPATIENT)
Dept: SURGERY | Facility: CLINIC | Age: 31
End: 2021-02-08

## 2021-02-08 NOTE — TELEPHONE ENCOUNTER
1/13/21 bernardo RUFF, nurse from facility where she lives has questions about PO healing    Phone: [phone]    Message ok

## 2021-02-08 NOTE — TELEPHONE ENCOUNTER
Left message on nursing station voicemail providing direct dial call back number to discuss.    Sharona Lopez, RN-BSN

## 2021-02-09 ENCOUNTER — TELEPHONE (OUTPATIENT)
Dept: SURGERY | Facility: CLINIC | Age: 31
End: 2021-02-09

## 2021-02-09 NOTE — TELEPHONE ENCOUNTER
Procedure: Laparoscopic cholecystectomy    Date:  01/13/2021    Surgeon:  Jaelyn    [facility] where patient resides calling to discuss, reporting that patient is c/o pain and requesting narcotic pain medication on a daily basis. They are unsure if she is having pain or if she is drug seeking.    Informed staff that if patient is still experiencing post op/surgical pain, she should be seen in clinic. There should be no need at this point for narcotic pain management.    Informed her that they could try antacids as could be gastric acid reflux induced. Also recommend low fat diet to see if symptoms resolve.    If not, could have hepatic panel and lipase labs drawn.    If patient still c/o pain, she should come see Dr. Christy in clinic for a post operative evaluation.    Facility requesting an order to discontinue percocet.    Order faxed.    Sharona Lopez RN-BSN

## 2021-02-09 NOTE — PROGRESS NOTES
Please discontinue Percocet. Faxed to [facility].    Christina Peguero PA-C

## 2021-04-04 ENCOUNTER — HEALTH MAINTENANCE LETTER (OUTPATIENT)
Age: 31
End: 2021-04-04

## 2021-05-29 ENCOUNTER — RECORDS - HEALTHEAST (OUTPATIENT)
Dept: ADMINISTRATIVE | Facility: CLINIC | Age: 31
End: 2021-05-29
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
## 2022-01-01 ENCOUNTER — HEALTH MAINTENANCE LETTER (OUTPATIENT)
Age: 32
End: 2022-01-01

## 2023-04-27 NOTE — ED PROVIDER NOTES
[place] EMERGENCY DEPARTMENT ([facility])  October 26, 2019    History     Chief Complaint   Patient presents with     Nausea & Vomiting     HPI  Cristine Caballero is a 29 year old female with a history of HTN, IBS, GERD, anxiety, OCD, and depression who presents to the ED for multiple complaints. Patient states she woke up at 5:00 AM this morning, because she felt like she was having a panic attack, which she states has been happening for the past few months. She complains of blurry vision, tremors, palpitations, dizziness, chills, subjective fevers, diaphoresis, nausea, vomiting, difficulty swallowing, and a headache. She states she has not vomited today, but she is nauseous right now. She also complains of a stabbing pain in her lower back. Patient reports she is itchy all over, and has been losing fist fulls of hair in the shower. Of note, patient states she had similar symptoms back in April before she stopped taking Effexor and Propanolol. She is not currently taking any medications or supplements. Patient is also concerned, because she has lost 70 pounds over an unspecified amount of time and she has been confused. She reports she has difficulty spelling and remembering words. 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     PAST MEDICAL HISTORY  Past Medical History:   Diagnosis Date     Anxiety disorder      Depressive disorder      Environmental allergies      Gastroesophageal reflux disease      Hx of eating disorder      Hypertension     elevated since propanolol discontinued     IBS (irritable bowel syndrome)      OCD (obsessive compulsive disorder)     stable with CBT      Small intestinal bacterial overgrowth 2018    pt reported.      Suicide attempt by multiple drug overdose 4.21.2016     Vitamin D deficiency      PAST SURGICAL HISTORY  Past Surgical History:   Procedure Laterality Date     ABDOMEN SURGERY  01/2012    Exploratory  Laparoscopy     biopsy  8-    vulvar     BIOPSY      GI, vulva- OK     COLONOSCOPY  2011     COLONOSCOPY N/A 5/15/2019    Procedure: Colonoscopy, With Polypectomy And Biopsy;  Surgeon: Perico Meek MD;  Location: [facility] OR     COLONOSCOPY WITH CO2 INSUFFLATION N/A 5/15/2019    Procedure: COLONOSCOPY, WITH CO2 INSUFFLATION;  Surgeon: Perico Meek MD;  Location: [facility]     COMBINED ESOPHAGOSCOPY, GASTROSCOPY, DUODENOSCOPY (EGD) WITH CO2 INSUFFLATION N/A 5/15/2019    Procedure: ESOPHAGOGASTRODUODENOSCOPY, WITH CO2 INSUFFLATION;  Surgeon: Perico Meek MD;  Location: [facility] OR     DENTAL SURGERY  2011     ENT SURGERY  2014    intubation & NG tube     ESOPHAGOSCOPY, GASTROSCOPY, DUODENOSCOPY (EGD), COMBINED  10/11/2011    Procedure:COMBINED ESOPHAGOSCOPY, GASTROSCOPY, DUODENOSCOPY (EGD), BIOPSY SINGLE OR MULTIPLE; Surgeon:TALA MONTEMAYOR; Location:[facility]     ESOPHAGOSCOPY, GASTROSCOPY, DUODENOSCOPY (EGD), COMBINED N/A 5/15/2019    Procedure: Esophagogastroduodenoscopy, With Biopsy;  Surgeon: Perico Meek MD;  Location: [facility] OR     HC BREATH HYDROGEN TEST  10/4/2011    Procedure:HYDROGEN BREATH TEST; Surgeon:RADHA EPSTEIN; Location:[facility]     LAPAROSCOPY       ORTHOPEDIC SURGERY      left wrist- screw in left scaphiod     TONSILLECTOMY       WRIST SURGERY  2004    screw placed L wrist after fracture     FAMILY HISTORY  Family History   Problem Relation Age of Onset     Depression Mother      Allergies Mother      Asthma Mother      Thyroid Disease Mother      Eye Disorder Mother      Blood Disease Mother          essential thrombocytosis     Cancer Mother         AML, recent BMT     Anxiety Disorder Mother      Mental Illness Mother         Hoarding- OCD     Depression Sister      Anxiety Disorder Sister      Mental Illness Sister         OCD_ HOarding     Genitourinary Problems Sister      Asthma Sister      Eye Disorder Sister      Allergies Sister      Allergies Sister      Mental Illness Sister         ADHD      Depression Maternal Grandmother      Heart Disease Maternal Grandmother      Thyroid Disease Maternal Grandmother      Depression Other         aunt     Anxiety Disorder Other      Genitourinary Problems Other         aunt     Suicide Other      Heart Disease Maternal Grandfather      C.A.D. Maternal Grandfather      C.A.D. Paternal Grandfather      Cancer Paternal Grandfather      Eye Disorder Father      Mental Illness Father         OCPD?     Allergies Other         uncles     SOCIAL HISTORY  Social History     Tobacco Use     Smoking status: Never Smoker     Smokeless tobacco: Never Used   Substance Use Topics     Alcohol use: Yes     Alcohol/week: 3.0 - 6.0 standard drinks     Types: 1 - 2 Glasses of wine, 1 - 2 Cans of beer, 1 - 2 Shots of liquor per week     Frequency: Monthly or less     Drinks per session: 1 or 2     Binge frequency: Monthly     Comment: 1-2 EtOH drinks 2 days per week     MEDICATIONS  No current facility-administered medications for this encounter.      Current Outpatient Medications   Medication     metoclopramide (REGLAN) 5 MG tablet     ALLERGIES  Allergies   Allergen Reactions     Betadex Nausea and Vomiting     Codeine Other (See Comments)     Other reaction(s): Tremors     Penicillin G      Sulfa Drugs      Fentanyl Rash     Shauna oral rash after fentanyl during egd in 2011       I have reviewed the Medications, Allergies, Past Medical and Surgical History, and Social History in the Epic system.    Review of Systems   Constitutional: Positive for chills, diaphoresis, fever (subjective) and unexpected weight change (lost 70 lbs).   HENT: Positive for trouble swallowing. Negative for congestion.    Eyes: Positive for visual disturbance (blurry vision). Negative for redness.   Respiratory: Negative for shortness of breath.    Cardiovascular: Positive for palpitations. Negative for chest pain.   Gastrointestinal: Positive for nausea and vomiting. Negative for abdominal pain.  "  Genitourinary: Negative for difficulty urinating.   Musculoskeletal: Positive for back pain (lower back). Negative for arthralgias and neck stiffness.   Skin: Negative for color change.        Positive for itching.   Neurological: Positive for dizziness, tremors and headaches.   Psychiatric/Behavioral: Positive for confusion.       Physical Exam   BP: 117/82  Pulse: 93  Heart Rate: 80  Temp: 98.8  F (37.1  C)  Resp: 18  Height: 167.6 cm (5' 6\")  SpO2: 100 %      Physical Exam  Constitutional:       General: She is not in acute distress.     Appearance: She is not diaphoretic.   HENT:      Head: Atraumatic.      Mouth/Throat:      Pharynx: No oropharyngeal exudate.   Eyes:      General: No scleral icterus.     Pupils: Pupils are equal, round, and reactive to light.   Neck:      Musculoskeletal: Neck supple.   Cardiovascular:      Rate and Rhythm: Normal rate and regular rhythm.      Heart sounds: Normal heart sounds. No murmur. No friction rub. No gallop.    Pulmonary:      Effort: Pulmonary effort is normal. No respiratory distress.      Breath sounds: Normal breath sounds. No stridor. No wheezing, rhonchi or rales.   Chest:      Chest wall: No tenderness.   Abdominal:      General: Abdomen is flat. Bowel sounds are normal. There is no distension.      Palpations: Abdomen is soft. There is no mass.      Tenderness: There is no tenderness. There is no right CVA tenderness, left CVA tenderness, guarding or rebound.      Hernia: No hernia is present.   Musculoskeletal:         General: No tenderness.   Skin:     General: Skin is warm.      Findings: No rash.   Neurological:      General: No focal deficit present.      Mental Status: She is oriented to person, place, and time.   Psychiatric:         Attention and Perception: Attention normal.         Mood and Affect: Affect normal. Mood is anxious.         Speech: Speech normal.         Behavior: Behavior normal.         Thought Content: Thought content normal. "         ED Course        Procedures             EKG Interpretation:      Interpreted by Dannielle Ojeda MD  Time reviewed: 9:57  Symptoms at time of EKG: palpitations  Rhythm: normal sinus   Rate: normal  Axis: normal  Ectopy: none  Conduction: normal  ST Segments/ T Waves: No ST-T wave changes  Q Waves: none  Comparison to prior: Unchanged from 4/20/19    Clinical Impression: normal EKG          Results for orders placed or performed during the hospital encounter of 10/26/19 (from the past 24 hour(s))   Basic metabolic panel     Status: Abnormal    Collection Time: 10/26/19  9:38 AM   Result Value Ref Range    Sodium 139 133 - 144 mmol/L    Potassium 3.7 3.4 - 5.3 mmol/L    Chloride 109 94 - 109 mmol/L    Carbon Dioxide 25 20 - 32 mmol/L    Anion Gap 5 3 - 14 mmol/L    Glucose 112 (H) 70 - 99 mg/dL    Urea Nitrogen 12 7 - 30 mg/dL    Creatinine 0.59 0.52 - 1.04 mg/dL    GFR Estimate >90 >60 mL/min/[1.73_m2]    GFR Estimate If Black >90 >60 mL/min/[1.73_m2]    Calcium 9.1 8.5 - 10.1 mg/dL   CBC with platelets differential     Status: None    Collection Time: 10/26/19  9:38 AM   Result Value Ref Range    WBC 5.5 4.0 - 11.0 10e9/L    RBC Count 4.51 3.8 - 5.2 10e12/L    Hemoglobin 13.6 11.7 - 15.7 g/dL    Hematocrit 41.1 35.0 - 47.0 %    MCV 91 78 - 100 fl    MCH 30.2 26.5 - 33.0 pg    MCHC 33.1 31.5 - 36.5 g/dL    RDW 13.2 10.0 - 15.0 %    Platelet Count 326 150 - 450 10e9/L    Diff Method Automated Method     % Neutrophils 65.1 %    % Lymphocytes 23.0 %    % Monocytes 9.9 %    % Eosinophils 1.3 %    % Basophils 0.5 %    % Immature Granulocytes 0.2 %    Nucleated RBCs 0 0 /100    Absolute Neutrophil 3.6 1.6 - 8.3 10e9/L    Absolute Lymphocytes 1.3 0.8 - 5.3 10e9/L    Absolute Monocytes 0.6 0.0 - 1.3 10e9/L    Absolute Eosinophils 0.1 0.0 - 0.7 10e9/L    Absolute Basophils 0.0 0.0 - 0.2 10e9/L    Abs Immature Granulocytes 0.0 0 - 0.4 10e9/L    Absolute Nucleated RBC 0.0    Erythrocyte sedimentation rate auto     Status:  None    Collection Time: 10/26/19  9:38 AM   Result Value Ref Range    Sed Rate 5 0 - 20 mm/h   Hepatic panel     Status: None    Collection Time: 10/26/19  9:56 AM   Result Value Ref Range    Bilirubin Direct <0.1 0.0 - 0.2 mg/dL    Bilirubin Total 0.4 0.2 - 1.3 mg/dL    Albumin 4.0 3.4 - 5.0 g/dL    Protein Total 7.1 6.8 - 8.8 g/dL    Alkaline Phosphatase 60 40 - 150 U/L    ALT 32 0 - 50 U/L    AST 22 0 - 45 U/L   Lipase     Status: None    Collection Time: 10/26/19  9:56 AM   Result Value Ref Range    Lipase 104 73 - 393 U/L   CRP inflammation     Status: None    Collection Time: 10/26/19  9:56 AM   Result Value Ref Range    CRP Inflammation <2.9 0.0 - 8.0 mg/L   EKG 12-lead, tracing only     Status: None (Preliminary result)    Collection Time: 10/26/19  9:57 AM   Result Value Ref Range    Interpretation ECG Click View Image link to view waveform and result    XR Chest Port 1 View     Status: None    Collection Time: 10/26/19 10:15 AM    Narrative    Exam: XR CHEST PORT 1 VW, 10/26/2019 10:15 AM    Indication: N/V    Comparison: 14 2019    Findings:   Single portable view of the chest. The cardiomediastinal silhouette  and upper abdomen are unremarkable.    There is no pleural effusion. No pneumothorax. No focal airspace  opacities.      Impression    IMPRESSION: No acute cardiopulmonary findings.    I have personally reviewed the examination and initial interpretation  and I agree with the findings.    LILLIAM BETHEA MD   UA reflex to Microscopic and Culture     Status: None    Collection Time: 10/26/19 11:22 AM   Result Value Ref Range    Color Urine Light Yellow     Appearance Urine Clear     Glucose Urine Negative NEG^Negative mg/dL    Bilirubin Urine Negative NEG^Negative    Ketones Urine Negative NEG^Negative mg/dL    Specific Gravity Urine 1.007 1.003 - 1.035    Blood Urine Negative NEG^Negative    pH Urine 7.0 5.0 - 7.0 pH    Protein Albumin Urine Negative NEG^Negative mg/dL    Urobilinogen mg/dL Normal  0.0 - 2.0 mg/dL    Nitrite Urine Negative NEG^Negative    Leukocyte Esterase Urine Negative NEG^Negative    Source Midstream Urine    hCG qual urine POCT     Status: Normal    Collection Time: 10/26/19 11:28 AM   Result Value Ref Range    HCG Qual Urine Negative neg    Internal QC OK Yes            Labs Ordered and Resulted from Time of ED Arrival Up to the Time of Departure from the ED   BASIC METABOLIC PANEL - Abnormal; Notable for the following components:       Result Value    Glucose 112 (*)     All other components within normal limits   HCG QUAL URINE POCT - Normal   CBC WITH PLATELETS DIFFERENTIAL   HEPATIC PANEL   LIPASE   CRP INFLAMMATION   UA MACROSCOPIC WITH REFLEX TO MICRO AND CULTURE   ERYTHROCYTE SEDIMENTATION RATE AUTO   DRUG ABUSE SCREEN 6 CHEM DEP URINE ([facility])   ORTHOSTATIC BLOOD PRESSURE AND PULSE   CARDIAC CONTINUOUS MONITORING            Assessments & Plan (with Medical Decision Making)  N/V and dizziness in the pt with depression anxiety borderline personality disorder, and also plethora of symptoms, EKG and monitor without any significant pathologies, labs and ua upt ok, NS and reglan with resolution, tolerating peanut M and M very well, will discharge with reglan prn, slip for work, and Endocrine follow up requested per this pt, but also encouraged to follow up with her PMD at [facility] and Psychiatry there.       I have reviewed the nursing notes.    I have reviewed the findings, diagnosis, plan and need for follow up with the patient.    New Prescriptions    METOCLOPRAMIDE (REGLAN) 5 MG TABLET    Take 1 tablet (5 mg) by mouth 4 times daily as needed (nausea vomiting)       Final diagnoses:   Nausea and vomiting, intractability of vomiting not specified, unspecified vomiting type   Dizziness     I, Carly Hanna, am serving as a trained medical scribe to document services personally performed by Dannielle Ojeda MD, based on the provider's statements to me.      I, Dannielle Ojeda MD, was  physically present and have reviewed and verified the accuracy of this note documented by Carly Hanna.     10/26/2019   [facility], [place], EMERGENCY DEPARTMENT     Dannielle Ojeda MD  10/26/19 [id number]     no

## 2023-06-09 NOTE — TELEPHONE ENCOUNTER
Type of surgery: Lap ede  Location of surgery: [facility]  Date and time of surgery: 1/13/21 at 12pm  Surgeon: Dr. Frank Christy  Pre-Op Appt Date: Patient to schedule  Post-Op Appt Date: Patient to schedule   Packet sent out: Yes  Pre-cert/Authorization completed:  Not Applicable  Date: 1/4/21     
domo

## 2024-06-11 NOTE — ED TRIAGE NOTES
Pt arrives with abdominal pain and back pain which has gotten worse over the past 10 days-2 weeks. Pt reports NV and fevers and chills. She tends to break out in sweats after she eats. Denies blood in the urine but has blood in the stool. She had a URI last week and was vomiting with that also. She denies blood in the urine.    11-Jun-2024

## 2024-10-29 ASSESSMENT — ANXIETY QUESTIONNAIRES: GAD7 TOTAL SCORE: 14

## 2025-07-18 NOTE — PROGRESS NOTES
Lab ordered-cmp   SUBJECTIVE:   Cristine Caballero is a 30 year old female presenting with a chief complaint of   Chief Complaint   Patient presents with     Urgent Care     Since 3 am this morning patient has had stabbing abdominal pain. She spit up blood 1X. Feels like she needs to vomit       She is an established patient of [facility].    Gastro    Onset of symptoms was 6 hour(s) ago.  Course of illness is worsening.    Severity moderately severe  Current and Associated symptoms:  abdominal pain epigastric and nausea   Aggravating factors: movement and eating.    Alleviating factors:antacids  Diarrhea: No  Stools: few hard stools  Vomitting: No. However she feels nauseated  Appetite: decreased  Risk factors: Patient states that she ate a lot yesterday and May be a little constipated. She denies any regular use of NSAIDs or excessive use of caffeine or alcohol.     Review of Systems   Constitutional: Negative.    HENT: Negative.    Eyes: Negative.    Respiratory: Negative.    Cardiovascular: Negative.    Gastrointestinal: Positive for abdominal distention, abdominal pain, constipation and nausea. Negative for anal bleeding, blood in stool, diarrhea, rectal pain and vomiting.   Endocrine: Negative.    Genitourinary: Positive for flank pain. Negative for decreased urine volume, difficulty urinating, dyspareunia, dysuria, enuresis, frequency, genital sores, hematuria, menstrual problem, pelvic pain, urgency, vaginal bleeding, vaginal discharge and vaginal pain.   Skin: Negative.    Neurological: Negative.    Psychiatric/Behavioral: Negative.        Past Medical History:   Diagnosis Date     Anxiety disorder      Bipolar disorder (H)     firs manic episode summer 2020     Depressive disorder      Bernadine-Danlos syndrome      Environmental allergies      Gastroesophageal reflux disease      Hx of eating disorder      Hypertension     elevated since propanolol discontinued     IBS (irritable bowel syndrome)      OCD (obsessive compulsive  disorder)     stable with CBT      Small intestinal bacterial overgrowth 2018    pt reported.      Suicide attempt by multiple drug overdose (H) 04/21/2016     Vitamin D deficiency      Family History   Problem Relation Age of Onset     Depression Mother      Allergies Mother      Asthma Mother      Thyroid Disease Mother      Eye Disorder Mother      Blood Disease Mother          essential thrombocytosis     Cancer Mother         AML, recent BMT     Anxiety Disorder Mother      Mental Illness Mother         Hoarding- OCD     Depression Sister      Anxiety Disorder Sister      Mental Illness Sister         OCD_ HOarding     Genitourinary Problems Sister      Asthma Sister      Eye Disorder Sister      Allergies Sister      Allergies Sister      Mental Illness Sister         ADHD     Depression Maternal Grandmother      Heart Disease Maternal Grandmother      Thyroid Disease Maternal Grandmother      Depression Other         aunt     Anxiety Disorder Other      Genitourinary Problems Other         aunt     Suicide Other      Heart Disease Maternal Grandfather      C.A.D. Maternal Grandfather      C.A.D. Paternal Grandfather      Cancer Paternal Grandfather      Eye Disorder Father      Mental Illness Father         OCPD?     Allergies Other         uncles     Current Outpatient Medications   Medication Sig Dispense Refill     Bioflavonoid Products (STEPHANIE-C) TABS Take 1 tablet by mouth       clonazePAM (KLONOPIN) 1 MG tablet Take one tablet by mouth daily and may take one additional tablet daily as needed.  Hold after 9 am on the days prior to ECT       ferrous sulfate (FEROSUL) 325 (65 Fe) MG tablet Take 325 mg by mouth       fluvoxaMINE (LUVOX) 100 MG tablet Take 200 mg by mouth       melatonin 3 MG tablet Take 6 mg by mouth       risperiDONE (RISPERDAL M-TABS) 2 MG ODT Place 2 mg under the tongue       risperiDONE (RISPERDAL M-TABS) 3 MG ODT Place 3 mg under the tongue       traZODone (DESYREL) 50 MG tablet Take 50  mg by mouth       metoclopramide (REGLAN) 5 MG tablet Take 1 tablet (5 mg) by mouth 4 times daily as needed (nausea vomiting) (Patient not taking: Reported on 12/25/2020) 10 tablet 0     polyvinyl alcohol (LIQUIFILM TEARS) 1.4 % ophthalmic solution Place 1 drop into the right eye as needed for dry eyes or other (irritation) (Patient not taking: Reported on 12/25/2020) 15 mL 0     Social History     Tobacco Use     Smoking status: Never Smoker     Smokeless tobacco: Never Used   Substance Use Topics     Alcohol use: Yes     Alcohol/week: 3.0 - 6.0 standard drinks     Types: 1 - 2 Glasses of wine, 1 - 2 Cans of beer, 1 - 2 Shots of liquor per week     Frequency: Monthly or less     Drinks per session: 1 or 2     Binge frequency: Monthly     Comment: 1-2 EtOH drinks 2 days per week       OBJECTIVE  /68   Pulse 76   Temp 97.6  F (36.4  C) (Tympanic)   Wt 91.8 kg (202 lb 6.4 oz)   SpO2 97%   BMI 32.67 kg/m      Physical Exam  Constitutional:       General: She is not in acute distress.     Appearance: Normal appearance. She is normal weight. She is not ill-appearing, toxic-appearing or diaphoretic.   HENT:      Head: Normocephalic and atraumatic.   Cardiovascular:      Rate and Rhythm: Normal rate and regular rhythm.      Pulses: Normal pulses.      Heart sounds: Normal heart sounds. No murmur. No friction rub. No gallop.    Pulmonary:      Effort: Pulmonary effort is normal. No respiratory distress.      Breath sounds: Normal breath sounds. No stridor. No wheezing, rhonchi or rales.   Chest:      Chest wall: No tenderness.   Abdominal:      General: Abdomen is flat. Bowel sounds are normal. There is no distension or abdominal bruit.      Palpations: There is no shifting dullness, fluid wave, hepatomegaly, splenomegaly, mass or pulsatile mass.      Tenderness: There is abdominal tenderness in the right upper quadrant, epigastric area and left upper quadrant. There is no right CVA tenderness, left CVA  tenderness, guarding or rebound. Negative signs include Batres's sign, Rovsing's sign, McBurney's sign, psoas sign and obturator sign.      Hernia: No hernia is present.       Neurological:      General: No focal deficit present.      Mental Status: She is alert and oriented to person, place, and time. Mental status is at baseline.      Gait: Gait normal.   Psychiatric:         Mood and Affect: Mood normal.         Behavior: Behavior normal.         Thought Content: Thought content normal.         Judgment: Judgment normal.       Results for orders placed or performed in visit on 12/25/20   Amylase     Status: None   Result Value Ref Range    Amylase 36 30 - 110 U/L   CBC with platelets and differential     Status: None   Result Value Ref Range    WBC 5.9 4.0 - 11.0 10e9/L    RBC Count 4.67 3.8 - 5.2 10e12/L    Hemoglobin 14.3 11.7 - 15.7 g/dL    Hematocrit 42.5 35.0 - 47.0 %    MCV 91 78 - 100 fl    MCH 30.6 26.5 - 33.0 pg    MCHC 33.6 31.5 - 36.5 g/dL    RDW 12.7 10.0 - 15.0 %    Platelet Count 237 150 - 450 10e9/L    % Neutrophils 66.3 %    % Lymphocytes 22.2 %    % Monocytes 10.1 %    % Eosinophils 1.2 %    % Basophils 0.2 %    Absolute Neutrophil 3.9 1.6 - 8.3 10e9/L    Absolute Lymphocytes 1.3 0.8 - 5.3 10e9/L    Absolute Monocytes 0.6 0.0 - 1.3 10e9/L    Absolute Eosinophils 0.1 0.0 - 0.7 10e9/L    Absolute Basophils 0.0 0.0 - 0.2 10e9/L    Diff Method Automated Method    Comprehensive metabolic panel (BMP + Alb, Alk Phos, ALT, AST, Total. Bili, TP)     Status: Abnormal   Result Value Ref Range    Sodium 138 133 - 144 mmol/L    Potassium 4.0 3.4 - 5.3 mmol/L    Chloride 106 94 - 109 mmol/L    Carbon Dioxide 27 20 - 32 mmol/L    Anion Gap 5 3 - 14 mmol/L    Glucose 143 (H) 70 - 99 mg/dL    Urea Nitrogen 22 7 - 30 mg/dL    Creatinine 0.73 0.52 - 1.04 mg/dL    GFR Estimate >90 >60 mL/min/[1.73_m2]    GFR Estimate If Black >90 >60 mL/min/[1.73_m2]    Calcium 8.8 8.5 - 10.1 mg/dL    Bilirubin Total 0.2 0.2 - 1.3  mg/dL    Albumin 4.0 3.4 - 5.0 g/dL    Protein Total 7.3 6.8 - 8.8 g/dL    Alkaline Phosphatase 58 40 - 150 U/L    ALT 20 0 - 50 U/L    AST 11 0 - 45 U/L     An X-ray was ordered today and reviewed by me. There does not appear to be any evidence of obstruction although there does seem to be a moderate amount of stool build up in the distal colon. Awaiting radiology report.     ASSESSMENT/PLAN:  (K29.00) Acute gastritis without hemorrhage, unspecified gastritis type  (primary encounter diagnosis)  Plan: lidocaine (XYLOCAINE) 2 % 15 mL, alum & mag         hydroxide-simethicone (MAALOX) 15 mL GI         Cocktail, XR Abdomen 2 Views, Lipase, Amylase,         UA with Microscopic reflex to Culture, CBC with        platelets and differential, Comprehensive         metabolic panel (BMP + Alb, Alk Phos, ALT, AST,        Total. Bili, TP), omeprazole (PRILOSEC) 40 MG         DR capsule, ondansetron (ZOFRAN-ODT) 8 MG ODT         tab, ondansetron (ZOFRAN-ODT) ODT tab 8 mg      (K59.00) Constipation, unspecified constipation type  Plan: magnesium hydroxide (MILK OF MAGNESIA) 400         MG/5ML suspension    Patient will need to drink at least 1.5-2 liters of fluids daily for adults and 1-1.5 liters for children. If vomiting and not tolerating liquids for more than 24 hrs, please go to your nearest emergency department for IV fluids and further treatment.     Maintain hydration by drinking small amounts of clear fluids frequently, then soft diet, and then advance diet as tolerated. May use any prescribed imodium if desired for any diarrhea. Call if symptoms worsen, high fever, severe weakness or fainting, increased abdominal pain, blood in stool or vomit, or failure to improve in 2-3 days.     Patient was advised to return to clinic if symptoms do not improve in the amount of time specified in the AVS or if symptoms worsen. Patient educated on red flag symptoms and asked to go directly to the ED if symptoms present themselves.      Tang Hollis PA-C on 12/25/2020 at 10:13 AM

## (undated) DEVICE — DECANTER VIAL 2006S

## (undated) DEVICE — SU VICRYL 0 UR-6 27" J603H

## (undated) DEVICE — SOL NACL 0.9% INJ 1000ML BAG 2B1324X

## (undated) DEVICE — ENDO POUCH UNIV RETRIEVAL SYSTEM INZII 10MM CD001

## (undated) DEVICE — SOL WATER IRRIG 1000ML BOTTLE 2F7114

## (undated) DEVICE — ENDO TROCAR FIRST ENTRY KII FIOS Z-THRD 05X100MM CTF03

## (undated) DEVICE — LINEN TOWEL PACK X5 5464

## (undated) DEVICE — ENDO TROCAR SLEEVE KII Z-THREADED 05X100MM CTS02

## (undated) DEVICE — GLOVE GAMMEX DERMAPRENE ULTRA SZ 8 LF 8516

## (undated) DEVICE — ESU HOLDER LAP INST DISP PURPLE LONG 330MM H-PRO-330

## (undated) DEVICE — ENDO SCOPE WARMER LF TM500

## (undated) DEVICE — PREP CHLORAPREP 26ML TINTED ORANGE  260815

## (undated) DEVICE — CLIP APPLIER ENDO 5MM M/L LIGAMAX EL5ML

## (undated) DEVICE — SUCTION IRR STRYKERFLOW II W/TIP 250-070-520

## (undated) DEVICE — GLOVE PROTEXIS W/NEU-THERA 7.5  2D73TE75

## (undated) DEVICE — SUCTION CANISTER MEDIVAC LINER 3000ML W/LID 65651-530

## (undated) DEVICE — PACK LAP CHOLE SLC15LCFSD

## (undated) DEVICE — ENDO TROCAR FIRST ENTRY KII FIOS Z-THRD 12X100MM CTF73

## (undated) DEVICE — SOL WATER IRRIG 1000ML BOTTLE 07139-09

## (undated) DEVICE — ESU GROUND PAD UNIVERSAL W/O CORD

## (undated) DEVICE — SU MONOCRYL 4-0 PS-2 18" UND Y496G

## (undated) RX ORDER — PROPOFOL 10 MG/ML
INJECTION, EMULSION INTRAVENOUS
Status: DISPENSED
Start: 2019-05-15

## (undated) RX ORDER — HYDROMORPHONE HYDROCHLORIDE 1 MG/ML
INJECTION, SOLUTION INTRAMUSCULAR; INTRAVENOUS; SUBCUTANEOUS
Status: DISPENSED
Start: 2021-01-13

## (undated) RX ORDER — CLINDAMYCIN PHOSPHATE 900 MG/50ML
INJECTION, SOLUTION INTRAVENOUS
Status: DISPENSED
Start: 2021-01-13

## (undated) RX ORDER — EPINEPHRINE 1 MG/ML
INJECTION, SOLUTION INTRAMUSCULAR; SUBCUTANEOUS
Status: DISPENSED
Start: 2021-01-13

## (undated) RX ORDER — PROPOFOL 10 MG/ML
INJECTION, EMULSION INTRAVENOUS
Status: DISPENSED
Start: 2021-01-13

## (undated) RX ORDER — FENTANYL CITRATE 50 UG/ML
INJECTION, SOLUTION INTRAMUSCULAR; INTRAVENOUS
Status: DISPENSED
Start: 2021-01-13

## (undated) RX ORDER — BUPIVACAINE HYDROCHLORIDE 2.5 MG/ML
INJECTION, SOLUTION EPIDURAL; INFILTRATION; INTRACAUDAL
Status: DISPENSED
Start: 2021-01-13